# Patient Record
Sex: FEMALE | Race: WHITE | NOT HISPANIC OR LATINO | Employment: FULL TIME | URBAN - METROPOLITAN AREA
[De-identification: names, ages, dates, MRNs, and addresses within clinical notes are randomized per-mention and may not be internally consistent; named-entity substitution may affect disease eponyms.]

---

## 2017-01-05 ENCOUNTER — ALLSCRIPTS OFFICE VISIT (OUTPATIENT)
Dept: OTHER | Facility: OTHER | Age: 52
End: 2017-01-05

## 2017-05-02 DIAGNOSIS — Z12.31 ENCOUNTER FOR SCREENING MAMMOGRAM FOR MALIGNANT NEOPLASM OF BREAST: ICD-10-CM

## 2017-05-15 ENCOUNTER — HOSPITAL ENCOUNTER (OUTPATIENT)
Dept: RADIOLOGY | Facility: CLINIC | Age: 52
Discharge: HOME | End: 2017-05-15
Payer: COMMERCIAL

## 2017-05-15 DIAGNOSIS — Z12.31 ENCOUNTER FOR SCREENING MAMMOGRAM FOR MALIGNANT NEOPLASM OF BREAST: ICD-10-CM

## 2017-05-15 PROCEDURE — G0202 SCR MAMMO BI INCL CAD: HCPCS

## 2017-05-25 ENCOUNTER — GENERIC CONVERSION - ENCOUNTER (OUTPATIENT)
Dept: OTHER | Facility: OTHER | Age: 52
End: 2017-05-25

## 2017-08-02 ENCOUNTER — ALLSCRIPTS OFFICE VISIT (OUTPATIENT)
Dept: OTHER | Facility: OTHER | Age: 52
End: 2017-08-02

## 2017-08-02 DIAGNOSIS — R53.83 OTHER FATIGUE: ICD-10-CM

## 2017-08-02 DIAGNOSIS — M25.50 PAIN IN JOINT: ICD-10-CM

## 2017-08-02 DIAGNOSIS — R53.81 OTHER MALAISE: ICD-10-CM

## 2017-08-02 DIAGNOSIS — Z12.11 ENCOUNTER FOR SCREENING FOR MALIGNANT NEOPLASM OF COLON: ICD-10-CM

## 2017-12-14 ENCOUNTER — ALLSCRIPTS OFFICE VISIT (OUTPATIENT)
Dept: OTHER | Facility: OTHER | Age: 52
End: 2017-12-14

## 2017-12-14 DIAGNOSIS — S46.119A STRAIN OF MUSCLE, FASCIA AND TENDON OF LONG HEAD OF BICEPS, UNSPECIFIED ARM, INITIAL ENCOUNTER: ICD-10-CM

## 2017-12-14 DIAGNOSIS — S46.211A STRAIN OF MUSCLE, FASCIA AND TENDON OF OTHER PARTS OF BICEPS, RIGHT ARM, INITIAL ENCOUNTER: ICD-10-CM

## 2017-12-17 NOTE — PROGRESS NOTES
Assessment  1  Strain of right upper arm (840 9) (S46 911A)   2  Tear of right biceps muscle, initial encounter (840 8) (S46 211A)    Plan  Biceps muscle tear, Tear of right biceps muscle, initial encounter    · * MRI HUMERUS RIGHT WO CONTRAST; Status:Need Information - FinancialAuthorization; Requested for:[id number]; Insomnia, unspecified type    · Zolpidem Tartrate 10 MG Oral Tablet; TAKE 1 TABLET AT  BEDTIME AS NEEDEDFOR INSOMNIA  Rash    · Nystatin 309620 UNIT/GM External Powder; apply to skin once a day prn  Toenail fungus    · Ciclopirox 8 % External Solution ([place]); Apply to affected nail bed(s) andadjacent skin daily  Remove with alcohol every 7 days    Discussion/Summary    Will get MRI of right bicep musclelimit heavy lifting until we get the imaging doneget the fasting labs donemeds refilledPatient given script to have fasting lab work drawn before next visit  10 hour fast, no food, juice, coffee, tea  Water is OK though  Follow up apt in 1 months  We will review the blood work at that office visit  Lifestyle changes and medicine cannot cure high blood pressure  However, the lifestyle advice that we've discussed today and medications can help reduce your BP  With good control, many problems related to hypertension, such as stroke, heart attack, blindness, can be avoided  We will need to see you often until your blood pressure is controlled  If we prescribe medicine, keep taking it even if your blood pressure becomes normal Your blood pressure will normalize because of the medicine  Do not stop taking it without talking with us firstYou will need to monitor your blood pressure by taking it at home once a day  Record these numbers in a log and bring this record with you to your office visits  Continue to eat well, exercise, and do not smoke  To decrease the sodium in your diet: Â· Use fresh vegetables and foods as much as possible  Â· Avoid canned and processed foods   Cured meats such as sousa, ham, and sausages are high in salt  Â· Try using different herbs and spices in your cooking instead of salt  Â· In restaurants, avoid foods with sauces, cheese, and cured meats  Ask for low-sodium choices  To get more potassium in your diet, eat: Â· Bananas, fresh or dried apricots, peaches, citrus fruits, melons Â· Cauliflower, broccoli, tomatoes, carrots, raw spinach, beet greens, potatoes To get more magnesium in your diet, eat: Â· Whole grain foods, leafy green vegetables, dried fruits Â· Fish and seafood, poultry To get more calcium in your diet, eat: Â· Nonfat milk, yogurt, and low-fat cheeses Â· Isom and sardines Â· All these changes help keep your blood pressure down  What to watch for  Call or seek medical care right away if you have any of the following symptoms:  A sudden severe headache  Trouble breathing  Chest pain  Weakness, tingling, or numbness in hands or feet  Vision changes or double vision  The patient was counseled regarding diagnostic results,-- instructions for management,-- risk factor reductions,-- prognosis,-- patient and family education,-- impressions  Possible side effects of new medications were reviewed with the patient/guardian today  The treatment plan was reviewed with the patient/guardian  The patient/guardian understands and agrees with the treatment plan      Chief Complaint  1  Shoulder Pain   2  Shoulder Problem  Patient is in the office today complaining of injuring her right shoulder back in October and it still is not better  Patient states that she hurt it while pumpkin picking  History of Present Illness  Shoulder Problem:   Donnie Chavez presents with complaints of sudden onset of severe right lateral and right anterior shoulder problem starting October 1, 2017  The symptoms resulted from a throwing motion  Her symptoms are reportedly caused by overhead reaching  Symptoms are made worse by shoulder motion, lifting and throwing  Symptoms are unchanged    Associated symptoms include decreased range of motion-- and-- pain in the arm  Review of Systems   Constitutional: feeling poorly  ENT: no ear ache, no loss of hearing, no nosebleeds or nasal discharge, no sore throat or hoarseness  Cardiovascular: no complaints of slow or fast heart rate, no chest pain, no palpitations, no leg claudication or lower extremity edema  Respiratory: no complaints of shortness of breath, no wheezing, no dyspnea on exertion, no orthopnea or PND  Gastrointestinal: no complaints of abdominal pain, no constipation, no nausea or diarrhea, no vomiting, no bloody stools  Genitourinary: no complaints of dysuria, no incontinence, no pelvic pain, no dysmenorrhea, no vaginal discharge or abnormal vaginal bleeding  Musculoskeletal: limb pain  Integumentary: rash-- and-- dry skin  ROS reviewed  Active Problems  1  Acute frontal sinusitis, recurrence not specified (461 1) (J01 10)   2  Arthralgia (719 40) (M25 50)   3  Back muscle spasm (724 8) (M62 830)   4  Borderline hyperlipidemia (272 4) (E78 5)   5  Depression (311) (F32 9)   6  Dysuria (788 1) (R30 0)   7  Encounter for screening mammogram for breast cancer (V76 12) (Z12 31)   8  Essential hypertension (401 9) (I10)   9  IFG (impaired fasting glucose) (790 21) (R73 01)   10  Insomnia, unspecified type (780 52) (G47 00)   11  Malaise and fatigue (780 79) (R53 81,R53 83)   12  Screening for malignant neoplasm of colon (V76 51) (Z12 11)   13  Weight gain (783 1) (R63 5)    Past Medical History  Active Problems And Past Medical History Reviewed: The active problems and past medical history were reviewed and updated today  Family History  Mother    1  Family history of mental disorder (V17 0) (Z81 8)   2  Family history of skin cancer (V16 8) (Z80 8)  Father    3  Family history of essential hypertension (V17 49) (Z82 49)  Family History Reviewed: The family history was reviewed and updated today         Social History   · Always uses seat belt   · Daily caffeine consumption   · Never smoker  The social history was reviewed and updated today  The social history was reviewed and is unchanged  Surgical History  1  History of  Section   2  History of Knee Replacement  Surgical History Reviewed: The surgical history was reviewed and updated today  Current Meds   1  BuPROPion HCl ER (XL) 300 MG Oral Tablet Extended Release 24 Hour; TAKE 1 TABLET DAILY AS DIRECTED; Therapy: 24WZQ2022 to (Evaluate:66Iuz1818)  Requested for: 40QWK4840; Last Rx:2017 Ordered   2  HydroCHLOROthiazide 12 5 MG Oral Capsule; TAKE 1 CAPSULE DAILY; Therapy: 03ZJW0089 to (Evaluate:33Bgp4402)  Requested for: 64Xcz8917; Last Rx:06Gvf4028 Ordered   3  Multi For Her TABS; TAKE 1 TABLET DAILY; Therapy: (Recorded:2016) to Recorded   4  TraMADol HCl - 50 MG Oral Tablet; TAKE 1 TABLET 3 TIMES DAILY AS NEEDED; Therapy: 51Lsk8429 to (Evaluate:62Bau7812); Last Rx:21Moc3691 Ordered   5  Valsartan 320 MG Oral Tablet; TAKE 1 TABLET DAILY; Therapy: 62IQC3687 to (Evaluate:2018)  Requested for: 44OMY3493; Last Rx:49Hmg0222 Ordered   6  Zolpidem Tartrate 10 MG Oral Tablet; TAKE 1 TABLET AT  BEDTIME AS NEEDED FOR INSOMNIA; Therapy: 39IEB8648 to (Evaluate:62Atw7892); Last Rx:2017 Ordered    The medication list was reviewed and updated today  Allergies  1  Zithromax Z-Edwardo CAPS   2  Erythromycin TABS    Vitals   Recorded: 73VVY5214 04:54PM   Temperature 98 F   Heart Rate 101   Respiration 18   Systolic 203   Diastolic 72   Height 5 ft 4 in   Weight 284 lb    BMI Calculated 48 75   BSA Calculated 2 27   O2 Saturation 98     Physical Exam   Eyes  Pupils and irises: Equal, round and reactive to light  Ears, Nose, Mouth, and Throat  Otoscopic examination: Tympanic membranes translucent with normal light reflex  Canals patent without erythema  Oropharynx: Normal with no erythema, edema, exudate or lesions     Pulmonary  Respiratory effort: No increased work of breathing or signs of respiratory distress  Auscultation of lungs: Clear to auscultation  Cardiovascular  Auscultation of heart: Normal rate and rhythm, normal S1 and S2, without murmurs  Examination of extremities for edema and/or varicosities: Normal    Lymphatic  Palpation of lymph nodes in neck: No lymphadenopathy  Musculoskeletal  Gait and station: Normal    Inspection/palpation of joints, bones, and muscles: Abnormal  -- right upper extrem  prox bicep muscle with very focal area of pain  localized, area palpated tender and somewhat boggy, no inflammation , no streaking,  Skin  Skin and subcutaneous tissue: Normal without rashes or lesions  Psychiatric  Orientation to person, place, and time: Normal        Results/Data  PHQ-9 Adult Depression Screening 69HUL8191 04:59PM User, [facility]     Test Name Result Flag Reference   PHQ-9 Adult Depression Score 2     Over the last two weeks, how often have you been bothered by any of the following problems? Little interest or pleasure in doing things: Not at all - 0 Feeling down, depressed, or hopeless: Several days - 1 Trouble falling or staying asleep, or sleeping too much: Several days - 1 Feeling tired or having little energy: Not at all - 0 Poor appetite or over eating: Not at all - 0 Feeling bad about yourself - or that you are a failure or have let yourself or your family down: Not at all - 0 Trouble concentrating on things, such as reading the newspaper or watching television: Not at all - 0 Moving or speaking so slowly that other people could have noticed   Or the opposite -  being so fidgety or restless that you have been moving around a lot more than usual: Not at all - 0 Thoughts that you would be better off dead, or of hurting yourself in some way: Not at all - 0   PHQ-9 Adult Depression Screening Negative     PHQ-9 Difficulty Level Not difficult at all     PHQ-9 Severity Minimal Depression         Attending Note  Collaborating Physician Note: Collaborating Note: I supervised the Advanced Practitioner-- and-- I agree with the Advanced Practitioner note        Signatures   Electronically signed by : Estephania Rosas [facility] [id number] 12:44PM EST                       (Author)    Electronically signed by : Go Burroughs DO; Dec 16 2017  7:55AM EST                       (Co-author)

## 2018-01-12 VITALS
HEIGHT: 64 IN | TEMPERATURE: 97.2 F | DIASTOLIC BLOOD PRESSURE: 82 MMHG | WEIGHT: 290.38 LBS | RESPIRATION RATE: 16 BRPM | BODY MASS INDEX: 49.57 KG/M2 | HEART RATE: 79 BPM | OXYGEN SATURATION: 97 % | SYSTOLIC BLOOD PRESSURE: 140 MMHG

## 2018-01-14 VITALS
TEMPERATURE: 98.2 F | RESPIRATION RATE: 17 BRPM | OXYGEN SATURATION: 99 % | DIASTOLIC BLOOD PRESSURE: 80 MMHG | BODY MASS INDEX: 49.17 KG/M2 | WEIGHT: 288 LBS | HEIGHT: 64 IN | SYSTOLIC BLOOD PRESSURE: 130 MMHG | HEART RATE: 65 BPM

## 2018-01-16 NOTE — PROGRESS NOTES
Assessment    1  Encounter for preventive health examination (V70 0) (Z00 00)   2  Depression (311) (F32 9)   3  IFG (impaired fasting glucose) (790 21) (R73 01)   4  Insomnia, unspecified type (780 52) (G47 00)   5  Weight gain (783 1) (R63 5)    Plan  Borderline hyperlipidemia    · (1) T4, FREE; Status:Active; Requested for:17Aug2016;    · (1) TSH; Status:Active; Requested for:17Aug2016;    · (1) URINALYSIS w URINE C/S REFLEX (will reflex a microscopy if leukocytes, occult  blood, or nitrites are not within normal limits); Status:Active; Requested for:17Aug2016;    · *VB - Eye Exam; Status:Active; Requested for:17Aug2016;   Borderline hyperlipidemia, Weight gain    · (1) LIPID PANEL, FASTING; Status:Active; Requested for:17Aug2016;   Depression    · BuPROPion HCl ER (XL) 300 MG Oral Tablet Extended Release 24 Hour  (Wellbutrin XL); TAKE 1 TABLET DAILY AS DIRECTED  Essential hypertension    · Hydrochlorothiazide 12 5 MG Oral Capsule; TAKE 1 CAPSULE DAILY   · Valsartan 320 MG Oral Tablet; TAKE 1 TABLET DAILY  IFG (impaired fasting glucose)    · Invokana 300 MG Oral Tablet; One pill daily  Insomnia, unspecified type    · Zolpidem Tartrate 10 MG Oral Tablet; TAKE 1 TABLET AT  BEDTIME AS NEEDED  FOR INSOMNIA  Weight gain    · (1) BASIC METABOLIC PROFILE; Status:Active; Requested for:17Aug2016;    · (1) CBC/PLT/DIFF; Status:Active; Requested for:17Aug2016;    · (1) HEPATIC FUNCTION PANEL; Status:Active; Requested for:17Aug2016;    · Follow-up visit in 1 month Evaluation and Treatment  Follow-up  Status: Complete  Done:  07ASN0384    Discussion/Summary  health maintenance visit healthy adult female Currently, she eats a healthy diet and eats an adequate diet  the risks and benefits of cervical cancer screening were discussed cervical cancer screening is current      Chief Complaint  Patient is here to establish one self              History of Present Illness  HM, Adult Female:  The patient is being seen for a health maintenance and Initial visit Health maintenance needs physical for job evaluation  Social History: Household members include spouse, 1 daughter(s) and 1 son(s)  She is   Work status: working full time and occupation: nurse  The patient has never smoked cigarettes  She reports rare alcohol use  General Health: She complains of vision problems  Lifestyle:  She consumes a diverse and healthy diet  She has weight concerns  She exercises regularly  She denies drug use  Reproductive health: the patient is perimenopausal   she is sexually active  Screening:   HPI: This patient is new to the [facility]  Transferring from another medical practice  Old records have been requested and will be reviewed upon receipt     Past Medical, Surgical, Social, history reviewed and updated  Medications reviewed and prescriptions refilled as noted below  Off the invokanna for the pst 3 months  overdue for her dm labs  wt is up 12 lbs    mood is down a bit  taking the wellbutrin xl   working night shift as a nurse  Depression (Initial): The patient is being seen for an initial evaluation of an existing diagnosis of depression  The patient is currently experiencing symptoms  no depressed mood fatigue poor concentration weight gain insomnia no irritability no anxiety Symptoms:  no suicidal ideation and no suicide attempt  Associated symptoms:  no racing thoughts  She describes this as mild  Current treatment includes wellbutrin xl  Review of Systems    Constitutional: feeling poorly and feeling tired  Eyes: No complaints of eye pain, no red eyes, no eyesight problems, no discharge, no dry eyes, no itching of eyes  ENT: no complaints of earache, no loss of hearing, no nose bleeds, no nasal discharge, no sore throat, no hoarseness  Cardiovascular: No complaints of slow heart rate, no fast heart rate, no chest pain, no palpitations, no leg claudication, no lower extremity edema  Respiratory: No complaints of shortness of breath, no wheezing, no cough, no SOB on exertion, no orthopnea, no PND  Gastrointestinal: No complaints of abdominal pain, no constipation, no nausea or vomiting, no diarrhea, no bloody stools  Genitourinary: No complaints of dysuria, no incontinence, no pelvic pain, no dysmenorrhea, no vaginal discharge or bleeding  Musculoskeletal: No complaints of arthralgias, no myalgias, no joint swelling or stiffness, no limb pain or swelling  Integumentary: No complaints of skin rash or lesions, no itching, no skin wounds, no breast pain or lump  Neurological: No complaints of headache, no confusion, no convulsions, no numbness, no dizziness or fainting, no tingling, no limb weakness, no difficulty walking  Psychiatric: Not suicidal, no sleep disturbance, no anxiety or depression, no change in personality, no emotional problems  Endocrine: No complaints of proptosis, no hot flashes, no muscle weakness, no deepening of the voice, no feelings of weakness  Hematologic/Lymphatic: No complaints of swollen glands, no swollen glands in the neck, does not bleed easily, does not bruise easily  ROS reviewed  Active Problems    1  Borderline hyperlipidemia (272 4) (E78 5)   2  Depression (311) (F32 9)   3  Essential hypertension (401 9) (I10)   4  IFG (impaired fasting glucose) (790 21) (R73 01)   5  Insomnia, unspecified type (780 52) (G47 00)   6  Weight gain (783 1) (R63 5)    Surgical History    · History of  Section   · History of Knee Replacement    Family History  Mother    · Family history of mental disorder (V17 0) (Z81 8)   · Family history of skin cancer (V16 8) (Z80 8)  Father    · Family history of essential hypertension (V17 49) (Z82 49)    Social History    · Always uses seat belt   · Daily caffeine consumption   · Never smoker    Current Meds   1  Multi For Her TABS; TAKE 1 TABLET DAILY; Therapy: (Recorded:2016) to Recorded   2  Valsartan 320 MG Oral Tablet; TAKE 1 TABLET DAILY; Therapy: (Recorded:39Yiy2450) to Recorded   3  Wellbutrin  MG Oral Tablet Extended Release 24 Hour; TAKE 1 TABLET DAILY; Therapy: (Recorded:66Sle8807) to Recorded    Allergies    1  Zithromax Z-Edwardo CAPS   2  Erythromycin TABS    Vitals   Recorded: 36ZAE9129 49:64TD   Systolic 099   Diastolic 78   Heart Rate 76   O2 Saturation 99   Height 5 ft 4 in   Weight 290 lb 6 08 oz   BMI Calculated 49 84   BSA Calculated 2 3     Physical Exam    Constitutional   General appearance: No acute distress, well appearing and well nourished  Eyes   Pupils and irises: Equal, round and reactive to light  Ears, Nose, Mouth, and Throat   External inspection of ears and nose: Normal     Otoscopic examination: Tympanic membranes translucent with normal light reflex  Canals patent without erythema  Oropharynx: Normal with no erythema, edema, exudate or lesions  Pulmonary   Respiratory effort: No increased work of breathing or signs of respiratory distress  Auscultation of lungs: Clear to auscultation  Cardiovascular   Auscultation of heart: Normal rate and rhythm, normal S1 and S2, without murmurs  Examination of extremities for edema and/or varicosities: Normal     Abdomen   Abdomen: Non-tender, no masses  Lymphatic   Palpation of lymph nodes in neck: No lymphadenopathy  Musculoskeletal   Gait and station: Normal     Inspection/palpation of joints, bones, and muscles: Normal     Skin   Skin and subcutaneous tissue: Normal without rashes or lesions  Neurologic   Reflexes: 2+ and symmetric  Sensation: No sensory loss      Psychiatric   Orientation to person, place, and time: Normal     Mood and affect: Normal        Results/Data  PHQ-9 Adult Depression Screening 93Kul7028 09:09AM User, [facility]     Test Name Result Flag Reference   PHQ-9 Adult Depression Score 7     Over the last two weeks, how often have you been bothered by any of the following problems? Little interest or pleasure in doing things: Several days - 1  Feeling down, depressed, or hopeless: Several days - 1  Trouble falling or staying asleep, or sleeping too much: Several days - 1  Feeling tired or having little energy: Several days - 1  Poor appetite or over eating: Several days - 1  Feeling bad about yourself - or that you are a failure or have let yourself or your family down: Several days - 1  Trouble concentrating on things, such as reading the newspaper or watching television: Several days - 1  Moving or speaking so slowly that other people could have noticed  Or the opposite -  being so fidgety or restless that you have been moving around a lot more than usual: Not at all - 0  Thoughts that you would be better off dead, or of hurting yourself in some way: Not at all - 0   PHQ-9 Adult Depression Screening Negative     PHQ-9 Difficulty Level Somewhat difficult     PHQ-9 Severity Mild Depression         Attending Note  Collaborating Physician Note: Collaborating Physician: I interviewed and examined the patient and I supervised the Advanced Practitioner  Future Appointments    Date/Time Provider Specialty Site   09/23/2016 08:15 AM RICHARD Hoffmann, [facility]     Signatures   Electronically signed by : Tram Deluca, [address] [place] [address] Drive;  Aug 17 2016 11:30AM EST                       (Author)    Electronically signed by : Shoaib Lanier DO; Aug 17 2016 12:16PM EST                       (Co-author)

## 2018-01-17 NOTE — RESULT NOTES
Verified Results  * MAMMO SCREENING BILATERAL W CAD [id number] 10:09AM Macario Aldana Order Number: [id number]    - Patient Instructions: To schedule this appointment, please contact Central Scheduling at [phone]  Do not wear any perfume, powder, lotion or deodorant on breast or underarm area  Please bring your doctors order, referral (if needed) and insurance information with you on the day of the test  Failure to bring this information may result in this test being rescheduled  Arrive 15 minutes prior to your appointment time to register  On the day of your test, please bring any prior mammogram or breast studies with you that were not performed at a [facility]  Failure to bring prior exams may result in your test needing to be rescheduled  Test Name Result Flag Reference   MAMMO SCREENING BILATERAL W CAD (Report)     Patient History:   Family history of unknown cancer at age 48 or over in mother  Took hormonal contraceptives for 15 years beginning at age 26  Patient has never smoked  Patient's BMI is 47 6  Reason for exam: screening, asymptomatic  Mammo Screening Bilateral W CAD: May 15, 2017 - Check In #:    [de-identified]   Bilateral CC and MLO view(s) were taken  Technologist: VIKAS Storey   Prior study comparison: October 23, 2014, bilateral screening    mammogram, performed at [facility]  The breast tissue is almost entirely fat  No dominant soft tissue mass or suspicious calcifications are    noted  The skin and nipple contours are within normal limits  No mammographic evidence of malignancy  No significant changes when compared with prior studies  ACR BI-RADSï¾® Assessments: BiRad:1 - Negative     Recommendation:   Routine screening mammogram of both breasts in 1 year  A    reminder letter will be scheduled  Analyzed by CAD     8-10% of cancers will be missed on mammography   Management of a    palpable abnormality must be based on clinical grounds  Patients   will be notified of their results via letter from our facility  Accredited by [employer] of Radiology and FDA       Transcription Location: EDUARD Hahn 98: [id number]     Risk Value(s):   Tyrer-Cuzick 10 Year: 2 100%, Tyrer-Cuzick Lifetime: 8 400%,    Myriad Table: 1 5%, ARUNA 5 Year: 0 7%, NCI Lifetime: 5 9%   Signed by:   Anastasiya Stephens MD   5/25/17

## 2018-01-22 VITALS
HEART RATE: 101 BPM | WEIGHT: 284 LBS | TEMPERATURE: 98 F | DIASTOLIC BLOOD PRESSURE: 72 MMHG | OXYGEN SATURATION: 98 % | BODY MASS INDEX: 48.49 KG/M2 | HEIGHT: 64 IN | RESPIRATION RATE: 18 BRPM | SYSTOLIC BLOOD PRESSURE: 120 MMHG

## 2018-01-26 ENCOUNTER — TELEPHONE (OUTPATIENT)
Dept: FAMILY MEDICINE CLINIC | Facility: CLINIC | Age: 53
End: 2018-01-26

## 2018-01-26 DIAGNOSIS — J06.9 ACUTE URI: Primary | ICD-10-CM

## 2018-01-26 RX ORDER — AZITHROMYCIN 250 MG/1
TABLET, FILM COATED ORAL
Qty: 6 TABLET | Refills: 0 | Status: SHIPPED | OUTPATIENT
Start: 2018-01-26 | End: 2018-01-31

## 2018-02-09 LAB — HBA1C MFR BLD HPLC: 4.7 %

## 2018-02-22 ENCOUNTER — TELEPHONE (OUTPATIENT)
Dept: FAMILY MEDICINE CLINIC | Facility: CLINIC | Age: 53
End: 2018-02-22

## 2018-02-22 NOTE — TELEPHONE ENCOUNTER
Spoke with her insurance company since she cancelled her first MRI last month after doing a peer to peer her exp date has passed so now we need to do another prior auth for her and of course it needs another peer to peer for her MRI of the R humerous  Patient cancelled her apt and never told us   She would like to go to [facility] for an open MRI    [phone] case # [id number]

## 2018-02-23 ENCOUNTER — DOCUMENTATION (OUTPATIENT)
Dept: FAMILY MEDICINE CLINIC | Facility: CLINIC | Age: 53
End: 2018-02-23

## 2018-02-23 NOTE — TELEPHONE ENCOUNTER
let know that the peer to peer  was done and they are not going to approve it again at this point unless  she goes to physical therapy or goes to see an orthopedic   so we will make the orthopedic consult for her

## 2018-02-23 NOTE — TELEPHONE ENCOUNTER
Please try to find that referral in our system   and the physical therapy would be for right arm pain and limited range of motion

## 2018-03-01 DIAGNOSIS — S46.211A STRAIN OF MUSCLE, FASCIA AND TENDON OF OTHER PARTS OF BICEPS, RIGHT ARM, INITIAL ENCOUNTER: Primary | ICD-10-CM

## 2018-03-22 ENCOUNTER — TELEPHONE (OUTPATIENT)
Dept: FAMILY MEDICINE CLINIC | Facility: CLINIC | Age: 53
End: 2018-03-22

## 2018-03-30 NOTE — TELEPHONE ENCOUNTER
Call placed to [facility] left on cell phone  My cell number provided to her  Her labs were done in a hospital based lab in [place],   and were difficult for us to retreive in a timely manner  Also, Her shoulder MRI which I ordered and required a prior auth  as well as a peer to peer, and was finally approved, was then cancelled by Chantel Pascual  I will speak with her about this  I know her  I've worked with her in the past     Also, Her shoulder MRI which I ordered, which required a prior auth  as well as a peer to peer, and was finally approved, was then cancelled by Chantel Pascual  So we set up an ortho consult in [place] for her to address the shoulder

## 2018-04-06 ENCOUNTER — EVALUATION (OUTPATIENT)
Dept: PHYSICAL THERAPY | Facility: CLINIC | Age: 53
End: 2018-04-06
Payer: COMMERCIAL

## 2018-04-06 DIAGNOSIS — S46.211A STRAIN OF MUSCLE, FASCIA AND TENDON OF OTHER PARTS OF BICEPS, RIGHT ARM, INITIAL ENCOUNTER: Primary | ICD-10-CM

## 2018-04-06 PROCEDURE — 97162 PT EVAL MOD COMPLEX 30 MIN: CPT | Performed by: PHYSICAL THERAPIST

## 2018-04-06 PROCEDURE — G8985 CARRY GOAL STATUS: HCPCS | Performed by: PHYSICAL THERAPIST

## 2018-04-06 PROCEDURE — G8984 CARRY CURRENT STATUS: HCPCS | Performed by: PHYSICAL THERAPIST

## 2018-04-06 NOTE — PROGRESS NOTES
PT Evaluation     Today's date: 2018  Patient name: Randy Arzate  : 1965  MRN: [id number]  Referring provider: LETICIA Steve  Dx:   Encounter Diagnosis     ICD-10-CM    1  Strain of muscle, fascia and tendon of other parts of biceps, right arm, initial encounter [id number] Ambulatory referral to Physical Therapy       Start Time: 9513  Stop Time: 09  Total time in clinic (min): 40 minutes    Assessment  Impairments: abnormal muscle tone, abnormal or restricted ROM, activity intolerance, impaired physical strength, lacks appropriate home exercise program, pain with function and scapular dyskinesis    Assessment details: Randy Arzate is a 47 y o  female who presents to physical therapy with pain, decreased UE range of motion, decreased UE strength, impaired function, decreased activity tolerance and fair posture  Patient's clinical presentation is consistent with their referring diagnosis of Strain of muscle, fascia and tendon of other parts of biceps, right arm, initial encounter  (primary encounter diagnosis)  The pt presents with functional limitations of ADLs, performing household chores and reaching  Pt would benefit from physical therapy services to address these limitations and maximize function  Pt was instructed and educated on good sitting posture today and demonstrates understanding  Understanding of Dx/Px/POC: good   Prognosis: good    Goals  Short term goals:  (3 weeks)  1  Patient will have pain level 2/10 right shoulder and elbow at rest  2  Patient will report a 50% improvement in symptoms with ADL's  3  Patient will have improved right shoulder and elbow ROM by 10 to 20 degrees    Long term goals: (6 weeks)  1  Patient will report 85 % improvement improvement in symptoms with ADL's   2  Patient will demonstrate appropriate scapulohumeral rhythm with reaching overhead  3   Patient will be independent in a comprehensive home exercise program      Plan  Patient would benefit from: skilled PT and PT eval  Planned modality interventions: cryotherapy and thermotherapy: hydrocollator packs  Planned therapy interventions: home exercise program, therapeutic exercise, stretching, strengthening, patient education, neuromuscular re-education, massage and functional ROM exercises  Frequency: 2x week  Duration in visits: 12  Duration in weeks: 6  Treatment plan discussed with: patient        Subjective Evaluation    History of Present Illness  Date of onset: 10/25/2017  Mechanism of injury: She reports she was lifting a pumpkin in Right arm and then next day she felt pain  She followed up with her PCP  She was then referred to PT      Pain  Current pain ratin  At best pain ratin  At worst pain ratin  Quality: sharp and dull ache  Relieving factors: rest  Aggravating factors: lifting and overhead activity    Social Support    Employment status: working (Nurse - geriatric , some lifting)  Hand dominance: right    Patient Goals  Patient goals for therapy: decreased pain          Objective     Postural Observations  Seated posture: poor        Passive Range of Motion   Left Shoulder   Flexion: 180 degrees   Abduction: 180 degrees   External rotation 0°: 90 degrees   Internal rotation 0°: 90 degrees     Right Shoulder   Flexion: 103 degrees   Abduction: 91 degrees   External rotation 0°: 69 degrees   Internal rotation 0°: 75 degrees     Left Elbow   Flexion: 148 degrees   Extension: 0 degrees     Right Elbow   Flexion: 139 degrees   Extension: -5 degrees     Scapular Mobility     Right Shoulder   Scapular Dyskinesis: grade I  Scapular mobility: fair    Strength/Myotome Testing     Left Shoulder     Planes of Motion   Flexion: 5   Abduction: 5   External rotation at 0°: 5   Internal rotation at 0°: 5     Right Shoulder     Planes of Motion   Flexion: 4   Abduction: 4-   External rotation at 0°: 4-   Internal rotation at 0°: 4     Left Elbow   Flexion: 5  Extension: 5  Forearm supination: 5  Forearm pronation: 5    Right Elbow   Flexion: 4-  Extension: 4-  Forearm supination: 4  Forearm pronation: 4      Flowsheet Rows    Flowsheet Row Most Recent Value   PT/OT G-Codes   Current Score  49   Projected Score  68   FOTO information reviewed  Yes   Assessment Type  Evaluation   G code set  Carrying, Moving & Handling Objects   Carrying, Moving and Handling Objects Current Status ()  CK   Carrying, Moving and Handling Objects Goal Status ()  CJ          Precautions - none    Specialty Daily Treatment Diary     Manual  4/6/18       STM to Right biceps, UT        MFR to pec minor        PROM to shld and elbow                            Exercise Diary         NuStep        Pulleys        Tband rows        Bicep curls        Tricep press        Wrist sup/pro                                                                                                                            Modalities        CP                Visit # 1

## 2018-04-12 ENCOUNTER — OFFICE VISIT (OUTPATIENT)
Dept: PHYSICAL THERAPY | Facility: CLINIC | Age: 53
End: 2018-04-12
Payer: COMMERCIAL

## 2018-04-12 DIAGNOSIS — S46.211A STRAIN OF MUSCLE, FASCIA AND TENDON OF OTHER PARTS OF BICEPS, RIGHT ARM, INITIAL ENCOUNTER: Primary | ICD-10-CM

## 2018-04-12 PROCEDURE — 97110 THERAPEUTIC EXERCISES: CPT

## 2018-04-12 PROCEDURE — 97140 MANUAL THERAPY 1/> REGIONS: CPT

## 2018-04-12 NOTE — PROGRESS NOTES
Daily Note     Today's date: 2018  Patient name: Kelvin Meigs  : 1965  MRN: [id number]  Referring provider: LETICIA Owens  Dx:   Encounter Diagnosis     ICD-10-CM    1  Strain of muscle, fascia and tendon of other parts of biceps, right arm, initial encounter [id number]                   Subjective: reports 4/10 r bicep pain today    Objective: See treatment diary below      Specialty Daily Treatment Diary     Manual  18      STM to Right biceps, UT  8 min      MFR to pec minor  3 min      PROM to shld and elbow  4 min                          Exercise Diary   18      NuStep  10 min      Pulleys  20      Tband rows  20 X gr      Bicep curls  20      Tricep press  20 x gr      Wrist sup/pro  20                                                                                                                          Modalities  18      CP  10 min              Visit # 1 2              Assessment: Tolerated treatment  TTP along mid R bicep  ROM WNL well  Patient would benefit from continued PT      Plan: Continue per plan of care

## 2018-04-17 ENCOUNTER — APPOINTMENT (OUTPATIENT)
Dept: PHYSICAL THERAPY | Facility: CLINIC | Age: 53
End: 2018-04-17
Payer: COMMERCIAL

## 2018-04-20 ENCOUNTER — APPOINTMENT (OUTPATIENT)
Dept: PHYSICAL THERAPY | Facility: CLINIC | Age: 53
End: 2018-04-20
Payer: COMMERCIAL

## 2018-04-26 ENCOUNTER — APPOINTMENT (OUTPATIENT)
Dept: PHYSICAL THERAPY | Facility: CLINIC | Age: 53
End: 2018-04-26
Payer: COMMERCIAL

## 2018-07-11 NOTE — PROGRESS NOTES
PT Discharge    This patient has been non-compliant with scheduled visits  D/C from PT at this time

## 2018-07-14 DIAGNOSIS — I10 HYPERTENSION, ESSENTIAL: Primary | ICD-10-CM

## 2018-07-15 RX ORDER — VALSARTAN 320 MG/1
TABLET ORAL
Qty: 90 TABLET | Refills: 1 | Status: SHIPPED | OUTPATIENT
Start: 2018-07-15 | End: 2019-01-22

## 2018-10-14 DIAGNOSIS — G47.09 OTHER INSOMNIA: Primary | ICD-10-CM

## 2018-10-16 RX ORDER — ZOLPIDEM TARTRATE 10 MG/1
TABLET ORAL
Qty: 30 TABLET | Refills: 3 | Status: SHIPPED | OUTPATIENT
Start: 2018-10-16 | End: 2019-06-06 | Stop reason: SDUPTHER

## 2018-11-29 DIAGNOSIS — F33.2 SEVERE EPISODE OF RECURRENT MAJOR DEPRESSIVE DISORDER, WITHOUT PSYCHOTIC FEATURES (HCC): Primary | ICD-10-CM

## 2018-12-02 DIAGNOSIS — R73.01 IFG (IMPAIRED FASTING GLUCOSE): ICD-10-CM

## 2018-12-02 DIAGNOSIS — R53.82 CHRONIC FATIGUE: ICD-10-CM

## 2018-12-02 DIAGNOSIS — E78.2 MIXED HYPERLIPIDEMIA: ICD-10-CM

## 2018-12-02 DIAGNOSIS — Z12.11 SCREENING FOR COLON CANCER: ICD-10-CM

## 2018-12-02 DIAGNOSIS — I10 ESSENTIAL HYPERTENSION: Primary | ICD-10-CM

## 2018-12-02 RX ORDER — BUPROPION HYDROCHLORIDE 300 MG/1
TABLET ORAL
Qty: 90 TABLET | Refills: 1 | Status: SHIPPED | OUTPATIENT
Start: 2018-12-02 | End: 2019-06-03 | Stop reason: SDUPTHER

## 2018-12-02 NOTE — TELEPHONE ENCOUNTER
Gil Ray needs an appt with lab work here  And a check up  Let her know I've refilled her wellbutrin  The lab script is in    Other tests like her   mammo  dexa  Colonoscopy  Flu shot  When did she have these done last???

## 2019-01-20 DIAGNOSIS — I10 HYPERTENSION, ESSENTIAL: ICD-10-CM

## 2019-01-21 ENCOUNTER — DOCUMENTATION (OUTPATIENT)
Dept: FAMILY MEDICINE CLINIC | Facility: CLINIC | Age: 54
End: 2019-01-21

## 2019-01-22 ENCOUNTER — TELEPHONE (OUTPATIENT)
Dept: FAMILY MEDICINE CLINIC | Facility: CLINIC | Age: 54
End: 2019-01-22

## 2019-01-22 DIAGNOSIS — I10 ESSENTIAL HYPERTENSION: Primary | ICD-10-CM

## 2019-01-22 RX ORDER — LOSARTAN POTASSIUM 100 MG/1
100 TABLET ORAL DAILY
Qty: 90 TABLET | Refills: 1 | Status: SHIPPED | OUTPATIENT
Start: 2019-01-22 | End: 2019-07-26 | Stop reason: SDUPTHER

## 2019-01-22 RX ORDER — VALSARTAN 320 MG/1
TABLET ORAL
Qty: 90 TABLET | Refills: 1 | OUTPATIENT
Start: 2019-01-22

## 2019-01-22 NOTE — TELEPHONE ENCOUNTER
Left message on cell phone to return call  Valsartan refill request changed to Losartan 100 mg daily  Called to her pharmacy  This due to recall of valsartan

## 2019-01-22 NOTE — TELEPHONE ENCOUNTER
Please call Madhavi Piña and let her know we had to change her bp med Valsartan to Losartan due to the recall  We had received a refill request for valsartan  I did call her cell phone but left a v/m message

## 2019-02-26 ENCOUNTER — OFFICE VISIT (OUTPATIENT)
Dept: FAMILY MEDICINE CLINIC | Facility: CLINIC | Age: 54
End: 2019-02-26
Payer: COMMERCIAL

## 2019-02-26 VITALS
HEART RATE: 94 BPM | BODY MASS INDEX: 48.32 KG/M2 | DIASTOLIC BLOOD PRESSURE: 84 MMHG | TEMPERATURE: 97.5 F | OXYGEN SATURATION: 98 % | HEIGHT: 64 IN | SYSTOLIC BLOOD PRESSURE: 138 MMHG | WEIGHT: 283 LBS

## 2019-02-26 DIAGNOSIS — M43.16 SPONDYLOLISTHESIS AT L4-L5 LEVEL: ICD-10-CM

## 2019-02-26 DIAGNOSIS — Z01.818 PRE-OP EVALUATION: Primary | ICD-10-CM

## 2019-02-26 DIAGNOSIS — I10 ESSENTIAL HYPERTENSION: ICD-10-CM

## 2019-02-26 DIAGNOSIS — F32.A DEPRESSION, UNSPECIFIED DEPRESSION TYPE: ICD-10-CM

## 2019-02-26 PROCEDURE — 93000 ELECTROCARDIOGRAM COMPLETE: CPT | Performed by: FAMILY MEDICINE

## 2019-02-26 PROCEDURE — 3079F DIAST BP 80-89 MM HG: CPT | Performed by: FAMILY MEDICINE

## 2019-02-26 PROCEDURE — 99214 OFFICE O/P EST MOD 30 MIN: CPT | Performed by: FAMILY MEDICINE

## 2019-02-26 PROCEDURE — 1036F TOBACCO NON-USER: CPT | Performed by: FAMILY MEDICINE

## 2019-02-26 PROCEDURE — 3075F SYST BP GE 130 - 139MM HG: CPT | Performed by: FAMILY MEDICINE

## 2019-02-26 PROCEDURE — 3008F BODY MASS INDEX DOCD: CPT | Performed by: FAMILY MEDICINE

## 2019-02-26 NOTE — PROGRESS NOTES
INTERNAL MEDICINE PRE-OPERATIVE EVALUATION  [facility] [place] [address]     NAME: Luis Angel Abarca  AGE: 47 y o  SEX: female  : 1965     DATE: 2019     Internal Medicine Pre-Operative Evaluation:     Chief Complaint: Pre-operative Evaluation     Surgery:  Scheduled for decompressive laminotomy with an instrumented posterior lateral fusion L4-L5    Anticipated Date of Surgery: 2019  Referring Provider: MD Zainab Badillo        History of Present Illness:     Luis Angel Abarca is a 47 y o  female who presents to the office today for a preoperative consultation at the request of surgeon, MD Zainab Badillo , who plans on performing 2019  Planned anesthesia is general  Patient has a bleeding risk of: no recent abnormal bleeding, no remote history of abnormal bleeding and no use of Ca-channel blockers  Patient does not have objections to receiving blood products if needed  Current anti-platelet/anti-coagulation medications that the patient is prescribed includes: n/a  Assessment of Chronic Conditions:   - Hypertension: stable     Assessment of Cardiac Risk:  · Denies unstable or severe angina or MI in the last 6 weeks or history of stent placement in the last year   · Denies decompensated heart failure (e g  New onset heart failure, NYHA functional class IV heart failure, or worsening existing heart failure)  · Denies significant arrhythmias such as high grade AV block, symptomatic ventricular arrhythmia, newly recognized ventricular tachycardia, supraventricular tachycardia with resting heart rate >100, or symptomatic bradycardia  · Denies severe heart valve disease including aortic stenosis or symptomatic mitral stenosis     Exercise Capacity:  · Able to walk 4 blocks without symptoms?: Yes  · Able to walk 2 flights without symptoms?: Yes    Prior Anesthesia Reactions: no     Personal history of venous thromboembolic disease?  No    History of steroid use for >2 weeks within last year? Yes        Review of Systems:     Review of Systems   Constitutional: Negative for appetite change, chills, fever and unexpected weight change  HENT: Negative for congestion, dental problem, ear pain, hearing loss, postnasal drip, rhinorrhea, sinus pressure, sinus pain, sneezing, sore throat, tinnitus and voice change  Eyes: Negative for visual disturbance  Respiratory: Negative for apnea, cough, chest tightness and shortness of breath  Cardiovascular: Negative for chest pain, palpitations and leg swelling  Gastrointestinal: Negative for abdominal pain, blood in stool, constipation, diarrhea, nausea and vomiting  Endocrine: Negative for cold intolerance, heat intolerance, polydipsia, polyphagia and polyuria  Genitourinary: Negative for decreased urine volume, difficulty urinating, dysuria, frequency and hematuria  Musculoskeletal: Positive for back pain  Negative for arthralgias, gait problem, joint swelling and myalgias  Skin: Negative for color change, rash and wound  Allergic/Immunologic: Negative for environmental allergies and food allergies  Neurological: Negative for dizziness, syncope, light-headedness, numbness and headaches  Weakness: right lower ext ,    Hematological: Negative for adenopathy  Does not bruise/bleed easily  Psychiatric/Behavioral: Negative for sleep disturbance and suicidal ideas  The patient is not nervous/anxious  Problem List:     Patient Active Problem List   Diagnosis    Pre-op evaluation    Spondylolisthesis at L4-L5 level    Essential hypertension    Depression        Allergies:      Allergies   Allergen Reactions    Azithromycin Nausea Only     Category: sensitivity;     Erythromycin         Current Medications:       Current Outpatient Medications:     buPROPion (WELLBUTRIN XL) 300 mg 24 hr tablet, TAKE 1 TABLET DAILY AS DIRECTED , Disp: 90 tablet, Rfl: 1    losartan (COZAAR) 100 MG tablet, Take 1 tablet (100 mg total) by mouth daily, Disp: 90 tablet, Rfl: 1    zolpidem (AMBIEN) 10 mg tablet, take 1 tablet at bedtime if needed for insomnia, Disp: 30 tablet, Rfl: 3     Past History:         Active Ambulatory Problems     Diagnosis Date Noted    Pre-op evaluation 02/26/2019    Spondylolisthesis at L4-L5 level 02/26/2019    Essential hypertension 02/26/2019    Depression 02/26/2019     Resolved Ambulatory Problems     Diagnosis Date Noted    No Resolved Ambulatory Problems     No Additional Past Medical History     Social History     Socioeconomic History    Marital status: /Civil Union     Spouse name: Not on file    Number of children: Not on file    Years of education: Not on file    Highest education level: Not on file   Occupational History    Not on file   Social Needs    Financial resource strain: Not on file    Food insecurity:     Worry: Not on file     Inability: Not on file    Transportation needs:     Medical: Not on file     Non-medical: Not on file   Tobacco Use    Smoking status: Never Smoker    Smokeless tobacco: Never Used   Substance and Sexual Activity    Alcohol use: Not on file    Drug use: Not on file    Sexual activity: Not on file   Lifestyle    Physical activity:     Days per week: Not on file     Minutes per session: Not on file    Stress: Not on file   Relationships    Social connections:     Talks on phone: Not on file     Gets together: Not on file     Attends [employer] service: Not on file     Active member of club or organization: Not on file     Attends meetings of clubs or organizations: Not on file     Relationship status: Not on file    Intimate partner violence:     Fear of current or ex partner: Not on file     Emotionally abused: Not on file     Physically abused: Not on file     Forced sexual activity: Not on file   Other Topics Concern    Not on file   Social History Narrative    Not on file        Physical Exam:      /84   Pulse 94   Temp 97 5 °F (36 4 °C)   Ht 5' 4" (1 626 m)   Wt 128 kg (283 lb)   SpO2 98%   BMI 48 58 kg/m²     Physical Exam      Data:     Pre-operative work-up    Laboratory Results: I have personally reviewed the pertinent laboratory results/reports     EKG: I have personally reviewed pertinent reports  Assessment:     1  Pre-op evaluation  POCT ECG   2  Spondylolisthesis at L4-L5 level     3  Essential hypertension     4  Depression, unspecified depression type          Plan:     47 y o  female with planned surgery:  Decompressive laminotomy with instrumented posterior  lateral fusion L4 - L5, by MD Lao    Cardiac Risk Estimation: per the Revised Cardiac Risk Index (Circ  100:1043, 1999), the patient's risk factors for cardiac complications include htn, putting her in: RCI RISK CLASS II (1 risk factor, risk of major cardiac compl  appr  1 3%)  1  Further preoperative workup as follows:   - None; no further preoperative work-up is required    2  Medication Management/Recommendations:   - None, continue medication regimen including morning of surgery, with sip of water    3  Prophylaxis for cardiac events with perioperative beta-blockers: not indicated  4  Patient requires further consultation with: None    Clearance  Patient is CLEARED for surgery without any additional cardiac testing       Manpreet Jean, [address] [place] [address] [facility]  Phone#  [phone]  Fax#  [phone]

## 2019-03-05 DIAGNOSIS — I10 ESSENTIAL HYPERTENSION: Primary | ICD-10-CM

## 2019-03-05 RX ORDER — HYDROCHLOROTHIAZIDE 12.5 MG/1
CAPSULE, GELATIN COATED ORAL
Qty: 90 CAPSULE | Refills: 1 | Status: SHIPPED | OUTPATIENT
Start: 2019-03-05 | End: 2020-01-17

## 2019-03-11 ENCOUNTER — TELEPHONE (OUTPATIENT)
Dept: FAMILY MEDICINE CLINIC | Facility: CLINIC | Age: 54
End: 2019-03-11

## 2019-03-11 NOTE — TELEPHONE ENCOUNTER
----- Message from Solo Almaraz Casia  sent at 3/1/2019  2:33 PM EST -----      ----- Message -----  From: Claudio Mauricio  Sent: 2/27/2019   3:37 PM  To: Lynda Rodriguez        ----- Message -----  From: Andrés Boswell  Sent: 2/27/2019   3:35 PM  To:  Abdullhai N Duc Bustillo

## 2019-03-11 NOTE — TELEPHONE ENCOUNTER
Called pt left voicemail        ----- Message from LETICIA Danielle sent at 3/1/2019  2:33 PM EST -----      ----- Message -----  From: Andrew Holder  Sent: 2/27/2019   3:37 PM  To: Huma Farias        ----- Message -----  From: Angela Rincon  Sent: 2/27/2019   3:35 PM  To:  Abdullahi N Duc Bustillo

## 2019-05-30 DIAGNOSIS — G47.09 OTHER INSOMNIA: ICD-10-CM

## 2019-05-30 RX ORDER — ZOLPIDEM TARTRATE 10 MG/1
10 TABLET ORAL AS NEEDED
Qty: 30 TABLET | Refills: 3 | Status: CANCELLED | OUTPATIENT
Start: 2019-05-30

## 2019-06-03 DIAGNOSIS — F33.2 SEVERE EPISODE OF RECURRENT MAJOR DEPRESSIVE DISORDER, WITHOUT PSYCHOTIC FEATURES (HCC): ICD-10-CM

## 2019-06-05 RX ORDER — BUPROPION HYDROCHLORIDE 300 MG/1
TABLET ORAL
Qty: 90 TABLET | Refills: 1 | Status: SHIPPED | OUTPATIENT
Start: 2019-06-05 | End: 2019-07-26 | Stop reason: SDUPTHER

## 2019-06-06 DIAGNOSIS — G47.09 OTHER INSOMNIA: ICD-10-CM

## 2019-06-06 RX ORDER — ZOLPIDEM TARTRATE 10 MG/1
TABLET ORAL
Qty: 30 TABLET | Refills: 3 | Status: SHIPPED | OUTPATIENT
Start: 2019-06-06 | End: 2020-03-26 | Stop reason: ALTCHOICE

## 2019-07-25 DIAGNOSIS — I10 ESSENTIAL HYPERTENSION: ICD-10-CM

## 2019-07-25 DIAGNOSIS — F33.2 SEVERE EPISODE OF RECURRENT MAJOR DEPRESSIVE DISORDER, WITHOUT PSYCHOTIC FEATURES (HCC): ICD-10-CM

## 2019-07-25 RX ORDER — LOSARTAN POTASSIUM 100 MG/1
100 TABLET ORAL DAILY
Qty: 90 TABLET | Refills: 1 | Status: CANCELLED | OUTPATIENT
Start: 2019-07-25

## 2019-07-25 RX ORDER — BUPROPION HYDROCHLORIDE 300 MG/1
300 TABLET ORAL DAILY
Qty: 90 TABLET | Refills: 1 | Status: CANCELLED | OUTPATIENT
Start: 2019-07-25

## 2019-07-26 DIAGNOSIS — I10 ESSENTIAL HYPERTENSION: ICD-10-CM

## 2019-07-26 DIAGNOSIS — F33.2 SEVERE EPISODE OF RECURRENT MAJOR DEPRESSIVE DISORDER, WITHOUT PSYCHOTIC FEATURES (HCC): ICD-10-CM

## 2019-07-26 RX ORDER — LOSARTAN POTASSIUM 100 MG/1
100 TABLET ORAL DAILY
Qty: 90 TABLET | Refills: 1 | Status: SHIPPED | OUTPATIENT
Start: 2019-07-26 | End: 2020-01-27 | Stop reason: SDUPTHER

## 2019-07-26 RX ORDER — BUPROPION HYDROCHLORIDE 300 MG/1
300 TABLET ORAL DAILY
Qty: 90 TABLET | Refills: 1 | Status: SHIPPED | OUTPATIENT
Start: 2019-07-26 | End: 2020-02-13

## 2019-08-16 ENCOUNTER — TELEPHONE (OUTPATIENT)
Dept: FAMILY MEDICINE CLINIC | Facility: CLINIC | Age: 54
End: 2019-08-16

## 2019-08-27 DIAGNOSIS — Z12.11 SCREENING FOR COLON CANCER: ICD-10-CM

## 2019-08-27 DIAGNOSIS — E55.9 HYPOVITAMINOSIS D: ICD-10-CM

## 2019-08-27 DIAGNOSIS — R53.82 CHRONIC FATIGUE: ICD-10-CM

## 2019-08-27 DIAGNOSIS — E78.2 MIXED HYPERLIPIDEMIA: ICD-10-CM

## 2019-08-27 DIAGNOSIS — R73.01 IFG (IMPAIRED FASTING GLUCOSE): ICD-10-CM

## 2019-08-27 DIAGNOSIS — I10 ESSENTIAL HYPERTENSION: Primary | ICD-10-CM

## 2019-08-27 NOTE — TELEPHONE ENCOUNTER
There is already a script for labs in her chart, for the last set  But I will write a new one  Let her know

## 2019-10-23 ENCOUNTER — TELEPHONE (OUTPATIENT)
Dept: BARIATRICS | Facility: CLINIC | Age: 54
End: 2019-10-23

## 2019-11-26 DIAGNOSIS — Z46.51 FITTING AND ADJUSTMENT OF GASTRIC LAP BAND: Primary | ICD-10-CM

## 2020-01-16 ENCOUNTER — TELEPHONE (OUTPATIENT)
Dept: FAMILY MEDICINE CLINIC | Facility: CLINIC | Age: 55
End: 2020-01-16

## 2020-01-16 ENCOUNTER — HOSPITAL ENCOUNTER (EMERGENCY)
Facility: HOSPITAL | Age: 55
Discharge: HOME | End: 2020-01-16
Attending: EMERGENCY MEDICINE
Payer: COMMERCIAL

## 2020-01-16 VITALS
BODY MASS INDEX: 47.83 KG/M2 | RESPIRATION RATE: 18 BRPM | TEMPERATURE: 98 F | HEART RATE: 87 BPM | WEIGHT: 278.66 LBS | OXYGEN SATURATION: 99 % | SYSTOLIC BLOOD PRESSURE: 166 MMHG | DIASTOLIC BLOOD PRESSURE: 82 MMHG

## 2020-01-16 DIAGNOSIS — I10 HYPERTENSION: Primary | ICD-10-CM

## 2020-01-16 PROCEDURE — 99283 EMERGENCY DEPT VISIT LOW MDM: CPT

## 2020-01-16 PROCEDURE — 99284 EMERGENCY DEPT VISIT MOD MDM: CPT | Performed by: PHYSICIAN ASSISTANT

## 2020-01-16 NOTE — TELEPHONE ENCOUNTER
Patient called she is  Braydon llanes patient she was on valsartan 320 mg ross and then switched when valsartan was on back order to losartan 100 mg and has been on that for a while now  She went for a job physical today and her Bp was 210/120  I asked patient does she have an sx's chest pain,blurred vision and chest discomfort and she said no sx's she has had

## 2020-01-16 NOTE — ED NOTES
Pt educated on effects of htn on vital organs  Pt verbalized understanding and stated she has an appt with PCP 1/17       Aneudy Mcintosh, RN  01/16/20 8229

## 2020-01-16 NOTE — TELEPHONE ENCOUNTER
Placed follow up call and patient was seen in the ER and has an appointment with Dr China Rivera tomorrow

## 2020-01-16 NOTE — ED PROVIDER NOTES
History  Chief Complaint   Patient presents with    High Blood Pressure     Was seen at [facility] was told to follow up with PCP and PCP sent win[place] to the ED     48 y/o female, h/o HTN, presenting today for elevated blood pressure  Patient states that she was at a job interview and was having a pre employ physical where she was very nervous where they then took her blood pressure and saw that it was elevated at 200/120  Patient relays that she believes she made herself nerve medicine as is why her blood pressure increased  Patient did take her blood pressure medications this morning and is currently being seen by her family doctor for this  She is asymptomatic at this time, states that she has hungry otherwise feeling well without any complaints  Blood pressure was initially evaluated upon 1st arrival however quickly came down and is now 166/82 to on both arms  Denies nausea, vomiting, headache, changes in vision, chest pain, abdominal pain, numbness, paresthesias, weakness, confusion, shortness of breath, dizziness, lightheadedness  Prior to Admission Medications   Prescriptions Last Dose Informant Patient Reported? Taking? buPROPion (WELLBUTRIN XL) 300 mg 24 hr tablet 1/15/2020 at 2300  No Yes   Sig: Take 1 tablet (300 mg total) by mouth daily   hydrochlorothiazide (MICROZIDE) 12 5 mg capsule 1/16/2020 at 1100  No Yes   Sig: take 1 capsule by mouth once daily   Patient taking differently: 25 mg    losartan (COZAAR) 100 MG tablet 1/15/2020 at 2300  No Yes   Sig: Take 1 tablet (100 mg total) by mouth daily   zolpidem (AMBIEN) 10 mg tablet Past Month at Unknown time  No Yes   Sig: May use 1 pill at bedtime p r n   Insomnia      Facility-Administered Medications: None       Past Medical History:   Diagnosis Date    Hypertension     Psychiatric disorder     depression       Past Surgical History:   Procedure Laterality Date    BACK SURGERY      REPLACEMENT TOTAL KNEE Bilateral        History reviewed  No pertinent family history  I have reviewed and agree with the history as documented  Social History     Tobacco Use    Smoking status: Never Smoker    Smokeless tobacco: Never Used   Substance Use Topics    Alcohol use: Yes     Alcohol/week: 3 0 standard drinks     Types: 3 Glasses of wine per week    Drug use: Never        Review of Systems   Constitutional: Negative  HENT: Negative  Eyes: Negative  Respiratory: Negative  Cardiovascular: Negative  Gastrointestinal: Negative  Genitourinary: Negative  Musculoskeletal: Negative  Skin: Negative  Neurological: Negative  All other systems reviewed and are negative  Physical Exam  Physical Exam   Constitutional: She is oriented to person, place, and time  She appears well-developed and well-nourished  HENT:   Head: Normocephalic and atraumatic  Right Ear: External ear normal    Left Ear: External ear normal    Nose: Nose normal    Mouth/Throat: Oropharynx is clear and moist    Eyes: Pupils are equal, round, and reactive to light  Conjunctivae and EOM are normal    Neck: Normal range of motion  Neck supple  Cardiovascular: Normal rate, regular rhythm, normal heart sounds and intact distal pulses  Exam reveals no gallop and no friction rub  No murmur heard  Pulmonary/Chest: Effort normal and breath sounds normal  No stridor  No respiratory distress  She has no wheezes  She has no rales  She exhibits no tenderness  spo2 is 99% indicating adequate oxygenation    Abdominal: Soft  Bowel sounds are normal  She exhibits no distension and no mass  There is no tenderness  There is no rebound and no guarding  No hernia  Neurological: She is alert and oriented to person, place, and time  She has normal strength  She displays no atrophy and no tremor  No cranial nerve deficit or sensory deficit  She exhibits normal muscle tone  She displays a negative Romberg sign  She displays no seizure activity   Coordination and gait normal  GCS eye subscore is 4  GCS verbal subscore is 5  GCS motor subscore is 6  Strength and sensation is intact throughout all extremities  Good romberg  Ambulating without difficulty here in the ED and without abnormal gait  Skin: Skin is warm and dry  Capillary refill takes less than 2 seconds  No rash noted  No erythema  No pallor  Nursing note and vitals reviewed  Vital Signs  ED Triage Vitals [01/16/20 1452]   Temperature Pulse Respirations Blood Pressure SpO2   98 °F (36 7 °C) 87 18 (!) 210/128 99 %      Temp Source Heart Rate Source Patient Position - Orthostatic VS BP Location FiO2 (%)   Tympanic Monitor -- Right arm --      Pain Score       2           Vitals:    01/16/20 1452 01/16/20 1530 01/16/20 1544   BP: (!) 210/128 166/75 166/82   Pulse: 87           Visual Acuity      ED Medications  Medications - No data to display    Diagnostic Studies  Results Reviewed     None                 No orders to display              Procedures  Procedures         ED Course                               MDM  Number of Diagnoses or Management Options  Hypertension:   Diagnosis management comments: Patient blood pressure significantly lowered  She is asymptomatic and feeling well without any complaints at this time  I offered lab work however patient S feeling fine  She would like to schedule appointment with her family doctor tomorrow for evaluation  Patient is informed to return to the emergency department for worsening of symptoms and was given proper education regarding their diagnosis and symptoms  Informed to return for any headaches, changes in vision, confusion, chest pain etc   Otherwise the patient is informed to follow up with their primary care doctor for re-evaluation  The patient verbalizes understanding and agrees with above assessment and plan  All questions were answered       Please Note: Fluency Direct voice recognition software may have been used in the creation of this document  Wrong words or sound a like substitutions may have occurred due to the inherent limitations of the voice software  Amount and/or Complexity of Data Reviewed  Review and summarize past medical records: yes  Discuss the patient with other providers: yes  Independent visualization of images, tracings, or specimens: yes          Disposition  Final diagnoses:   Hypertension     Time reflects when diagnosis was documented in both MDM as applicable and the Disposition within this note     Time User Action Codes Description Comment    1/16/2020  4:15 PM Lukas Macario Add [I10] Hypertension       ED Disposition     ED Disposition Condition Date/Time Comment    Discharge Stable Thu Jan 16, 2020  4:15 PM Landry Morley discharge to home/self care  Follow-up Information     Follow up With Specialties Details Why Contact Info Additional [address] Emergency Department Emergency Medicine Go to  If symptoms worsen such as chest pain, difficulty breathing, severe headache, changes in vision, confusion, slurring of speech etc [address] [facility]  [phone] [facility], [place], [place], [address] [place] [address], [address] [place] [address], Nurse Practitioner Schedule an appointment as soon as possible for a visit in 1 day  [address]  [phone]             Discharge Medication List as of 1/16/2020  4:16 PM      CONTINUE these medications which have NOT CHANGED    Details   buPROPion (WELLBUTRIN XL) 300 mg 24 hr tablet Take 1 tablet (300 mg total) by mouth daily, Starting Fri 7/26/2019, Normal      hydrochlorothiazide (MICROZIDE) 12 5 mg capsule take 1 capsule by mouth once daily, Normal      losartan (COZAAR) 100 MG tablet Take 1 tablet (100 mg total) by mouth daily, Starting Fri 7/26/2019, Normal      zolpidem (AMBIEN) 10 mg tablet May use 1 pill at bedtime p r n   Insomnia, Normal No discharge procedures on file      ED Provider  Electronically Signed by           Anthony Huber PA-C  01/16/20 8182

## 2020-01-16 NOTE — TELEPHONE ENCOUNTER
Called patient and n/a I left a message that she is to go to the ED because her Bp is to high for her even if it is related to med change she has to be evaluated to make sure she is ol  Unfortunately I cant add to Nurse schedule because she needs to be seen clinical and have some test done  Straight to ED and f/u here after

## 2020-01-17 ENCOUNTER — OFFICE VISIT (OUTPATIENT)
Dept: FAMILY MEDICINE CLINIC | Facility: CLINIC | Age: 55
End: 2020-01-17
Payer: COMMERCIAL

## 2020-01-17 VITALS
HEART RATE: 92 BPM | WEIGHT: 278 LBS | TEMPERATURE: 97.9 F | DIASTOLIC BLOOD PRESSURE: 92 MMHG | RESPIRATION RATE: 18 BRPM | OXYGEN SATURATION: 100 % | BODY MASS INDEX: 47.46 KG/M2 | SYSTOLIC BLOOD PRESSURE: 162 MMHG | HEIGHT: 64 IN

## 2020-01-17 DIAGNOSIS — I10 ESSENTIAL HYPERTENSION: Primary | ICD-10-CM

## 2020-01-17 PROCEDURE — 99213 OFFICE O/P EST LOW 20 MIN: CPT | Performed by: FAMILY MEDICINE

## 2020-01-17 PROCEDURE — 1036F TOBACCO NON-USER: CPT | Performed by: FAMILY MEDICINE

## 2020-01-17 PROCEDURE — 3008F BODY MASS INDEX DOCD: CPT | Performed by: FAMILY MEDICINE

## 2020-01-17 PROCEDURE — 3080F DIAST BP >= 90 MM HG: CPT | Performed by: FAMILY MEDICINE

## 2020-01-17 PROCEDURE — 3077F SYST BP >= 140 MM HG: CPT | Performed by: FAMILY MEDICINE

## 2020-01-17 RX ORDER — HYDROCHLOROTHIAZIDE 25 MG/1
25 TABLET ORAL DAILY
Qty: 90 TABLET | Refills: 5 | Status: SHIPPED | OUTPATIENT
Start: 2020-01-17 | End: 2020-09-14 | Stop reason: SDUPTHER

## 2020-01-17 NOTE — PROGRESS NOTES
Assessment/Plan:  Return visit in 6 weeks  She is to do fasting blood work previously prescribed  Diagnoses and all orders for this visit:    Essential hypertension  -     hydrochlorothiazide (HYDRODIURIL) 25 mg tablet; Take 1 tablet (25 mg total) by mouth daily        Essential hypertension  Continue losartan 100 mg daily  Take HCTZ daily  BMI Counseling: Body mass index is 47 72 kg/m²  The BMI is above normal  Nutrition recommendations include decreasing portion sizes and moderation in carbohydrate intake  Exercise recommendations include exercising 3-5 times per week  No pharmacotherapy was ordered  Subjective:      Patient ID: Beth Ren is a 49 y o  female  Patient comes in after being found have blood pressure in the 200 range at a pre-employment physical   See said she was very anxious at the time  He then went to the emergency room where she was found have a blood pressure in the 160 range  She is taking losartan 100 mg daily on a regular basis but is no longer taking hydrochlorothiazide which she is using as needed for edema  The following portions of the patient's history were reviewed and updated as appropriate:   She has a past medical history of Hypertension and Psychiatric disorder  ,  does not have any pertinent problems on file  ,   has a past surgical history that includes Back surgery and Replacement total knee (Bilateral)  ,  family history is not on file  ,   reports that she has never smoked  She has never used smokeless tobacco  She reports that she drinks about 3 0 standard drinks of alcohol per week  She reports that she does not use drugs  ,  is allergic to azithromycin and erythromycin     Current Outpatient Medications   Medication Sig Dispense Refill    buPROPion (WELLBUTRIN XL) 300 mg 24 hr tablet Take 1 tablet (300 mg total) by mouth daily 90 tablet 1    losartan (COZAAR) 100 MG tablet Take 1 tablet (100 mg total) by mouth daily 90 tablet 1    zolpidem (AMBIEN) 10 mg tablet May use 1 pill at bedtime p r n  Insomnia 30 tablet 3    hydrochlorothiazide (HYDRODIURIL) 25 mg tablet Take 1 tablet (25 mg total) by mouth daily 90 tablet 5     No current facility-administered medications for this visit  Review of Systems   Constitutional: Negative  Respiratory: Negative  Cardiovascular: Negative  Neurological: Positive for headaches  Objective:  Vitals:    01/17/20 1001   BP: 162/92   Pulse: 92   Resp: 18   Temp: 97 9 °F (36 6 °C)   TempSrc: Tympanic   SpO2: 100%   Weight: 126 kg (278 lb)   Height: 5' 4" (1 626 m)     Body mass index is 47 72 kg/m²  Physical Exam   Constitutional: She is oriented to person, place, and time  She appears well-developed  Obese   HENT:   Head: Normocephalic and atraumatic  Right Ear: Tympanic membrane and external ear normal    Left Ear: Tympanic membrane and external ear normal    Mouth/Throat: Oropharynx is clear and moist    Eyes: Pupils are equal, round, and reactive to light  EOM are normal    Neck: Neck supple  No thyromegaly present  Cardiovascular: Normal rate, regular rhythm and normal heart sounds  Pulmonary/Chest: Effort normal and breath sounds normal    Abdominal: Soft  Musculoskeletal: Normal range of motion  Neurological: She is alert and oriented to person, place, and time  Skin: Skin is warm and dry  Psychiatric: She has a normal mood and affect

## 2020-01-27 DIAGNOSIS — I10 ESSENTIAL HYPERTENSION: ICD-10-CM

## 2020-01-27 RX ORDER — LOSARTAN POTASSIUM 100 MG/1
100 TABLET ORAL DAILY
Qty: 90 TABLET | Refills: 1 | Status: SHIPPED | OUTPATIENT
Start: 2020-01-27 | End: 2020-03-26

## 2020-02-13 DIAGNOSIS — F33.2 SEVERE EPISODE OF RECURRENT MAJOR DEPRESSIVE DISORDER, WITHOUT PSYCHOTIC FEATURES (HCC): ICD-10-CM

## 2020-02-13 RX ORDER — BUPROPION HYDROCHLORIDE 300 MG/1
TABLET ORAL
Qty: 90 TABLET | Refills: 1 | Status: SHIPPED | OUTPATIENT
Start: 2020-02-13 | End: 2020-09-14 | Stop reason: SDUPTHER

## 2020-03-25 ENCOUNTER — TELEPHONE (OUTPATIENT)
Dept: FAMILY MEDICINE CLINIC | Facility: CLINIC | Age: 55
End: 2020-03-25

## 2020-03-25 NOTE — TELEPHONE ENCOUNTER
Patient states she is currently at work and is having a B/P reading of 160/90  Patient wishes to know what she should do, patient is on b/p medications

## 2020-03-26 ENCOUNTER — TELEMEDICINE (OUTPATIENT)
Dept: FAMILY MEDICINE CLINIC | Facility: CLINIC | Age: 55
End: 2020-03-26
Payer: COMMERCIAL

## 2020-03-26 DIAGNOSIS — Z11.4 SCREENING FOR HIV WITHOUT PRESENCE OF RISK FACTORS: ICD-10-CM

## 2020-03-26 DIAGNOSIS — R73.01 IFG (IMPAIRED FASTING GLUCOSE): ICD-10-CM

## 2020-03-26 DIAGNOSIS — E78.2 MIXED HYPERLIPIDEMIA: ICD-10-CM

## 2020-03-26 DIAGNOSIS — Z12.11 SCREENING FOR COLON CANCER: ICD-10-CM

## 2020-03-26 DIAGNOSIS — I10 ESSENTIAL HYPERTENSION: Primary | ICD-10-CM

## 2020-03-26 DIAGNOSIS — Z12.31 SCREENING MAMMOGRAM, ENCOUNTER FOR: ICD-10-CM

## 2020-03-26 DIAGNOSIS — F41.9 ANXIETY: ICD-10-CM

## 2020-03-26 DIAGNOSIS — R53.82 CHRONIC FATIGUE: ICD-10-CM

## 2020-03-26 PROCEDURE — G2012 BRIEF CHECK IN BY MD/QHP: HCPCS | Performed by: FAMILY MEDICINE

## 2020-03-26 RX ORDER — AMLODIPINE BESYLATE AND BENAZEPRIL HYDROCHLORIDE 10; 40 MG/1; MG/1
CAPSULE ORAL
Qty: 90 CAPSULE | Refills: 1 | Status: SHIPPED | OUTPATIENT
Start: 2020-03-26 | End: 2020-08-24 | Stop reason: SDUPTHER

## 2020-03-26 RX ORDER — LORAZEPAM 0.5 MG/1
TABLET ORAL
Qty: 60 TABLET | Refills: 0 | Status: SHIPPED | OUTPATIENT
Start: 2020-03-26 | End: 2020-10-28 | Stop reason: ALTCHOICE

## 2020-03-26 NOTE — TELEPHONE ENCOUNTER
Patient was called today an a tele phone visit was conducted  Medication was reviewed and Lotrel was started

## 2020-03-26 NOTE — PROGRESS NOTES
Virtual Regular Visit    Problem List Items Addressed This Visit        Cardiovascular and Mediastinum    Essential hypertension - Primary    Relevant Orders    Microalbumin / creatinine urine ratio    TSH, 3rd generation with Free T4 reflex (Completed)    Comprehensive metabolic panel (Completed)      Other Visit Diagnoses     Anxiety        Relevant Medications    LORazepam (ATIVAN) 0 5 mg tablet    Mixed hyperlipidemia        Relevant Orders    Lipid panel (Completed)    Chronic fatigue        Relevant Orders    TSH, 3rd generation with Free T4 reflex (Completed)    Comprehensive metabolic panel (Completed)    CBC and differential (Completed)    Screening for colon cancer        Relevant Orders    Occult Blood, Fecal Immunochemical    IFG (impaired fasting glucose)        Relevant Orders    Hemoglobin A1C (Completed)    Screening for HIV without presence of risk factors        Relevant Orders    HIV 1/2 Antigen/Antibody (4th Generation) w Reflex [facility] (Completed)               Reason for visit is   Virtual telephone visit for hypertension  She is currently taking hydrochlorothiazide and losartan 100 mg  Blood pressure is not to goal  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 [id number] [place]  [address] [place] [id number] [phone]      Recent Visits  No visits were found meeting these conditions     Showing recent visits within past 7 days and meeting all other requirements     Future Appointments  No visits were found meeting these conditions  Showing future appointments within next 150 days and meeting all other requirements        After connecting through [employer], the patient was identified by name and date of birth  Gunner King was informed that this is a telemedicine visit and that the visit is being conducted through telephone which may not be secure and therefore, might not be HIPAA-compliant  My office door was closed  No one else was in the room  She acknowledged consent and understanding of privacy and security of the video platform  The patient has agreed to participate and understands they can discontinue the visit at any time  Subjective  Gunner King is a 48 y o  female   Virtual telephone visit for hypertension  She is currently taking hydrochlorothiazide and losartan 100 mg  Blood pressure is not to goal  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[place]  She is under an extreme amount of stress  She occasionally feels like she has panic attacks at work and she feels that this affects her blood pressure as well  She was recently in the emergency department for hypertensive episode  Medication was not changed at that time but we are going to discuss at this time  Does try to watch her salt  Does not exercise    Past Medical History:   Diagnosis Date    Hypertension     Psychiatric disorder     depression       Past Surgical History:   Procedure Laterality Date    BACK SURGERY      REPLACEMENT TOTAL KNEE Bilateral        Current Outpatient Medications   Medication Sig Dispense Refill    amLODIPine-benazepril (LOTREL) 10-40 MG per capsule Take 1 pill daily 90 capsule 1    buPROPion (WELLBUTRIN XL) 300 mg 24 hr tablet Take 1 tablet (300 mg total) by mouth daily 90 tablet 1    hydrochlorothiazide (HYDRODIURIL) 25 mg tablet Take 1 tablet (25 mg total) by mouth daily 90 tablet 1    LORazepam (ATIVAN) 0 5 mg tablet May use 1 pill every 8 hours p r n  anxiety 60 tablet 0     No current facility-administered medications for this visit  Allergies   Allergen Reactions    Azithromycin Nausea Only     Category: sensitivity;     Erythromycin        Review of Systems   Respiratory: Negative for cough and shortness of breath  Cardiovascular: Negative for palpitations and leg swelling  Psychiatric/Behavioral: Positive for sleep disturbance  The patient is nervous/anxious  Social History     Socioeconomic History    Marital status: /Civil Union     Spouse name: Not on file    Number of children: Not on file    Years of education: Not on file    Highest education level: Not on file   Occupational History    Not on file   Social Needs    Financial resource strain: Not on file    Food insecurity     Worry: Not on file     Inability: Not on file   [employer] needs     Medical: Not on file     Non-medical: Not on file   Tobacco Use    Smoking status: Never Smoker    Smokeless tobacco: Never Used   Substance and Sexual Activity    Alcohol use:  Yes     Alcohol/week: 3 0 standard drinks     Types: 3 Glasses of wine per week    Drug use: Never    Sexual activity: Not on file   Lifestyle    Physical activity     Days per week: Not on file     Minutes per session: Not on file    Stress: Not on file   Relationships    Social connections     Talks on phone: Not on file     Gets together: Not on file     Attends Jewish service: Not on file     Active member of club or organization: Not on file     Attends meetings of clubs or organizations: Not on file     Relationship status: Not on file    Intimate partner violence     Fear of current or ex partner: Not on file     Emotionally abused: Not on file     Physically abused: Not on file     Forced sexual activity: Not on file   Other Topics Concern    Not on file   Social History Narrative    Not on file     Past Medical History:   Diagnosis Date    Hypertension     Psychiatric disorder     depression       Current Outpatient Medications:     amLODIPine-benazepril (LOTREL) 10-40 MG per capsule, Take 1 pill daily, Disp: 90 capsule, Rfl: 1    buPROPion (WELLBUTRIN XL) 300 mg 24 hr tablet, Take 1 tablet (300 mg total) by mouth daily, Disp: 90 tablet, Rfl: 1    hydrochlorothiazide (HYDRODIURIL) 25 mg tablet, Take 1 tablet (25 mg total) by mouth daily, Disp: 90 tablet, Rfl: 1    LORazepam (ATIVAN) 0 5 mg tablet, May use 1 pill every 8 hours p r n  anxiety, Disp: 60 tablet, Rfl: 0  No family history on file  I spent 30 minutes with the patient during this visit  BMI Counseling: There is no height or weight on file to calculate BMI  The BMI is above normal  Nutrition recommendations include reducing portion sizes, decreasing overall calorie intake, 3-5 servings of fruits/vegetables daily, reducing fast food intake, consuming healthier snacks, decreasing soda and/or juice intake, moderation in carbohydrate intake, increasing intake of lean protein, reducing intake of saturated fat and trans fat and reducing intake of cholesterol  Exercise recommendations include moderate aerobic physical activity for 150 minutes/week

## 2020-05-14 ENCOUNTER — TELEPHONE (OUTPATIENT)
Dept: FAMILY MEDICINE CLINIC | Facility: CLINIC | Age: 55
End: 2020-05-14

## 2020-05-29 ENCOUNTER — TELEPHONE (OUTPATIENT)
Dept: FAMILY MEDICINE CLINIC | Facility: CLINIC | Age: 55
End: 2020-05-29

## 2020-06-01 ENCOUNTER — TELEPHONE (OUTPATIENT)
Dept: FAMILY MEDICINE CLINIC | Facility: CLINIC | Age: 55
End: 2020-06-01

## 2020-06-11 ENCOUNTER — TELEPHONE (OUTPATIENT)
Dept: FAMILY MEDICINE CLINIC | Facility: CLINIC | Age: 55
End: 2020-06-11

## 2020-06-18 ENCOUNTER — TELEPHONE (OUTPATIENT)
Dept: FAMILY MEDICINE CLINIC | Facility: CLINIC | Age: 55
End: 2020-06-18

## 2020-08-24 DIAGNOSIS — I10 ESSENTIAL HYPERTENSION: ICD-10-CM

## 2020-08-24 RX ORDER — AMLODIPINE BESYLATE AND BENAZEPRIL HYDROCHLORIDE 10; 40 MG/1; MG/1
CAPSULE ORAL
Qty: 90 CAPSULE | Refills: 1 | Status: SHIPPED | OUTPATIENT
Start: 2020-08-24 | End: 2020-09-14 | Stop reason: SDUPTHER

## 2020-09-11 DIAGNOSIS — F33.2 SEVERE EPISODE OF RECURRENT MAJOR DEPRESSIVE DISORDER, WITHOUT PSYCHOTIC FEATURES (HCC): ICD-10-CM

## 2020-09-11 RX ORDER — BUPROPION HYDROCHLORIDE 300 MG/1
300 TABLET ORAL DAILY
Qty: 90 TABLET | Refills: 1 | Status: CANCELLED | OUTPATIENT
Start: 2020-09-11

## 2020-09-11 NOTE — TELEPHONE ENCOUNTER
Patient called saying her pharmacy had contacted us multiple times to fill her medication  I do not see anything in her chart in regard to this  Could you please fill this for the patient? She only has two pills left

## 2020-09-14 DIAGNOSIS — F33.2 SEVERE EPISODE OF RECURRENT MAJOR DEPRESSIVE DISORDER, WITHOUT PSYCHOTIC FEATURES (HCC): ICD-10-CM

## 2020-09-14 DIAGNOSIS — I10 ESSENTIAL HYPERTENSION: ICD-10-CM

## 2020-09-14 RX ORDER — AMLODIPINE BESYLATE AND BENAZEPRIL HYDROCHLORIDE 10; 40 MG/1; MG/1
CAPSULE ORAL
Qty: 90 CAPSULE | Refills: 1 | Status: SHIPPED | OUTPATIENT
Start: 2020-09-14 | End: 2020-10-28

## 2020-09-14 RX ORDER — BUPROPION HYDROCHLORIDE 300 MG/1
300 TABLET ORAL DAILY
Qty: 90 TABLET | Refills: 1 | OUTPATIENT
Start: 2020-09-14

## 2020-09-14 RX ORDER — BUPROPION HYDROCHLORIDE 300 MG/1
300 TABLET ORAL DAILY
Qty: 90 TABLET | Refills: 1 | Status: SHIPPED | OUTPATIENT
Start: 2020-09-14 | End: 2020-11-12 | Stop reason: SDUPTHER

## 2020-09-14 RX ORDER — HYDROCHLOROTHIAZIDE 25 MG/1
25 TABLET ORAL DAILY
Qty: 90 TABLET | Refills: 1 | Status: SHIPPED | OUTPATIENT
Start: 2020-09-14 | End: 2020-10-28

## 2020-09-14 NOTE — TELEPHONE ENCOUNTER
Left voicemail that patients medication was filled  Called mobile, home phone number was out of service

## 2020-10-14 ENCOUNTER — APPOINTMENT (OUTPATIENT)
Dept: LAB | Facility: MEDICAL CENTER | Age: 55
End: 2020-10-14
Payer: COMMERCIAL

## 2020-10-14 DIAGNOSIS — R73.01 IFG (IMPAIRED FASTING GLUCOSE): ICD-10-CM

## 2020-10-14 DIAGNOSIS — E78.2 MIXED HYPERLIPIDEMIA: ICD-10-CM

## 2020-10-14 DIAGNOSIS — Z11.4 SCREENING FOR HIV WITHOUT PRESENCE OF RISK FACTORS: ICD-10-CM

## 2020-10-14 DIAGNOSIS — R53.82 CHRONIC FATIGUE: ICD-10-CM

## 2020-10-14 DIAGNOSIS — I10 ESSENTIAL HYPERTENSION: ICD-10-CM

## 2020-10-14 LAB
ALBUMIN SERPL BCP-MCNC: 4 G/DL (ref 3.5–5)
ALP SERPL-CCNC: 121 U/L (ref 46–116)
ALT SERPL W P-5'-P-CCNC: 23 U/L (ref 12–78)
ANION GAP SERPL CALCULATED.3IONS-SCNC: 6 MMOL/L (ref 4–13)
AST SERPL W P-5'-P-CCNC: 16 U/L (ref 5–45)
BASOPHILS # BLD AUTO: 0.04 THOUSANDS/ΜL (ref 0–0.1)
BASOPHILS NFR BLD AUTO: 1 % (ref 0–1)
BILIRUB SERPL-MCNC: 0.76 MG/DL (ref 0.2–1)
BUN SERPL-MCNC: 9 MG/DL (ref 5–25)
CALCIUM SERPL-MCNC: 10.1 MG/DL (ref 8.3–10.1)
CHLORIDE SERPL-SCNC: 104 MMOL/L (ref 100–108)
CHOLEST SERPL-MCNC: 193 MG/DL (ref 50–200)
CO2 SERPL-SCNC: 30 MMOL/L (ref 21–32)
CREAT SERPL-MCNC: 0.72 MG/DL (ref 0.6–1.3)
EOSINOPHIL # BLD AUTO: 0.24 THOUSAND/ΜL (ref 0–0.61)
EOSINOPHIL NFR BLD AUTO: 6 % (ref 0–6)
ERYTHROCYTE [DISTWIDTH] IN BLOOD BY AUTOMATED COUNT: 12.8 % (ref 11.6–15.1)
EST. AVERAGE GLUCOSE BLD GHB EST-MCNC: 97 MG/DL
GFR SERPL CREATININE-BSD FRML MDRD: 95 ML/MIN/1.73SQ M
GLUCOSE P FAST SERPL-MCNC: 86 MG/DL (ref 65–99)
HBA1C MFR BLD: 5 %
HCT VFR BLD AUTO: 43.9 % (ref 34.8–46.1)
HDLC SERPL-MCNC: 78 MG/DL
HGB BLD-MCNC: 14.3 G/DL (ref 11.5–15.4)
IMM GRANULOCYTES # BLD AUTO: 0.01 THOUSAND/UL (ref 0–0.2)
IMM GRANULOCYTES NFR BLD AUTO: 0 % (ref 0–2)
LDLC SERPL CALC-MCNC: 102 MG/DL (ref 0–100)
LYMPHOCYTES # BLD AUTO: 0.95 THOUSANDS/ΜL (ref 0.6–4.47)
LYMPHOCYTES NFR BLD AUTO: 24 % (ref 14–44)
MCH RBC QN AUTO: 31 PG (ref 26.8–34.3)
MCHC RBC AUTO-ENTMCNC: 32.6 G/DL (ref 31.4–37.4)
MCV RBC AUTO: 95 FL (ref 82–98)
MONOCYTES # BLD AUTO: 0.35 THOUSAND/ΜL (ref 0.17–1.22)
MONOCYTES NFR BLD AUTO: 9 % (ref 4–12)
NEUTROPHILS # BLD AUTO: 2.35 THOUSANDS/ΜL (ref 1.85–7.62)
NEUTS SEG NFR BLD AUTO: 60 % (ref 43–75)
NONHDLC SERPL-MCNC: 115 MG/DL
NRBC BLD AUTO-RTO: 0 /100 WBCS
PLATELET # BLD AUTO: 290 THOUSANDS/UL (ref 149–390)
PMV BLD AUTO: 11.7 FL (ref 8.9–12.7)
POTASSIUM SERPL-SCNC: 4.1 MMOL/L (ref 3.5–5.3)
PROT SERPL-MCNC: 7.7 G/DL (ref 6.4–8.2)
RBC # BLD AUTO: 4.62 MILLION/UL (ref 3.81–5.12)
SODIUM SERPL-SCNC: 140 MMOL/L (ref 136–145)
TRIGL SERPL-MCNC: 63 MG/DL
TSH SERPL DL<=0.05 MIU/L-ACNC: 1.82 UIU/ML (ref 0.36–3.74)
WBC # BLD AUTO: 3.94 THOUSAND/UL (ref 4.31–10.16)

## 2020-10-14 PROCEDURE — 80061 LIPID PANEL: CPT

## 2020-10-14 PROCEDURE — 84443 ASSAY THYROID STIM HORMONE: CPT

## 2020-10-14 PROCEDURE — 85025 COMPLETE CBC W/AUTO DIFF WBC: CPT

## 2020-10-14 PROCEDURE — 87389 HIV-1 AG W/HIV-1&-2 AB AG IA: CPT

## 2020-10-14 PROCEDURE — 83036 HEMOGLOBIN GLYCOSYLATED A1C: CPT

## 2020-10-14 PROCEDURE — 80053 COMPREHEN METABOLIC PANEL: CPT

## 2020-10-14 PROCEDURE — 36415 COLL VENOUS BLD VENIPUNCTURE: CPT

## 2020-10-15 DIAGNOSIS — F41.9 ANXIETY: ICD-10-CM

## 2020-10-15 LAB — HIV 1+2 AB+HIV1 P24 AG SERPL QL IA: NORMAL

## 2020-10-15 RX ORDER — LORAZEPAM 0.5 MG/1
TABLET ORAL
Qty: 60 TABLET | Refills: 0 | OUTPATIENT
Start: 2020-10-15

## 2020-10-28 ENCOUNTER — OFFICE VISIT (OUTPATIENT)
Dept: FAMILY MEDICINE CLINIC | Facility: CLINIC | Age: 55
End: 2020-10-28
Payer: COMMERCIAL

## 2020-10-28 VITALS
SYSTOLIC BLOOD PRESSURE: 132 MMHG | HEIGHT: 64 IN | TEMPERATURE: 97.8 F | BODY MASS INDEX: 45.07 KG/M2 | DIASTOLIC BLOOD PRESSURE: 80 MMHG | HEART RATE: 84 BPM | OXYGEN SATURATION: 99 % | WEIGHT: 264 LBS

## 2020-10-28 DIAGNOSIS — E55.9 HYPOVITAMINOSIS D: Primary | ICD-10-CM

## 2020-10-28 DIAGNOSIS — F51.01 PRIMARY INSOMNIA: ICD-10-CM

## 2020-10-28 DIAGNOSIS — Z23 NEED FOR INFLUENZA VACCINATION: ICD-10-CM

## 2020-10-28 DIAGNOSIS — I10 ESSENTIAL HYPERTENSION: ICD-10-CM

## 2020-10-28 PROCEDURE — 90682 RIV4 VACC RECOMBINANT DNA IM: CPT | Performed by: FAMILY MEDICINE

## 2020-10-28 PROCEDURE — 90471 IMMUNIZATION ADMIN: CPT | Performed by: FAMILY MEDICINE

## 2020-10-28 PROCEDURE — 3725F SCREEN DEPRESSION PERFORMED: CPT | Performed by: FAMILY MEDICINE

## 2020-10-28 PROCEDURE — 3008F BODY MASS INDEX DOCD: CPT | Performed by: FAMILY MEDICINE

## 2020-10-28 PROCEDURE — 3075F SYST BP GE 130 - 139MM HG: CPT | Performed by: FAMILY MEDICINE

## 2020-10-28 PROCEDURE — 99214 OFFICE O/P EST MOD 30 MIN: CPT | Performed by: FAMILY MEDICINE

## 2020-10-28 PROCEDURE — 3079F DIAST BP 80-89 MM HG: CPT | Performed by: FAMILY MEDICINE

## 2020-10-28 RX ORDER — AMLODIPINE BESYLATE AND BENAZEPRIL HYDROCHLORIDE 10; 40 MG/1; MG/1
CAPSULE ORAL
Qty: 90 CAPSULE | Refills: 1 | Status: SHIPPED | OUTPATIENT
Start: 2020-10-28 | End: 2021-03-10 | Stop reason: ALTCHOICE

## 2020-10-28 RX ORDER — MOXIFLOXACIN 5 MG/ML
SOLUTION/ DROPS OPHTHALMIC
COMMUNITY
Start: 2020-10-04 | End: 2021-03-10 | Stop reason: ALTCHOICE

## 2020-10-28 RX ORDER — HYDROCHLOROTHIAZIDE 25 MG/1
25 TABLET ORAL DAILY
Qty: 90 TABLET | Refills: 1 | Status: SHIPPED | OUTPATIENT
Start: 2020-10-28 | End: 2021-03-23 | Stop reason: SDUPTHER

## 2020-10-28 RX ORDER — ERGOCALCIFEROL 1.25 MG/1
50000 CAPSULE ORAL 3 TIMES WEEKLY
Qty: 12 CAPSULE | Refills: 0 | Status: SHIPPED | OUTPATIENT
Start: 2020-10-28 | End: 2021-01-07

## 2020-10-28 RX ORDER — ZOLPIDEM TARTRATE 5 MG/1
5 TABLET ORAL
Qty: 30 TABLET | Refills: 3 | Status: SHIPPED | OUTPATIENT
Start: 2020-10-28 | End: 2021-01-07

## 2020-10-28 RX ORDER — CYCLOPENTOLATE HYDROCHLORIDE 5 MG/ML
SOLUTION/ DROPS OPHTHALMIC
COMMUNITY
Start: 2020-10-07 | End: 2021-07-08 | Stop reason: HOSPADM

## 2020-11-12 DIAGNOSIS — E55.9 HYPOVITAMINOSIS D: ICD-10-CM

## 2020-11-12 DIAGNOSIS — F33.2 SEVERE EPISODE OF RECURRENT MAJOR DEPRESSIVE DISORDER, WITHOUT PSYCHOTIC FEATURES (HCC): ICD-10-CM

## 2020-11-12 RX ORDER — ERGOCALCIFEROL 1.25 MG/1
50000 CAPSULE ORAL 3 TIMES WEEKLY
Qty: 12 CAPSULE | Refills: 0 | OUTPATIENT
Start: 2020-11-13

## 2020-11-13 RX ORDER — BUPROPION HYDROCHLORIDE 300 MG/1
300 TABLET ORAL DAILY
Qty: 90 TABLET | Refills: 1 | Status: SHIPPED | OUTPATIENT
Start: 2020-11-13 | End: 2021-03-23 | Stop reason: SDUPTHER

## 2020-12-17 ENCOUNTER — OFFICE VISIT (OUTPATIENT)
Dept: URGENT CARE | Facility: CLINIC | Age: 55
End: 2020-12-17
Payer: COMMERCIAL

## 2020-12-17 ENCOUNTER — APPOINTMENT (OUTPATIENT)
Dept: RADIOLOGY | Facility: CLINIC | Age: 55
End: 2020-12-17
Payer: COMMERCIAL

## 2020-12-17 VITALS
DIASTOLIC BLOOD PRESSURE: 86 MMHG | OXYGEN SATURATION: 99 % | RESPIRATION RATE: 16 BRPM | WEIGHT: 270 LBS | SYSTOLIC BLOOD PRESSURE: 144 MMHG | HEART RATE: 92 BPM | TEMPERATURE: 98.1 F | BODY MASS INDEX: 46.35 KG/M2

## 2020-12-17 DIAGNOSIS — M79.674 PAIN OF TOE OF RIGHT FOOT: ICD-10-CM

## 2020-12-17 DIAGNOSIS — M79.674 PAIN OF TOE OF RIGHT FOOT: Primary | ICD-10-CM

## 2020-12-17 PROCEDURE — 99213 OFFICE O/P EST LOW 20 MIN: CPT | Performed by: PHYSICIAN ASSISTANT

## 2020-12-17 PROCEDURE — 73630 X-RAY EXAM OF FOOT: CPT

## 2021-01-06 ENCOUNTER — APPOINTMENT (OUTPATIENT)
Dept: RADIOLOGY | Facility: MEDICAL CENTER | Age: 56
End: 2021-01-06
Payer: COMMERCIAL

## 2021-01-06 ENCOUNTER — OFFICE VISIT (OUTPATIENT)
Dept: URGENT CARE | Facility: MEDICAL CENTER | Age: 56
End: 2021-01-06
Payer: COMMERCIAL

## 2021-01-06 VITALS
DIASTOLIC BLOOD PRESSURE: 90 MMHG | SYSTOLIC BLOOD PRESSURE: 154 MMHG | HEART RATE: 86 BPM | TEMPERATURE: 96.7 F | BODY MASS INDEX: 46.1 KG/M2 | HEIGHT: 64 IN | WEIGHT: 270 LBS | OXYGEN SATURATION: 99 % | RESPIRATION RATE: 18 BRPM

## 2021-01-06 DIAGNOSIS — S49.92XA ARM INJURIES, LEFT, INITIAL ENCOUNTER: ICD-10-CM

## 2021-01-06 DIAGNOSIS — S59.912A INJURY OF LEFT FOREARM, INITIAL ENCOUNTER: Primary | ICD-10-CM

## 2021-01-06 PROCEDURE — 73090 X-RAY EXAM OF FOREARM: CPT

## 2021-01-06 PROCEDURE — 99213 OFFICE O/P EST LOW 20 MIN: CPT | Performed by: PHYSICIAN ASSISTANT

## 2021-01-06 NOTE — LETTER
January 6, 2021     Patient: Beth Ren   YOB: 1965   Date of Visit: 1/6/2021       To Whom it May Concern:    Beth Ren was seen in my clinic on 1/6/2021  Please excuse from work 01/07/2021    If you have any questions or concerns, please don't hesitate to call           Sincerely,          Sharon Talley PA-C        CC: Beth Mikal

## 2021-01-07 ENCOUNTER — DOCUMENTATION (OUTPATIENT)
Dept: URGENT CARE | Facility: MEDICAL CENTER | Age: 56
End: 2021-01-07

## 2021-01-07 ENCOUNTER — OFFICE VISIT (OUTPATIENT)
Dept: OBGYN CLINIC | Facility: CLINIC | Age: 56
End: 2021-01-07
Payer: COMMERCIAL

## 2021-01-07 ENCOUNTER — TELEPHONE (OUTPATIENT)
Dept: URGENT CARE | Facility: MEDICAL CENTER | Age: 56
End: 2021-01-07

## 2021-01-07 VITALS
HEIGHT: 64 IN | SYSTOLIC BLOOD PRESSURE: 137 MMHG | HEART RATE: 79 BPM | WEIGHT: 270 LBS | DIASTOLIC BLOOD PRESSURE: 83 MMHG | BODY MASS INDEX: 46.1 KG/M2

## 2021-01-07 DIAGNOSIS — S52.135A CLOSED NONDISPLACED FRACTURE OF NECK OF LEFT RADIUS, INITIAL ENCOUNTER: Primary | ICD-10-CM

## 2021-01-07 PROCEDURE — 99203 OFFICE O/P NEW LOW 30 MIN: CPT | Performed by: ORTHOPAEDIC SURGERY

## 2021-01-07 PROCEDURE — 24650 CLTX RDL HEAD/NCK FX WO MNPJ: CPT | Performed by: ORTHOPAEDIC SURGERY

## 2021-01-07 NOTE — TELEPHONE ENCOUNTER
Patient is requesting to have another review of her Xray  She is in a lot of pain  Dolsie is not here  Kindly Thank you! Please advise

## 2021-01-07 NOTE — PATIENT INSTRUCTIONS
Contusion in Adults   WHAT YOU NEED TO KNOW:   A contusion is a bruise that appears on your skin after an injury  A bruise happens when small blood vessels tear but skin does not  Blood leaks into nearby tissue, such as soft tissue or muscle  DISCHARGE INSTRUCTIONS:   Return to the emergency department if:   · You have new trouble moving the injured area  · You have tingling or numbness in or near the injured area  · Your hand or foot below the bruise gets cold or turns pale  Call your doctor if:   · You find a new lump in the injured area  · Your symptoms do not improve with treatment after 4 to 5 days  · You have questions or concerns about your condition or care  Medicines: You may need any of the following:  · NSAIDs  help decrease swelling and pain or fever  This medicine is available with or without a doctor's order  NSAIDs can cause stomach bleeding or kidney problems in certain people  If you take blood thinner medicine, always ask your healthcare provider if NSAIDs are safe for you  Always read the medicine label and follow directions  · Prescription pain medicine  may be given  Ask your healthcare provider how to take this medicine safely  Some prescription pain medicines contain acetaminophen  Do not take other medicines that contain acetaminophen without talking to your healthcare provider  Too much acetaminophen may cause liver damage  Prescription pain medicine may cause constipation  Ask your healthcare provider how to prevent or treat constipation  · Take your medicine as directed  Contact your healthcare provider if you think your medicine is not helping or if you have side effects  Tell him of her if you are allergic to any medicine  Keep a list of the medicines, vitamins, and herbs you take  Include the amounts, and when and why you take them  Bring the list or the pill bottles to follow-up visits  Carry your medicine list with you in case of an emergency      Help mario alberto contusion heal:   · Rest the injured area  or use it less than usual  If you bruised your leg or foot, you may need crutches or a cane to help you walk  This will help you keep weight off your injured body part  · Apply ice  to decrease swelling and pain  Ice may also help prevent tissue damage  Use an ice pack, or put crushed ice in a plastic bag  Cover it with a towel and place it on your bruise for 15 to 20 minutes every hour or as directed  · Use compression  to support the area and decrease swelling  Wrap an elastic bandage around the area over the bruised muscle  Make sure the bandage is not too tight  You should be able to fit 1 finger between the bandage and your skin  · Elevate (raise) your injured body part  above the level of your heart to help decrease pain and swelling  Use pillows, blankets, or rolled towels to elevate the area as often as you can  · Do not drink alcohol  as directed  Alcohol may slow healing  · Do not stretch injured muscles  right after your injury  Ask your healthcare provider when and how you may safely stretch after your injury  Gentle stretches can help increase your flexibility  · Do not massage the area or put heating pads  on the bruise right after your injury  Heat and massage may slow healing  Your healthcare provider may tell you to apply heat after several days  At that time, heat will start to help the injury heal     Prevent another contusion:   · Stretch and warm up before you play sports or exercise  · Wear protective gear when you play sports  Examples are shin guards and padding  · If you begin a new physical activity, start slowly to give your body a chance to adjust     Follow up with your doctor as directed:  Write down your questions so you remember to ask them during your visits  © Copyright [address] Information is for End User's use only and may not be sold, redistributed or otherwise used for commercial purposes   All illustrations and images included in CareNotes® are the copyrighted property of A D A M , Inc  or Talia Washburn  The above information is an  only  It is not intended as medical advice for individual conditions or treatments  Talk to your doctor, nurse or pharmacist before following any medical regimen to see if it is safe and effective for you

## 2021-01-07 NOTE — TELEPHONE ENCOUNTER
Pura from radiology called stating the x-ray showed nondisplaced radial head/neck fracture with associated joint effusion  Sending [facility]ore e-mail here and also sending her a text    am

## 2021-01-07 NOTE — LETTER
January 7, 2021     Patient: Pearl Ferris   YOB: 1965   Date of Visit: 1/7/2021       To Whom it May Concern:    Pearl Ferris is under my professional care  She was seen in my office on 1/7/2021  She may return to work 1/13/21 with no use of her left arm  Pt must remain in sling at all times  If you have any questions or concerns, please don't hesitate to call           Sincerely,          Bolivar Garcia MD        CC: No Recipients

## 2021-01-07 NOTE — TELEPHONE ENCOUNTER
I really don't know this pt but it looks like she has a radial head fracture and a joint effusion and needs f/u with ortho

## 2021-01-08 NOTE — TELEPHONE ENCOUNTER
Spoke with patient and she is aware  She said she saw ortho yesterday and they have her f/u up in two weeks with them

## 2021-01-12 ENCOUNTER — PATIENT MESSAGE (OUTPATIENT)
Dept: FAMILY MEDICINE CLINIC | Facility: CLINIC | Age: 56
End: 2021-01-12

## 2021-01-12 ENCOUNTER — TELEPHONE (OUTPATIENT)
Dept: OTHER | Facility: OTHER | Age: 56
End: 2021-01-12

## 2021-01-12 ENCOUNTER — NURSE TRIAGE (OUTPATIENT)
Dept: OTHER | Facility: OTHER | Age: 56
End: 2021-01-12

## 2021-01-12 ENCOUNTER — OFFICE VISIT (OUTPATIENT)
Dept: FAMILY MEDICINE CLINIC | Facility: CLINIC | Age: 56
End: 2021-01-12
Payer: COMMERCIAL

## 2021-01-12 ENCOUNTER — HOSPITAL ENCOUNTER (OUTPATIENT)
Dept: RADIOLOGY | Facility: HOSPITAL | Age: 56
Discharge: HOME | End: 2021-01-12
Payer: COMMERCIAL

## 2021-01-12 VITALS
OXYGEN SATURATION: 93 % | RESPIRATION RATE: 22 BRPM | SYSTOLIC BLOOD PRESSURE: 142 MMHG | HEART RATE: 92 BPM | TEMPERATURE: 97.1 F | DIASTOLIC BLOOD PRESSURE: 82 MMHG

## 2021-01-12 DIAGNOSIS — R07.81 RIB PAIN ON RIGHT SIDE: Primary | ICD-10-CM

## 2021-01-12 DIAGNOSIS — S42.402D CLOSED FRACTURE OF LEFT ELBOW WITH ROUTINE HEALING, SUBSEQUENT ENCOUNTER: ICD-10-CM

## 2021-01-12 DIAGNOSIS — R07.81 RIB PAIN ON RIGHT SIDE: ICD-10-CM

## 2021-01-12 PROCEDURE — 71101 X-RAY EXAM UNILAT RIBS/CHEST: CPT

## 2021-01-12 PROCEDURE — 99214 OFFICE O/P EST MOD 30 MIN: CPT | Performed by: FAMILY MEDICINE

## 2021-01-12 PROCEDURE — 1036F TOBACCO NON-USER: CPT | Performed by: FAMILY MEDICINE

## 2021-01-12 PROCEDURE — 3077F SYST BP >= 140 MM HG: CPT | Performed by: FAMILY MEDICINE

## 2021-01-12 PROCEDURE — 3079F DIAST BP 80-89 MM HG: CPT | Performed by: FAMILY MEDICINE

## 2021-01-12 RX ORDER — OXYCODONE HYDROCHLORIDE AND ACETAMINOPHEN 5; 325 MG/1; MG/1
1 TABLET ORAL EVERY 4 HOURS PRN
Qty: 10 TABLET | Refills: 0 | Status: SHIPPED | OUTPATIENT
Start: 2021-01-12 | End: 2021-01-12 | Stop reason: SDUPTHER

## 2021-01-12 RX ORDER — OXYCODONE HYDROCHLORIDE AND ACETAMINOPHEN 5; 325 MG/1; MG/1
1 TABLET ORAL EVERY 4 HOURS PRN
Qty: 10 TABLET | Refills: 0 | Status: SHIPPED | OUTPATIENT
Start: 2021-01-12 | End: 2021-01-13 | Stop reason: SDUPTHER

## 2021-01-12 NOTE — PROGRESS NOTES
BMI Counseling: There is no height or weight on file to calculate BMI  The BMI is above normal  Nutrition recommendations include decreasing portion sizes, decreasing fast food intake, consuming healthier snacks, moderation in carbohydrate intake, increasing intake of lean protein, reducing intake of saturated and trans fat and reducing intake of cholesterol  Exercise recommendations include moderate physical activity 150 minutes/week and exercising 3-5 times per week  No pharmacotherapy was ordered  Assessment/Plan:     Chronic Problems:  No problem-specific Assessment & Plan notes found for this encounter  Visit Diagnosis:  Diagnoses and all orders for this visit:    Rib pain on right side  -     XR ribs right w pa chest min 3 views; Future  -     Discontinue: oxyCODONE-acetaminophen (PERCOCET) 5-325 mg per tablet; Take 1 tablet by mouth every 4 (four) hours as needed for moderate painMax Daily Amount: 6 tablets  -     Discontinue: oxyCODONE-acetaminophen (PERCOCET) 5-325 mg per tablet; Take 1 tablet by mouth every 4 (four) hours as needed for moderate painMax Daily Amount: 6 tablets    Closed fracture of left elbow with routine healing, subsequent encounter    Right rib pain/status post fall  Discussed plan care patient, approximately 1 week out from initial fall will obtain x-ray right ribs, discussed care pain control splinting  Medications called in  Call for any significant change in to questions  Left elbow fracture  Maintain scheduled follow-up with orthopedic      Subjective:    Patient ID: Smita Hollis is a 48 y o  female      F/u fall , last weds , fell while walking into work   Not workmans comp   Seen at urgent care   Seen by md - ortho , for fracture left elbow   But I feel also like a broken rib on the right   Feel on concrete , castle not recall hiitting anything specific   Difficulty/painful when taking deep inspiration negative shortness of breath  No visible injury negative bruising  Specific point tenderness right chest wall at the breast        The following portions of the patient's history were reviewed and updated as appropriate: allergies, current medications, past family history, past medical history, past social history, past surgical history and problem list     Review of Systems   Constitutional: Negative for appetite change, chills, fever and unexpected weight change  HENT: Negative for congestion, dental problem, ear pain, hearing loss, postnasal drip, rhinorrhea, sinus pressure, sinus pain, sneezing, sore throat, tinnitus and voice change  Eyes: Negative for visual disturbance  Respiratory: Negative for apnea, cough, chest tightness and shortness of breath  Cardiovascular: Negative for chest pain, palpitations and leg swelling  Gastrointestinal: Negative for abdominal pain, blood in stool, constipation, diarrhea, nausea and vomiting  Endocrine: Negative for cold intolerance, heat intolerance, polydipsia, polyphagia and polyuria  Genitourinary: Negative for decreased urine volume, difficulty urinating, dysuria, frequency and hematuria  Musculoskeletal: Positive for arthralgias (Right rib pain)  Negative for back pain, gait problem, joint swelling and myalgias  Skin: Negative for color change, rash and wound  Allergic/Immunologic: Negative for environmental allergies and food allergies  Neurological: Negative for dizziness, syncope, weakness, light-headedness, numbness and headaches  Hematological: Negative for adenopathy  Does not bruise/bleed easily  Psychiatric/Behavioral: Negative for sleep disturbance and suicidal ideas  The patient is not nervous/anxious            /82 (BP Location: Right arm, Patient Position: Sitting, Cuff Size: Adult)   Pulse 92   Temp (!) 97 1 °F (36 2 °C)   Resp 22   SpO2 93%   Social History     Socioeconomic History    Marital status: /Civil Union     Spouse name: Not on file    Number of children: Not on file    Years of education: Not on file    Highest education level: Not on file   Occupational History    Not on file   Social Needs    Financial resource strain: Not on file    Food insecurity     Worry: Not on file     Inability: Not on file    Transportation needs     Medical: Not on file     Non-medical: Not on file   Tobacco Use    Smoking status: Never Smoker    Smokeless tobacco: Never Used   Substance and Sexual Activity    Alcohol use:  Yes     Alcohol/week: 3 0 standard drinks     Types: 3 Glasses of wine per week    Drug use: Never    Sexual activity: Not on file   Lifestyle    Physical activity     Days per week: Not on file     Minutes per session: Not on file    Stress: Not on file   Relationships    Social connections     Talks on phone: Not on file     Gets together: Not on file     Attends Restorationism service: Not on file     Active member of club or organization: Not on file     Attends meetings of clubs or organizations: Not on file     Relationship status: Not on file    Intimate partner violence     Fear of current or ex partner: Not on file     Emotionally abused: Not on file     Physically abused: Not on file     Forced sexual activity: Not on file   Other Topics Concern    Not on file   Social History Narrative    Not on file     Past Medical History:   Diagnosis Date    Hypertension     Psychiatric disorder     depression     Family History   Problem Relation Age of Onset    No Known Problems Mother     No Known Problems Father      Past Surgical History:   Procedure Laterality Date    BACK SURGERY      REPLACEMENT TOTAL KNEE Bilateral        Current Outpatient Medications:     amLODIPine-benazepril (LOTREL) 10-40 MG per capsule, Take 1 pill daily, Disp: 90 capsule, Rfl: 1    buPROPion (WELLBUTRIN XL) 300 mg 24 hr tablet, Take 1 tablet (300 mg total) by mouth daily, Disp: 90 tablet, Rfl: 1    hydrochlorothiazide (HYDRODIURIL) 25 mg tablet, Take 1 tablet (25 mg total) by mouth daily, Disp: 90 tablet, Rfl: 1    Cyclogyl 0 5 % ophthalmic solution, , Disp: , Rfl:     moxifloxacin (VIGAMOX) 0 5 % ophthalmic solution, PLEASE SEE ATTACHED FOR DETAILED DIRECTIONS, Disp: , Rfl:     oxyCODONE-acetaminophen (PERCOCET) 5-325 mg per tablet, Take 1 tablet by mouth every 4 (four) hours as needed for moderate painMax Daily Amount: 6 tablets, Disp: 10 tablet, Rfl: 0    Allergies   Allergen Reactions    Azithromycin Nausea Only     Category: sensitivity;     Erythromycin           Lab Review   Appointment on 10/14/2020   Component Date Value    Hemoglobin A1C 10/14/2020 5 0     EAG 10/14/2020 97     TSH 3RD GENERATON 10/14/2020 1 820     Cholesterol 10/14/2020 193     Triglycerides 10/14/2020 63     HDL, Direct 10/14/2020 78     LDL Calculated 10/14/2020 102*    Non-HDL-Chol (CHOL-HDL) 10/14/2020 115     Sodium 10/14/2020 140     Potassium 10/14/2020 4 1     Chloride 10/14/2020 104     CO2 10/14/2020 30     ANION GAP 10/14/2020 6     BUN 10/14/2020 9     Creatinine 10/14/2020 0 72     Glucose, Fasting 10/14/2020 86     Calcium 10/14/2020 10 1     AST 10/14/2020 16     ALT 10/14/2020 23     Alkaline Phosphatase 10/14/2020 121*    Total Protein 10/14/2020 7 7     Albumin 10/14/2020 4 0     Total Bilirubin 10/14/2020 0 76     eGFR 10/14/2020 95     WBC 10/14/2020 3 94*    RBC 10/14/2020 4 62     Hemoglobin 10/14/2020 14 3     Hematocrit 10/14/2020 43 9     MCV 10/14/2020 95     MCH 10/14/2020 31 0     MCHC 10/14/2020 32 6     RDW 10/14/2020 12 8     MPV 10/14/2020 11 7     Platelets 93/53/1536 290     nRBC 10/14/2020 0     Neutrophils Relative 10/14/2020 60     Immat GRANS % 10/14/2020 0     Lymphocytes Relative 10/14/2020 24     Monocytes Relative 10/14/2020 9     Eosinophils Relative 10/14/2020 6     Basophils Relative 10/14/2020 1     Neutrophils Absolute 10/14/2020 2 35     Immature Grans Absolute 10/14/2020 0 01     Lymphocytes Absolute 10/14/2020 0 95     Monocytes Absolute 10/14/2020 0 35     Eosinophils Absolute 10/14/2020 0 24     Basophils Absolute 10/14/2020 0 04     HIV-1/HIV-2 Ab 10/14/2020 Non-Reactive    Lab Requisition on 07/15/2020   Component Date Value    SARS-CoV-2 07/15/2020 Negative         Imaging: Xr Forearm 2 Vw Left    Result Date: 1/7/2021  Narrative: LEFT FOREARM INDICATION:   S49  92XA: Unspecified injury of left shoulder and upper arm, initial encounter  COMPARISON:  None VIEWS:  XR FOREARM 2 VW LEFT FINDINGS: Nondisplaced radial head/neck fracture  Elevation of the anterior and posterior fat pads indicative of joint effusion  No significant degenerative changes  No lytic or blastic osseous lesion  Soft tissues are unremarkable  Impression: Nondisplaced radial head/neck fracture with associated joint effusion  Final radiology report is discordant with preliminary interpretation provided by the urgent care provider at the time of official interpretation  The study was marked in [facility] for immediate notification  Workstation performed: [id number]     Xr Foot 3+ Vw Right    Result Date: 12/17/2020  Narrative: RIGHT FOOT INDICATION:   M79 674: Pain in right toe(s)  COMPARISON:  None VIEWS:  XR FOOT 3+ VW RIGHT FINDINGS: There is no acute fracture or dislocation  Calcaneal spur(s) noted  Degenerative changes of the midfoot  No lytic or blastic osseous lesion  Soft tissues are unremarkable  Impression: No acute osseous abnormality  Workstation performed: [id number]       Objective:     Physical Exam  Constitutional:       General: She is not in acute distress  Appearance: She is well-developed  She is not ill-appearing or toxic-appearing  HENT:      Head: Normocephalic and atraumatic  Neck:      Musculoskeletal: Normal range of motion and neck supple  Cardiovascular:      Rate and Rhythm: Normal rate and regular rhythm  Heart sounds: Normal heart sounds     Pulmonary:      [place]: Pulmonary effort is normal       Breath sounds: Normal breath sounds  Musculoskeletal: Normal range of motion  General: Tenderness present  Arms:    Skin:     General: Skin is warm and dry  Neurological:      Mental Status: She is alert and oriented to person, place, and time  Deep Tendon Reflexes: Reflexes are normal and symmetric  Psychiatric:         Behavior: Behavior normal          Thought Content: Thought content normal          Judgment: Judgment normal            There are no Patient Instructions on file for this visit  LETICIA Foley    Portions of the record may have been created with voice recognition software  Occasional wrong word or "sound a like" substitutions may have occurred due to the inherent limitations of voice recognition software  Read the chart carefully and recognize, using context, where substitutions have occurred

## 2021-01-13 DIAGNOSIS — R07.81 RIB PAIN ON RIGHT SIDE: ICD-10-CM

## 2021-01-13 RX ORDER — OXYCODONE HYDROCHLORIDE AND ACETAMINOPHEN 5; 325 MG/1; MG/1
1 TABLET ORAL EVERY 4 HOURS PRN
Qty: 10 TABLET | Refills: 0 | Status: SHIPPED | OUTPATIENT
Start: 2021-01-13 | End: 2021-03-10 | Stop reason: ALTCHOICE

## 2021-01-13 RX ORDER — OXYCODONE HYDROCHLORIDE AND ACETAMINOPHEN 5; 325 MG/1; MG/1
1 TABLET ORAL EVERY 4 HOURS PRN
Qty: 10 TABLET | Refills: 0 | Status: CANCELLED | OUTPATIENT
Start: 2021-01-13

## 2021-01-13 NOTE — TELEPHONE ENCOUNTER
----- Message from [place], [address] sent at 1/13/2021 11:14 AM EST -----  Inform healing right rib  fracture

## 2021-01-13 NOTE — TELEPHONE ENCOUNTER
Patient is calling in regarding oxycodone-acetaminophen 5-325 mg (1 tablet every 4 hours PRN, 10 tablets) that she was prescribed today by Dr Polina Edmond for rib pain related to a recent fall  She is stating she has been on the phone back and forth with the pharmacy and they kept telling her they did not receive the medication but on our end it looks like it was sent through  The pharmacy is now closed  The patient is in severe pain and is requesting the prescription to be sent to [facility] in [facility] as that is open until 1:30am so she is able to get the medication  Spoke to on call provider, Juan Jose Brown, and she stated she is unable to prescribe controlled substances at this time since there is not a way to verify this  Suggested for patient to take tylenol and use heat and call first thing in the morning  Spoke to patient with the recommendations  Patient verbalized understanding

## 2021-01-13 NOTE — TELEPHONE ENCOUNTER
Regarding: Medication   ----- Message from Sebastien Samaniego sent at 1/12/2021  9:31 PM EST -----  Pain medication was sent to the pharmacy this afternoon  [facility] is telling her they don't have a script

## 2021-01-13 NOTE — TELEPHONE ENCOUNTER
Reason for Disposition   [1] Prescription not at pharmacy AND [2] was prescribed by PCP recently    Answer Assessment - Initial Assessment Questions  1  NAME of MEDICATION: "What medicine are you calling about?"      Percocet      2  QUESTION: "What is your question?"      Medication was sent to the pharmacy today but the pharmacy says they do not have it     3  PRESCRIBING HCP: "Who prescribed it?" Reason: if prescribed by specialist, call should be referred to that group  Dr Jaci Boxer    4  SYMPTOMS: "Do you have any symptoms?"     Rib pain     5  SEVERITY: If symptoms are present, ask "Are they mild, moderate or severe?"      8/10 pain in ribs     6    PREGNANCY:  "Is there any chance that you are pregnant?" "When was your last menstrual period?"     Denies    Protocols used: MEDICATION QUESTION CALL-ADULT-

## 2021-01-13 NOTE — TELEPHONE ENCOUNTER
[facility] stated [facility] never received [facility]  They told her it was never sent  As per script, it says "receipt confirmed by pharmacy at 5:54 PM) They would not give it to her  She called the health line and they asked [place]ola if she could fill it and she could not  Could you please resend the medication in to a different pharmacy today please

## 2021-01-21 ENCOUNTER — APPOINTMENT (OUTPATIENT)
Dept: RADIOLOGY | Facility: CLINIC | Age: 56
End: 2021-01-21
Payer: COMMERCIAL

## 2021-01-21 ENCOUNTER — OFFICE VISIT (OUTPATIENT)
Dept: OBGYN CLINIC | Facility: CLINIC | Age: 56
End: 2021-01-21

## 2021-01-21 VITALS
BODY MASS INDEX: 46.1 KG/M2 | SYSTOLIC BLOOD PRESSURE: 137 MMHG | HEIGHT: 64 IN | WEIGHT: 270 LBS | DIASTOLIC BLOOD PRESSURE: 85 MMHG | HEART RATE: 83 BPM

## 2021-01-21 DIAGNOSIS — S52.135D CLOSED NONDISPLACED FRACTURE OF NECK OF LEFT RADIUS WITH ROUTINE HEALING, SUBSEQUENT ENCOUNTER: Primary | ICD-10-CM

## 2021-01-21 DIAGNOSIS — S52.135D CLOSED NONDISPLACED FRACTURE OF NECK OF LEFT RADIUS WITH ROUTINE HEALING, SUBSEQUENT ENCOUNTER: ICD-10-CM

## 2021-01-21 PROCEDURE — 73080 X-RAY EXAM OF ELBOW: CPT

## 2021-01-21 PROCEDURE — 99024 POSTOP FOLLOW-UP VISIT: CPT | Performed by: ORTHOPAEDIC SURGERY

## 2021-01-21 PROCEDURE — 3008F BODY MASS INDEX DOCD: CPT | Performed by: FAMILY MEDICINE

## 2021-01-21 NOTE — LETTER
January 21, 2021     Patient: Lux Cortes   YOB: 1965   Date of Visit: 1/21/2021       To Whom it May Concern:    Lux Cortes is under my professional care  She was seen in my office on 1/21/2021  She may return to work on light duty with no use of the left upper extremity  She will follow up in 6 weeks for re-evaluation  If you have any questions or concerns, please don't hesitate to call           Sincerely,          Demetrius Sims MD        CC: No Recipients

## 2021-01-21 NOTE — PROGRESS NOTES
CHIEF COMPLAINT:  Chief Complaint   Patient presents with    Left Elbow - Follow-up       SUBJECTIVE:  Pearl Ferris is a 48y o  year old female who presents for follow-up regarding left elbow radial head fracture  Patient's initial injury occurred on 01/06/2020  Patient has been treated conservatively with the use of a sling  She states that she still has some tenderness noted over the radial head and neck  She has been working on her range of motion  She uses a sling for comfort  She denies any numbness or tingling  PAST MEDICAL HISTORY:  Past Medical History:   Diagnosis Date    Hypertension     Psychiatric disorder     depression       PAST SURGICAL HISTORY:  Past Surgical History:   Procedure Laterality Date    BACK SURGERY      REPLACEMENT TOTAL KNEE Bilateral        FAMILY HISTORY:  Family History   Problem Relation Age of Onset    No Known Problems Mother     No Known Problems Father        SOCIAL HISTORY:  Social History     Tobacco Use    Smoking status: Never Smoker    Smokeless tobacco: Never Used   Substance Use Topics    Alcohol use:  Yes     Alcohol/week: 3 0 standard drinks     Types: 3 Glasses of wine per week    Drug use: Never       MEDICATIONS:    Current Outpatient Medications:     amLODIPine-benazepril (LOTREL) 10-40 MG per capsule, Take 1 pill daily, Disp: 90 capsule, Rfl: 1    buPROPion (WELLBUTRIN XL) 300 mg 24 hr tablet, Take 1 tablet (300 mg total) by mouth daily, Disp: 90 tablet, Rfl: 1    Cyclogyl 0 5 % ophthalmic solution, , Disp: , Rfl:     hydrochlorothiazide (HYDRODIURIL) 25 mg tablet, Take 1 tablet (25 mg total) by mouth daily, Disp: 90 tablet, Rfl: 1    moxifloxacin (VIGAMOX) 0 5 % ophthalmic solution, PLEASE SEE ATTACHED FOR DETAILED DIRECTIONS, Disp: , Rfl:     oxyCODONE-acetaminophen (PERCOCET) 5-325 mg per tablet, Take 1 tablet by mouth every 4 (four) hours as needed for moderate painMax Daily Amount: 6 tablets, Disp: 10 tablet, Rfl: 0    ALLERGIES:  Allergies   Allergen Reactions    Azithromycin Nausea Only     Category: sensitivity;     Erythromycin        REVIEW OF SYSTEMS:  Review of Systems  ROS:   General: no fever, no chills  HEENT:  No loss of hearing or eyesight problems  Eyes:  No red eyes  Respiratory:  No coughing, shortness of breath or wheezing  Cardiovascular:  No chest pain, no palpitations  GI:  Abdomen soft nontender, denies nausea  Endocrine:  No muscle weakness, no frequent urination, no excessive thirst  Urinary:  No dysuria, no incontinence  Musculoskeletal: see HPI and PE  SKIN:  No skin rash, no dry skin  Neurological:  No headaches, no confusion  Psychiatric:  No suicide thoughts, no anxiety, no depression  Review of all other systems is negative    VITALS:  Vitals:    01/21/21 1409   BP: 137/85   Pulse: 83       LABS:  HgA1c:   Lab Results   Component Value Date    HGBA1C 5 0 10/14/2020     BMP:   Lab Results   Component Value Date    CALCIUM 10 1 10/14/2020    K 4 1 10/14/2020    CO2 30 10/14/2020     10/14/2020    BUN 9 10/14/2020    CREATININE 0 72 10/14/2020       _____________________________________________________  PHYSICAL EXAMINATION:  General: well developed and well nourished, alert, oriented times 3 and appears comfortable  Psychiatric: Normal  HEENT: Trachea Midline, No torticollis  Pulmonary: No audible wheezing or respiratory distress   Skin: No masses, erythema, lacerations, fluctation, ulcerations  Neurovascular: Sensation Intact to the Median, Ulnar, Radial Nerve, Motor Intact to the Median, Ulnar, Radial Nerve and Pulses Intact    MUSCULOSKELETAL EXAMINATION:  Left elbow  No erythema edema or ecchymosis noted, skin is warm to touch  Tenderness to palpation over the radial head  Slight decrease in full extension on the left side compared to the contralateral side  Elbow stable to valgus and varus test  Patient is neurovascular intact    ___________________________________________________  STUDIES REVIEWED:  Images obtained today of the Left elbow 3 views demonstrate Fracture in stable alignment      PROCEDURES PERFORMED:  Procedures  No Procedures performed today    _____________________________________________________  ASSESSMENT/PLAN:    Left radial neck fracture - nondisplaced impacted  - patient was advised to start to work with therapy on her range of motion in 2 weeks  - she was advised that she can come out of the sling as tolerated  - she was given a work note for no use of the left upper extremity  - she will follow-up in 6 weeks for re-evaluation      Follow Up:  Return in about 6 weeks (around 3/4/2021)  Work/school status:  No use of left upper extremity    To Do Next Visit:  Re-evaluation of current issue and X-rays of the  left  elbow      Scribe Attestation    I,:  Freddy Huynh PA-C am acting as a scribe while in the presence of the attending physician :       I,:  Ada Alvarez MD personally performed the services described in this documentation    as scribed in my presence :           Portions of the record may have been created with voice recognition software  Occasional wrong word or "sound a like" substitutions may have occurred due to the inherent limitations of voice recognition software  Read the chart carefully and recognize, using context, where substitutions have occurred

## 2021-02-04 ENCOUNTER — TELEPHONE (OUTPATIENT)
Dept: FAMILY MEDICINE CLINIC | Facility: CLINIC | Age: 56
End: 2021-02-04

## 2021-02-04 DIAGNOSIS — F51.01 PRIMARY INSOMNIA: Primary | ICD-10-CM

## 2021-02-04 RX ORDER — ZOLPIDEM TARTRATE 5 MG/1
5 TABLET ORAL
Qty: 30 TABLET | Refills: 1 | Status: SHIPPED | OUTPATIENT
Start: 2021-02-04 | End: 2021-02-05 | Stop reason: SDUPTHER

## 2021-02-04 NOTE — TELEPHONE ENCOUNTER
Medline refill request for ambien  Order was discontinued on 1/7/2021 patient has asked to be put back on medication  PDMP reviewed last refill 10/28/2020 for Ambien    Other controlled medication included Percocet last date refilled 1/13/2021

## 2021-02-05 DIAGNOSIS — F51.01 PRIMARY INSOMNIA: ICD-10-CM

## 2021-02-05 RX ORDER — ZOLPIDEM TARTRATE 5 MG/1
5 TABLET ORAL
Qty: 30 TABLET | Refills: 1 | Status: SHIPPED | OUTPATIENT
Start: 2021-02-05 | End: 2021-03-23 | Stop reason: SDUPTHER

## 2021-02-05 NOTE — TELEPHONE ENCOUNTER
Patients Ambien was sent to the wrong pharmacy, I already called and canceled it, could you please resend this to the correct pharmacy? [facility] [place]?

## 2021-03-05 ENCOUNTER — TELEPHONE (OUTPATIENT)
Dept: ADMINISTRATIVE | Facility: OTHER | Age: 56
End: 2021-03-05

## 2021-03-05 NOTE — TELEPHONE ENCOUNTER
Upon review of the In Basket request we were able to locate, review, and update the patient chart as requested for Pap Smear (HPV) aka Cervical Cancer Screening  Any additional questions or concerns should be emailed to the Practice Liaisons via [email or web address] email, please do not reply via In Basket      Thank you  Yo Marie

## 2021-03-05 NOTE — TELEPHONE ENCOUNTER
----- Message from Shawn Adames, [address] sent at 3/5/2021  8:34 AM EST -----  Regarding: PAP  03/05/21 8:34 AM    Hello, our patient Juan C Foote has had Pap Smear (HPV) aka Cervical Cancer Screening completed/performed  Please assist in updating the patient chart by pulling a previous Electronic Medical Record (EMR) document  The previous EMR is in care everywhere, [facility]  The date of service is 2016  Thank you,  SIXTO Lind PG

## 2021-03-09 NOTE — PROGRESS NOTES
[facility]        NAME: Violeta Oconnor is a 48 y o  female  : 1965    MRN: [id number]  DATE: 2021  TIME: 10:01 PM    Assessment and Plan   Injury of left forearm, initial encounter [S59 912A]  1  Injury of left forearm, initial encounter  XR forearm 2 vw left     No fracture dislocation noted on x-ray  Likely contusion from falling  Recommended Tylenol or Motrin for pain and icing  Patient Instructions   Tylenol or Motrin for pain  Ice the forearm  Elevate  Follow up with PCP in 3-5 days  Proceed to  ER if symptoms worsen  Chief Complaint     Chief Complaint   Patient presents with    Arm Injury     Pt fell and injuried her left forearm tonight  History of Present Illness       Patient is a 60-year-old female who presents today with complaints of left forearm pain  She states she fell forward onto the concrete tonight  Reports pain to outer forearm and not wrist   No swelling or bruising  No numbness or tingling  Does have some pain in her right rib area as well but does not think she struck this area  Review of Systems   Review of Systems   Constitutional: Negative for fever  Respiratory: Negative for shortness of breath  Cardiovascular: Negative for chest pain  Musculoskeletal: Positive for myalgias  Negative for joint swelling  Skin: Negative for color change and wound           Current Medications       Current Outpatient Medications:     amLODIPine-benazepril (LOTREL) 10-40 MG per capsule, Take 1 pill daily, Disp: 90 capsule, Rfl: 1    buPROPion (WELLBUTRIN XL) 300 mg 24 hr tablet, Take 1 tablet (300 mg total) by mouth daily, Disp: 90 tablet, Rfl: 1    hydrochlorothiazide (HYDRODIURIL) 25 mg tablet, Take 1 tablet (25 mg total) by mouth daily, Disp: 90 tablet, Rfl: 1    Cyclogyl 0 5 % ophthalmic solution, , Disp: , Rfl:     ergocalciferol (VITAMIN D2) 50,000 units, Take 1 capsule (50,000 Units total) by mouth 3 (three) times a week (Patient not taking: Reported on 12/17/2020), Disp: 12 capsule, Rfl: 0    moxifloxacin (VIGAMOX) 0 5 % ophthalmic solution, PLEASE SEE ATTACHED FOR DETAILED DIRECTIONS, Disp: , Rfl:     zolpidem (AMBIEN) 5 mg tablet, Take 1 tablet (5 mg total) by mouth daily at bedtime as needed for sleep (Patient not taking: Reported on 1/6/2021), Disp: 30 tablet, Rfl: 3    Current Allergies     Allergies as of 01/06/2021 - Reviewed 01/06/2021   Allergen Reaction Noted    Azithromycin Nausea Only 08/17/2016    Erythromycin  08/17/2016            The following portions of the patient's history were reviewed and updated as appropriate: allergies, current medications, past family history, past medical history, past social history, past surgical history and problem list      Past Medical History:   Diagnosis Date    Hypertension     Psychiatric disorder     depression       Past Surgical History:   Procedure Laterality Date    BACK SURGERY      REPLACEMENT TOTAL KNEE Bilateral        Family History   Problem Relation Age of Onset    No Known Problems Mother     No Known Problems Father          Medications have been verified  Objective   /90   Pulse 86   Temp (!) 96 7 °F (35 9 °C) (Temporal)   Resp 18   Ht 5' 4" (1 626 m)   Wt 122 kg (270 lb)   SpO2 99%   BMI 46 35 kg/m²        Physical Exam     Physical Exam  Constitutional:       General: She is not in acute distress  Appearance: Normal appearance  She is obese  Cardiovascular:      Rate and Rhythm: Normal rate and regular rhythm  Pulmonary:      Effort: Pulmonary effort is normal       Breath sounds: Normal breath sounds  Chest:      Chest wall: Tenderness present  No deformity or crepitus  Musculoskeletal: Normal range of motion  General: Tenderness present  No swelling or deformity  Left forearm: She exhibits tenderness and bony tenderness  She exhibits no swelling, no edema, no deformity and no laceration          Arms:    Skin: General: Skin is warm and dry  Neurological:      Mental Status: She is alert  T&S  CT/n/a

## 2021-03-10 ENCOUNTER — OFFICE VISIT (OUTPATIENT)
Dept: FAMILY MEDICINE CLINIC | Facility: CLINIC | Age: 56
End: 2021-03-10
Payer: COMMERCIAL

## 2021-03-10 VITALS
SYSTOLIC BLOOD PRESSURE: 144 MMHG | OXYGEN SATURATION: 99 % | DIASTOLIC BLOOD PRESSURE: 84 MMHG | BODY MASS INDEX: 47.29 KG/M2 | WEIGHT: 277 LBS | HEIGHT: 64 IN | TEMPERATURE: 100.1 F | HEART RATE: 97 BPM

## 2021-03-10 DIAGNOSIS — F32.A DEPRESSION, UNSPECIFIED DEPRESSION TYPE: ICD-10-CM

## 2021-03-10 DIAGNOSIS — Z12.31 VISIT FOR SCREENING MAMMOGRAM: ICD-10-CM

## 2021-03-10 DIAGNOSIS — Z12.11 SCREENING FOR COLON CANCER: ICD-10-CM

## 2021-03-10 DIAGNOSIS — F51.01 PRIMARY INSOMNIA: ICD-10-CM

## 2021-03-10 DIAGNOSIS — I10 ESSENTIAL HYPERTENSION: Primary | ICD-10-CM

## 2021-03-10 PROCEDURE — 3077F SYST BP >= 140 MM HG: CPT | Performed by: FAMILY MEDICINE

## 2021-03-10 PROCEDURE — 3008F BODY MASS INDEX DOCD: CPT | Performed by: FAMILY MEDICINE

## 2021-03-10 PROCEDURE — 3079F DIAST BP 80-89 MM HG: CPT | Performed by: FAMILY MEDICINE

## 2021-03-10 PROCEDURE — 1036F TOBACCO NON-USER: CPT | Performed by: FAMILY MEDICINE

## 2021-03-10 PROCEDURE — 99396 PREV VISIT EST AGE 40-64: CPT | Performed by: FAMILY MEDICINE

## 2021-03-10 RX ORDER — DIPHENOXYLATE HYDROCHLORIDE AND ATROPINE SULFATE 2.5; .025 MG/1; MG/1
1 TABLET ORAL DAILY
COMMUNITY

## 2021-03-10 RX ORDER — VALSARTAN 320 MG/1
320 TABLET ORAL DAILY
Qty: 90 TABLET | Refills: 3 | Status: SHIPPED | OUTPATIENT
Start: 2021-03-10 | End: 2022-02-16

## 2021-03-10 RX ORDER — PHENOL 1.4 %
600 AEROSOL, SPRAY (ML) MUCOUS MEMBRANE 2 TIMES DAILY WITH MEALS
COMMUNITY
End: 2022-02-15 | Stop reason: HOSPADM

## 2021-03-10 NOTE — PROGRESS NOTES
[facility]    NAME: Enolia Bloch  AGE: 48 y o  SEX: female  : 1965     DATE: 3/10/2021     Assessment and Plan:     Problem List Items Addressed This Visit        Cardiovascular and Mediastinum    Essential hypertension - Primary      Patient presents today to establish care and change medical providers she lives near this area by my office and is requesting a change in blood pressure medication possibly as well  She did not like amlodipine and would like to try valsartan patient previously took 320 mg along with hydrochlorothiazide and did well on this medicine was happy with the results and does not like the side effects of the amlodipine combination           Relevant Medications    valsartan (DIOVAN) 320 MG tablet    Other Relevant Orders    Mammo screening bilateral w 3d & cad    Cologuard    CBC and differential    Comprehensive metabolic panel    Lipid panel    TSH, 3rd generation with Free T4 reflex    T4, free       Other    Depression      Continue bupropion at current dose and follow-up next office visit         Relevant Medications    valsartan (DIOVAN) 320 MG tablet    Other Relevant Orders    Mammo screening bilateral w 3d & cad    Cologuard    CBC and differential    Comprehensive metabolic panel    Lipid panel    TSH, 3rd generation with Free T4 reflex    T4, free    Primary insomnia      Longstanding insomnia patient has been on Ambien at 5 mg tablets continue medication until further workup and evaluation established         Relevant Medications    valsartan (DIOVAN) 320 MG tablet    Other Relevant Orders    Mammo screening bilateral w 3d & cad    Cologuard    CBC and differential    Comprehensive metabolic panel    Lipid panel    TSH, 3rd generation with Free T4 reflex    T4, free      Other Visit Diagnoses     Screening for colon cancer        Relevant Orders    Cologuard    CBC and differential Comprehensive metabolic panel    Lipid panel    TSH, 3rd generation with Free T4 reflex    T4, free    Visit for screening mammogram        Relevant Orders    Mammo screening bilateral w 3d & cad    CBC and differential    Comprehensive metabolic panel    Lipid panel    TSH, 3rd generation with Free T4 reflex    T4, free          Immunizations and preventive care screenings were discussed with patient today  Appropriate education was printed on patient's after visit summary  Counseling:  Alcohol/drug use: discussed moderation in alcohol intake, the recommendations for healthy alcohol use, and avoidance of illicit drug use  Dental Health: discussed importance of regular tooth brushing, flossing, and dental visits  Injury prevention: discussed safety/seat belts, safety helmets, smoke detectors, carbon dioxide detectors, and smoking near bedding or upholstery  Sexual health: discussed sexually transmitted diseases, partner selection, use of condoms, avoidance of unintended pregnancy, and contraceptive alternatives  · Exercise: the importance of regular exercise/physical activity was discussed  Recommend exercise 3-5 times per week for at least 30 minutes  Return in about 6 months (around 9/10/2021)  Chief Complaint:     Chief Complaint   Patient presents with   [facility] Establish Care     patient would like a new medication pt states she does not like amlodipine , pt requesting valsartan      Blood Pressure Check     patient in office to establish care from [facility] , patient states her BP has not been getting managed the way it should       History of Present Illness:     Adult Annual Physical   Patient here for a comprehensive physical exam  The patient reports no problems  Diet and Physical Activity  · Diet/Nutrition: heart healthy (low sodium) diet  · Exercise: walking        Depression Screening  PHQ-9 Depression Screening    PHQ-9:   Frequency of the following problems over the past two weeks:           General Health  · Sleep: sleeps poorly  · Hearing: normal - bilateral   · Vision: wears glasses  · Dental: regular dental visits  /GYN Health  · Patient is: postmenopausal  · Last menstrual period:   · Contraceptive method:      Review of Systems:     Review of Systems   Constitutional: Negative for chills, fatigue and fever  HENT: Negative for congestion, nosebleeds, rhinorrhea, sinus pressure and sore throat  Eyes: Negative for discharge and redness  Respiratory: Negative for cough and shortness of breath  Cardiovascular: Negative for chest pain, palpitations and leg swelling  Gastrointestinal: Negative for abdominal pain, blood in stool and nausea  Endocrine: Negative for cold intolerance, heat intolerance and polyuria  Genitourinary: Negative for dysuria and frequency  Musculoskeletal: Negative for arthralgias, back pain and myalgias  Skin: Negative for rash  Neurological: Negative for dizziness, weakness and headaches  Hematological: Negative for adenopathy  Psychiatric/Behavioral: Negative for behavioral problems and sleep disturbance  The patient is not nervous/anxious         Past Medical History:     Past Medical History:   Diagnosis Date    Hypertension     Psychiatric disorder     depression      Past Surgical History:     Past Surgical History:   Procedure Laterality Date    BACK SURGERY      REPLACEMENT TOTAL KNEE Bilateral       Social History:     E-Cigarette/Vaping    E-Cigarette Use Never User      E-Cigarette/Vaping Substances    Nicotine No     THC No     CBD No     Flavoring No     Other No     Unknown No      Social History     Socioeconomic History    Marital status: /Civil Union     Spouse name: None    Number of children: None    Years of education: None    Highest education level: None   Occupational History    None   Social Needs    Financial resource strain: None    Food insecurity     Worry: None Inability: None    Transportation needs     Medical: None     Non-medical: None   Tobacco Use    Smoking status: Never Smoker    Smokeless tobacco: Never Used   Substance and Sexual Activity    Alcohol use: Yes     Alcohol/week: 3 0 standard drinks     Types: 3 Glasses of wine per week    Drug use: Never    Sexual activity: None   Lifestyle    Physical activity     Days per week: None     Minutes per session: None    Stress: None   Relationships    Social connections     Talks on phone: None     Gets together: None     Attends Rastafarian service: None     Active member of club or organization: None     Attends meetings of clubs or organizations: None     Relationship status: None    Intimate partner violence     Fear of current or ex partner: None     Emotionally abused: None     Physically abused: None     Forced sexual activity: None   Other Topics Concern    None   Social History Narrative    None      Family History:     Family History   Problem Relation Age of Onset    No Known Problems Mother     No Known Problems Father       Current Medications:     Current Outpatient Medications   Medication Sig Dispense Refill    buPROPion (WELLBUTRIN XL) 300 mg 24 hr tablet Take 1 tablet (300 mg total) by mouth daily 90 tablet 1    calcium carbonate (OS-REI) 600 MG tablet Take 600 mg by mouth 2 (two) times a day with meals      Cyclogyl 0 5 % ophthalmic solution       hydrochlorothiazide (HYDRODIURIL) 25 mg tablet Take 1 tablet (25 mg total) by mouth daily 90 tablet 1    multivitamin (THERAGRAN) TABS Take 1 tablet by mouth daily      zolpidem (AMBIEN) 5 mg tablet Take 1 tablet (5 mg total) by mouth daily at bedtime as needed for sleep 30 tablet 1    valsartan (DIOVAN) 320 MG tablet Take 1 tablet (320 mg total) by mouth daily 90 tablet 3     No current facility-administered medications for this visit  Allergies:      Allergies   Allergen Reactions    Azithromycin Nausea Only     Category: sensitivity;     Erythromycin       Physical Exam:     /84   Pulse 97   Temp 100 1 °F (37 8 °C)   Ht 5' 4" (1 626 m)   Wt 126 kg (277 lb)   SpO2 99%   BMI 47 55 kg/m²     Physical Exam  Vitals signs and nursing note reviewed  Constitutional:       General: She is not in acute distress  Appearance: She is well-developed  HENT:      Head: Normocephalic and atraumatic  Eyes:      Conjunctiva/sclera: Conjunctivae normal    Neck:      Musculoskeletal: Neck supple  Cardiovascular:      Rate and Rhythm: Normal rate and regular rhythm  Heart sounds: No murmur  Pulmonary:      Effort: Pulmonary effort is normal  No respiratory distress  Breath sounds: Normal breath sounds  Abdominal:      Palpations: Abdomen is soft  Tenderness: There is no abdominal tenderness  Skin:     General: Skin is warm and dry  Neurological:      Mental Status: She is alert            Land ERICH'Sravanthi, DO  [facility]

## 2021-03-10 NOTE — ASSESSMENT & PLAN NOTE
Patient presents today to establish care and change medical providers she lives near this area by my office and is requesting a change in blood pressure medication possibly as well  She did not like amlodipine and would like to try valsartan patient previously took 320 mg along with hydrochlorothiazide and did well on this medicine was happy with the results and does not like the side effects of the amlodipine combination

## 2021-03-10 NOTE — ASSESSMENT & PLAN NOTE
Longstanding insomnia patient has been on Ambien at 5 mg tablets continue medication until further workup and evaluation established

## 2021-03-10 NOTE — PATIENT INSTRUCTIONS
Heart Healthy Diet   WHAT YOU NEED TO KNOW:   A heart healthy diet is an eating plan low in unhealthy fats and sodium (salt)  The plan is high in healthy fats and fiber  A heart healthy diet helps improve your cholesterol levels and lowers your risk for heart disease and stroke  A dietitian will teach you how to read and understand food labels  DISCHARGE INSTRUCTIONS:   Heart healthy diet guidelines to follow:   · Choose foods that contain healthy fats  ? Unsaturated fats  include monounsaturated and polyunsaturated fats  Unsaturated fat is found in foods such as soybean, canola, olive, corn, and safflower oils  It is also found in soft tub margarine that is made with liquid vegetable oil  ? Omega-3 fat  is found in certain fish, such as salmon, tuna, and trout, and in walnuts and flaxseed  Eat fish high in omega-3 fats at least 2 times a week  · Get 20 to 30 grams of fiber each day  Fruits, vegetables, whole-grain foods, and legumes (cooked beans) are good sources of fiber  · Limit or do not have unhealthy fats  ? Cholesterol  is found in animal foods, such as eggs and lobster, and in dairy products made from whole milk  Limit cholesterol to less than 200 mg each day  ? Saturated fat  is found in meats, such as sousa and hamburger  It is also found in chicken or turkey skin, whole milk, and butter  Limit saturated fat to less than 7% of your total daily calories  ? Trans fat  is found in packaged foods, such as potato chips and cookies  It is also in hard margarine, some fried foods, and shortening  Do not eat foods that contain trans fats  · Limit sodium as directed  You may be told to limit sodium to 2,000 to 2,300 mg each day  Choose low-sodium or no-salt-added foods  Add little or no salt to food you prepare  Use herbs and spices in place of salt         Include the following in your heart healthy plan:  Ask your dietitian or healthcare provider how many servings to have from each of the following food groups:  · Grains:      ? Whole-wheat breads, cereals, and pastas, and brown rice    ? Low-fat, low-sodium crackers and chips    · Vegetables:      ? Broccoli, green beans, green peas, and spinach    ? Collards, kale, and lima beans    ? Carrots, sweet potatoes, tomatoes, and peppers    ? Canned vegetables with no salt added    · Fruits:      ? Bananas, peaches, pears, and pineapple    ? Grapes, raisins, and dates    ? Oranges, tangerines, grapefruit, orange juice, and grapefruit juice    ? Apricots, mangoes, melons, and papaya    ? Raspberries and strawberries    ? Canned fruit with no added sugar    · Low-fat dairy:      ? Nonfat (skim) milk, 1% milk, and low-fat almond, cashew, or soy milks fortified with calcium    ? Low-fat cheese, regular or frozen yogurt, and cottage cheese    · Meats and proteins:      ? Lean cuts of beef and pork (loin, leg, round), skinless chicken and turkey    ? Legumes, soy products, egg whites, or nuts    Limit or do not include the following in your heart healthy plan:   · Unhealthy fats and oils:      ? Whole or 2% milk, cream cheese, sour cream, or cheese    ? High-fat cuts of beef (T-bone steaks, ribs), chicken or turkey with skin, and organ meats such as liver    ? Butter, stick margarine, shortening, and cooking oils such as coconut or palm oil    · Foods and liquids high in sodium:      ? Packaged foods, such as frozen dinners, cookies, macaroni and cheese, and cereals with more than 300 mg of sodium per serving    ? Vegetables with added sodium, such as instant potatoes, vegetables with added sauces, or regular canned vegetables    ? Cured or smoked meats, such as hot dogs, sousa, and sausage    ? High-sodium ketchup, barbecue sauce, salad dressing, pickles, olives, soy sauce, or miso    · Foods and liquids high in sugar:      ? Candy, cake, cookies, pies, or doughnuts    ? Soft drinks (soda), sports drinks, or sweetened tea    ?  Canned or dry mixes for cakes, soups, sauces, or gravies    Other healthy heart guidelines:   · Do not smoke  Nicotine and other chemicals in cigarettes and cigars can cause lung and heart damage  Ask your healthcare provider for information if you currently smoke and need help to quit  E-cigarettes or smokeless tobacco still contain nicotine  Talk to your healthcare provider before you use these products  · Limit or do not drink alcohol as directed  Alcohol can damage your heart and raise your blood pressure  Your healthcare provider may give you specific daily and weekly limits  The general recommended limit is 1 drink a day for women 21 or older and for men 72 or older  Do not have more than 3 drinks in a day or 7 in a week  The recommended limit is 2 drinks a day for men 24to 59years of age  Do not have more than 4 drinks in a day or 14 in a week  A drink of alcohol is 12 ounces of beer, 5 ounces of wine, or 1½ ounces of liquor  · Exercise regularly  Exercise can help you maintain a healthy weight and improve your blood pressure and cholesterol levels  Regular exercise can also decrease your risk for heart problems  Ask your healthcare provider about the best exercise plan for you  Do not start an exercise program without asking your healthcare provider  Follow up with your doctor or cardiologist as directed:  Write down your questions so you remember to ask them during your visits  © Copyright [address] Information is for End User's use only and may not be sold, redistributed or otherwise used for commercial purposes  All illustrations and images included in CareNotes® are the copyrighted property of A D A M , Inc  or [address] [place] [address]  The above information is an  only  It is not intended as medical advice for individual conditions or treatments  Talk to your doctor, nurse or pharmacist before following any medical regimen to see if it is safe and effective for you

## 2021-03-19 ENCOUNTER — EVALUATION (OUTPATIENT)
Dept: PHYSICAL THERAPY | Age: 56
End: 2021-03-19
Payer: COMMERCIAL

## 2021-03-19 DIAGNOSIS — S52.122A CLOSED DISPLACED FRACTURE OF HEAD OF LEFT RADIUS, INITIAL ENCOUNTER: Primary | ICD-10-CM

## 2021-03-19 PROCEDURE — 97162 PT EVAL MOD COMPLEX 30 MIN: CPT | Performed by: PHYSICAL THERAPIST

## 2021-03-19 PROCEDURE — 97110 THERAPEUTIC EXERCISES: CPT | Performed by: PHYSICAL THERAPIST

## 2021-03-19 PROCEDURE — 97140 MANUAL THERAPY 1/> REGIONS: CPT | Performed by: PHYSICAL THERAPIST

## 2021-03-19 NOTE — PROGRESS NOTES
PT Evaluation     Today's date: 3/19/2021  Patient name: Demetria Underwood  : 1965  MRN: [id number]  Referring provider: Darryl Mclaughlin PA-C  Dx:   Encounter Diagnosis     ICD-10-CM    1  Closed displaced fracture of head of left radius, initial encounter  S52 122A        Start Time: 1600  Stop Time: 1700  Total time in clinic (min): 60 minutes    Assessment  Assessment details: Demetria Underwood is a 48 y o  female who presents with pain, decreased strength, decreased ROM, decreased joint mobility and decreased sensation  Due to these impairments, Patient has difficulty performing a/iadls  Patient's clinical presentation is consistent with their referring diagnosis of left radial head fracture  Patient would benefit from skilled physical therapy to address their aforementioned impairments, improve their level of function and to improve their overall quality of life  Impairments: abnormal muscle tone, abnormal or restricted ROM, abnormal movement, activity intolerance, impaired physical strength, lacks appropriate home exercise program, pain with function, poor posture  and poor body mechanics  Understanding of Dx/Px/POC: good   Prognosis: good    Goals  ST-3 WEEKS  1  Decrease pain by 2 points on VAS at its worst   2   Increase ROM by > 5 deg in all deficients planes  3   Increase UE by 1/2 MMT grade in all deficient planes  LT-6 WEEKS  1  Patient to be independent with a/iadls especially with lifting and turning door knob  2  Increase functional activities for leisure and home activities to previous LOF    3  Independent with HEP and/or fitness program     Plan  Patient would benefit from: skilled physical therapy  Planned modality interventions: cryotherapy, electrical stimulation/Russian stimulation, thermotherapy: hydrocollator packs and unattended electrical stimulation  Planned therapy interventions: activity modification, behavior modification, body mechanics training, aquatic therapy, flexibility, functional ROM exercises, home exercise program, IADL retraining, joint mobilization, manual therapy, neuromuscular re-education, patient education, postural training, strengthening, stretching, therapeutic activities and therapeutic exercise  Frequency: 2x week (2-3x week)  Duration in weeks: 12  Plan of Care beginning date: 3/19/2021  Plan of Care expiration date: 2021  Treatment plan discussed with: patient        Subjective Evaluation    History of Present Illness  Date of onset: 2021  Mechanism of injury: Chris Kid down and fractured left radius, sling x 6 weeks , complains of left elbow stiffness and weakness and difficulty lifting and performing all ADL's          Not a recurrent problem   Quality of life: good    Pain  Current pain ratin  At best pain ratin  At worst pain ratin  Quality: tight and pressure  Relieving factors: rest  Aggravating factors: eating and lifting  Progression: improved    Hand dominance: right      Diagnostic Tests  X-ray: abnormal  Treatments  Previous treatment: immobilization  Patient Goals  Patient goals for therapy: increased motion, increased strength and independence with ADLs/IADLs          Objective     Active Range of Motion     Left Elbow   Flexion: 130 degrees   Extension: -15 degrees   Forearm supination: 75 degrees   Forearm pronation: 75 degrees     Right Elbow   Flexion: 140 degrees   Extension: 0 degrees   Forearm supination: 80 degrees   Forearm pronation: 75 degrees     Strength/Myotome Testing     Left Elbow   Flexion: 4-  Extension: 4-  Forearm supination: 4  Forearm pronation: 4    Right Elbow   Flexion: 4+  Extension: 4+  Forearm supination: 4+  Forearm pronation: 4+    Additional Strength Details   right is 45#  And  31#    Swelling   Left Elbow Girth Measurements   10 cm below joint line: 33 cm    Right Elbow Girth Measurements   10 cm below joint line: 31 cm      Flowsheet Rows      Most Recent Value   PT/OT G-Codes   Current Score  51   Projected Score  66   Assessment Type  Evaluation   G code set  Carrying, Moving & Handling Objects   Carrying, Moving and Handling Objects Current Status ()  CJ   Carrying, Moving and Handling Objects Goal Status ()  CI             Precautions: HTN    Manuals 3/19                         MT left elbow 10                                      Neuro Re-Ed                                                                                                        Ther Ex             UBE 4 min            Wrist exs 2/20            gripper 30x            putty 20x                                                                Ther Activity                                       Gait Training                                       Modalities

## 2021-03-23 ENCOUNTER — PATIENT MESSAGE (OUTPATIENT)
Dept: FAMILY MEDICINE CLINIC | Facility: CLINIC | Age: 56
End: 2021-03-23

## 2021-03-23 ENCOUNTER — APPOINTMENT (OUTPATIENT)
Dept: PHYSICAL THERAPY | Age: 56
End: 2021-03-23
Payer: COMMERCIAL

## 2021-03-23 DIAGNOSIS — I10 ESSENTIAL HYPERTENSION: ICD-10-CM

## 2021-03-23 DIAGNOSIS — F51.01 PRIMARY INSOMNIA: ICD-10-CM

## 2021-03-23 DIAGNOSIS — F33.2 SEVERE EPISODE OF RECURRENT MAJOR DEPRESSIVE DISORDER, WITHOUT PSYCHOTIC FEATURES (HCC): ICD-10-CM

## 2021-03-23 RX ORDER — HYDROCHLOROTHIAZIDE 25 MG/1
25 TABLET ORAL DAILY
Qty: 90 TABLET | Refills: 1 | Status: SHIPPED | OUTPATIENT
Start: 2021-03-23 | End: 2021-07-08 | Stop reason: SDUPTHER

## 2021-03-23 RX ORDER — BUPROPION HYDROCHLORIDE 300 MG/1
300 TABLET ORAL DAILY
Qty: 90 TABLET | Refills: 1 | Status: SHIPPED | OUTPATIENT
Start: 2021-03-23 | End: 2021-11-15

## 2021-03-23 RX ORDER — ZOLPIDEM TARTRATE 5 MG/1
5 TABLET ORAL
Qty: 30 TABLET | Refills: 1 | Status: SHIPPED | OUTPATIENT
Start: 2021-03-23 | End: 2021-08-17 | Stop reason: SDUPTHER

## 2021-03-23 NOTE — PROGRESS NOTES
Daily Note     Today's date: 3/23/2021  Patient name: Tim January  : 1965  MRN: [id number]  Referring provider: Yady Polo PA-C  Dx:   Encounter Diagnosis     ICD-10-CM    1  Closed displaced fracture of head of left radius, initial encounter  S52 122A                   Subjective: ***      Objective: See treatment diary below      Assessment: Tolerated treatment {Tolerated treatment :[id number]}   Patient {assessment:}      Plan: {PLAN:[id number]}     Precautions: HTN    Manuals 3/19                         MT left elbow 10                                      Neuro Re-Ed                                                                                                        Ther Ex             UBE 4 min            Wrist exs             gripper 30x            putty 20x            bicep             tricep                                       Ther Activity                                       Gait Training                                       Modalities

## 2021-03-26 ENCOUNTER — OFFICE VISIT (OUTPATIENT)
Dept: PHYSICAL THERAPY | Age: 56
End: 2021-03-26
Payer: COMMERCIAL

## 2021-03-26 DIAGNOSIS — S52.122A CLOSED DISPLACED FRACTURE OF HEAD OF LEFT RADIUS, INITIAL ENCOUNTER: Primary | ICD-10-CM

## 2021-03-26 PROCEDURE — 97110 THERAPEUTIC EXERCISES: CPT | Performed by: PHYSICAL THERAPIST

## 2021-03-26 PROCEDURE — 97140 MANUAL THERAPY 1/> REGIONS: CPT | Performed by: PHYSICAL THERAPIST

## 2021-03-26 NOTE — PROGRESS NOTES
Daily Note     Today's date: 3/26/2021  Patient name: Enolia Bloch  : 1965  MRN: [id number]  Referring provider: Kirk Arana PA-C  Dx:   Encounter Diagnosis     ICD-10-CM    1  Closed displaced fracture of head of left radius, initial encounter  S52 122A        Start Time: 1600  Stop Time: 1700  Total time in clinic (min): 60 minutes    Subjective: Patient complains of elbow stiffness      Objective: See treatment diary below      Assessment: Tolerated treatment well  Patient demonstrated fatigue post treatment, exhibited good technique with therapeutic exercises and would benefit from continued PT, patient notes increased elbow extension post treatment      Plan: Progress treatment as tolerated         Precautions: HTN    Manuals 3/19 3/26                        MT left elbow 10 10                                     Neuro Re-Ed                                                                                                        Ther Ex             UBE 4 min 4 min           Wrist exs            gripper 30x 30x           putty 20x 30x           bicep  25/30           tricep  35/30                                     Ther Activity                                       Gait Training                                       Modalities

## 2021-03-29 ENCOUNTER — APPOINTMENT (OUTPATIENT)
Dept: PHYSICAL THERAPY | Age: 56
End: 2021-03-29
Payer: COMMERCIAL

## 2021-03-31 ENCOUNTER — APPOINTMENT (OUTPATIENT)
Dept: PHYSICAL THERAPY | Age: 56
End: 2021-03-31
Payer: COMMERCIAL

## 2021-04-25 DIAGNOSIS — I10 ESSENTIAL HYPERTENSION: Primary | ICD-10-CM

## 2021-04-26 RX ORDER — AMLODIPINE BESYLATE AND BENAZEPRIL HYDROCHLORIDE 10; 40 MG/1; MG/1
CAPSULE ORAL
Qty: 90 CAPSULE | Refills: 3 | Status: SHIPPED | OUTPATIENT
Start: 2021-04-26 | End: 2021-07-08 | Stop reason: HOSPADM

## 2021-07-08 ENCOUNTER — OFFICE VISIT (OUTPATIENT)
Dept: FAMILY MEDICINE CLINIC | Facility: CLINIC | Age: 56
End: 2021-07-08
Payer: COMMERCIAL

## 2021-07-08 VITALS
HEIGHT: 64 IN | HEART RATE: 83 BPM | BODY MASS INDEX: 49.85 KG/M2 | OXYGEN SATURATION: 99 % | TEMPERATURE: 97.5 F | WEIGHT: 292 LBS | DIASTOLIC BLOOD PRESSURE: 90 MMHG | SYSTOLIC BLOOD PRESSURE: 150 MMHG

## 2021-07-08 DIAGNOSIS — M43.16 SPONDYLOLISTHESIS AT L4-L5 LEVEL: Primary | ICD-10-CM

## 2021-07-08 DIAGNOSIS — I10 ESSENTIAL HYPERTENSION: ICD-10-CM

## 2021-07-08 DIAGNOSIS — E66.01 MORBID OBESITY (HCC): ICD-10-CM

## 2021-07-08 PROCEDURE — 1036F TOBACCO NON-USER: CPT | Performed by: NURSE PRACTITIONER

## 2021-07-08 PROCEDURE — 3077F SYST BP >= 140 MM HG: CPT | Performed by: NURSE PRACTITIONER

## 2021-07-08 PROCEDURE — 3008F BODY MASS INDEX DOCD: CPT | Performed by: NURSE PRACTITIONER

## 2021-07-08 PROCEDURE — 99214 OFFICE O/P EST MOD 30 MIN: CPT | Performed by: NURSE PRACTITIONER

## 2021-07-08 PROCEDURE — 3080F DIAST BP >= 90 MM HG: CPT | Performed by: NURSE PRACTITIONER

## 2021-07-08 RX ORDER — HYDROCHLOROTHIAZIDE 50 MG/1
50 TABLET ORAL DAILY
Qty: 60 TABLET | Refills: 0 | Status: SHIPPED | OUTPATIENT
Start: 2021-07-08 | End: 2021-07-26

## 2021-07-08 NOTE — PROGRESS NOTES
OFFICE VISIT  Arsh Nova 55 y o  female MRN: [id number]          Assessment / Plan:  Problem List Items Addressed This Visit        Cardiovascular and Mediastinum    Essential hypertension     Increase to 50mg of hctz  Continue monitoring blood pressure at home  Call office with readings if remain elevated  Has follow-up appointment 2 months             Relevant Medications    hydrochlorothiazide (HYDRODIURIL) 50 mg tablet       Musculoskeletal and Integument    Spondylolisthesis at L4-L5 level - Primary       Obtain imaging at this time  May need to reestablish with spine surgeon  May benefit from physical therapy if pain is persistent         Relevant Orders    XR spine lumbar minimum 4 views non injury       Other    Morbid obesity ([facility] [address] )      Obtain hemoglobin A1c  May complete prior already ordered blood work to assess fatigue  Relevant Orders    Hemoglobin A1C            Reason For Visit / Chief Complaint  Chief Complaint   Patient presents with    Back Pain     from fall back 6/16        HPI:  Arsh Nova is a 55 y o  female   Who presents today for with multiple complaints  Reports a fall, twice in June, she reports tripping know hx of L4-L5 fusion, 2019  Back pain since then, right side  Pain comes and goes  She described the pain as achy  She has tried motrin as needed with some relief  Pain is aggrevated with walking, sitting, standing  She has known HTN, was on Lotrel, was not working, now taking valsartan since January  160/92, 150/90  She is also taking hctz 25mg daily  She reports on occasional headaches  She reports weight gain, 20 pounds since march  She reports less activiely, eating more with increase depression       Historical Information   Past Medical History:   Diagnosis Date    Eye disease     EBMD    Hypertension     Psychiatric disorder     depression     Past Surgical History:   Procedure Laterality Date    BACK SURGERY      REPLACEMENT TOTAL KNEE Bilateral Social History   Social History     Substance and Sexual Activity   Alcohol Use Yes    Alcohol/week: 3 0 standard drinks    Types: 3 Glasses of wine per week     Social History     Substance and Sexual Activity   Drug Use Never     Social History     Tobacco Use   Smoking Status Never Smoker   Smokeless Tobacco Never Used     Family History   Problem Relation Age of Onset    No Known Problems Mother     No Known Problems Father        Meds/Allergies   Allergies   Allergen Reactions    Azithromycin Nausea Only     Category: sensitivity;     Erythromycin        Meds:    Current Outpatient Medications:     buPROPion (WELLBUTRIN XL) 300 mg 24 hr tablet, Take 1 tablet (300 mg total) by mouth daily, Disp: 90 tablet, Rfl: 1    calcium carbonate (OS-REI) 600 MG tablet, Take 600 mg by mouth 2 (two) times a day with meals, Disp: , Rfl:     hydrochlorothiazide (HYDRODIURIL) 50 mg tablet, Take 1 tablet (50 mg total) by mouth daily, Disp: 60 tablet, Rfl: 0    multivitamin (THERAGRAN) TABS, Take 1 tablet by mouth daily, Disp: , Rfl:     valsartan (DIOVAN) 320 MG tablet, Take 1 tablet (320 mg total) by mouth daily, Disp: 90 tablet, Rfl: 3    zolpidem (AMBIEN) 5 mg tablet, Take 1 tablet (5 mg total) by mouth daily at bedtime as needed for sleep, Disp: 30 tablet, Rfl: 1      REVIEW OF SYSTEMS  Review of Systems   Constitutional: Positive for unexpected weight change  Negative for activity change, chills, fatigue and fever  HENT: Negative for congestion, ear discharge, ear pain, sinus pressure, sinus pain, sore throat, tinnitus and trouble swallowing  Eyes: Negative for photophobia, pain, discharge, itching and visual disturbance  Respiratory: Negative for cough, chest tightness, shortness of breath and wheezing  Cardiovascular: Negative for chest pain and leg swelling  Gastrointestinal: Negative for abdominal distention, abdominal pain, constipation, diarrhea, nausea and vomiting     Endocrine: Negative for polydipsia, polyphagia and polyuria  Genitourinary: Negative for dysuria and frequency  Musculoskeletal: Positive for back pain  Negative for arthralgias, myalgias, neck pain and neck stiffness  Skin: Negative for color change  Neurological: Negative for dizziness, syncope, weakness, numbness and headaches  Hematological: Does not bruise/bleed easily  Psychiatric/Behavioral: Negative for behavioral problems, confusion, self-injury, sleep disturbance and suicidal ideas  The patient is not nervous/anxious  Current Vitals:   Blood Pressure: 150/90 (07/08/21 1047)  Pulse: 83 (07/08/21 1047)  Temperature: 97 5 °F (36 4 °C) (07/08/21 1047)  Height: 5' 4" (162 6 cm) (07/08/21 1047)  Weight - Scale: 132 kg (292 lb) (07/08/21 1047)  SpO2: 99 % (07/08/21 1047)  [unfilled]    PHYSICAL EXAMS:  Physical Exam  Constitutional:       Appearance: She is well-developed  She is obese  HENT:      Head: Normocephalic  Right Ear: Tympanic membrane, ear canal and external ear normal       Left Ear: Tympanic membrane, ear canal and external ear normal       Nose: Nose normal  No congestion or rhinorrhea  Mouth/Throat:      Mouth: Mucous membranes are moist       Pharynx: No oropharyngeal exudate or posterior oropharyngeal erythema  Eyes:      Extraocular Movements: Extraocular movements intact  Conjunctiva/sclera: Conjunctivae normal       Pupils: Pupils are equal, round, and reactive to light  Cardiovascular:      Rate and Rhythm: Normal rate and regular rhythm  Pulses: Normal pulses  Heart sounds: Normal heart sounds  Pulmonary:      Effort: Pulmonary effort is normal       Breath sounds: Normal breath sounds  No wheezing or rhonchi  Abdominal:      General: Bowel sounds are normal  There is no distension  Palpations: Abdomen is soft  Tenderness: There is no abdominal tenderness     Musculoskeletal:         General: No swelling, tenderness, deformity or signs of injury  Normal range of motion  Cervical back: Normal range of motion and neck supple  Right lower leg: No edema  Left lower leg: No edema  Skin:     General: Skin is warm and dry  Findings: No bruising, erythema, lesion or rash  Neurological:      General: No focal deficit present  Mental Status: She is alert and oriented to person, place, and time  Psychiatric:         Mood and Affect: Mood normal          Behavior: Behavior normal          Thought Content: Thought content normal          Judgment: Judgment normal              Lab, imaging and other studies: I have personally reviewed pertinent reports  Lori العراقي

## 2021-07-08 NOTE — ASSESSMENT & PLAN NOTE
Increase to 50mg of hctz  Continue monitoring blood pressure at home  Call office with readings if remain elevated  Has follow-up appointment 2 months  Dieudonne Cortes

## 2021-07-08 NOTE — ASSESSMENT & PLAN NOTE
Obtain imaging at this time  May need to reestablish with spine surgeon    May benefit from physical therapy if pain is persistent

## 2021-08-17 DIAGNOSIS — F51.01 PRIMARY INSOMNIA: ICD-10-CM

## 2021-08-17 RX ORDER — ZOLPIDEM TARTRATE 5 MG/1
5 TABLET ORAL
Qty: 30 TABLET | Refills: 0 | Status: SHIPPED | OUTPATIENT
Start: 2021-08-17 | End: 2021-09-17 | Stop reason: SDUPTHER

## 2021-08-26 ENCOUNTER — TELEPHONE (OUTPATIENT)
Dept: PSYCHIATRY | Facility: CLINIC | Age: 56
End: 2021-08-26

## 2021-09-10 ENCOUNTER — APPOINTMENT (OUTPATIENT)
Dept: LAB | Facility: CLINIC | Age: 56
End: 2021-09-10
Payer: COMMERCIAL

## 2021-09-10 DIAGNOSIS — Z12.31 VISIT FOR SCREENING MAMMOGRAM: ICD-10-CM

## 2021-09-10 DIAGNOSIS — I10 ESSENTIAL HYPERTENSION: Primary | ICD-10-CM

## 2021-09-10 DIAGNOSIS — Z12.11 SCREENING FOR COLON CANCER: ICD-10-CM

## 2021-09-10 DIAGNOSIS — F51.01 PRIMARY INSOMNIA: ICD-10-CM

## 2021-09-10 DIAGNOSIS — E66.01 MORBID OBESITY (HCC): ICD-10-CM

## 2021-09-10 DIAGNOSIS — I10 ESSENTIAL HYPERTENSION: ICD-10-CM

## 2021-09-10 DIAGNOSIS — F32.A DEPRESSION, UNSPECIFIED DEPRESSION TYPE: ICD-10-CM

## 2021-09-10 LAB
ALBUMIN SERPL BCP-MCNC: 3.6 G/DL (ref 3.5–5)
ALP SERPL-CCNC: 106 U/L (ref 46–116)
ALT SERPL W P-5'-P-CCNC: 28 U/L (ref 12–78)
ANION GAP SERPL CALCULATED.3IONS-SCNC: 7 MMOL/L (ref 4–13)
AST SERPL W P-5'-P-CCNC: 24 U/L (ref 5–45)
BASOPHILS # BLD AUTO: 0.03 THOUSANDS/ΜL (ref 0–0.1)
BASOPHILS NFR BLD AUTO: 1 % (ref 0–1)
BILIRUB SERPL-MCNC: 0.9 MG/DL (ref 0.2–1)
BUN SERPL-MCNC: 6 MG/DL (ref 5–25)
CALCIUM SERPL-MCNC: 9.4 MG/DL (ref 8.3–10.1)
CHLORIDE SERPL-SCNC: 99 MMOL/L (ref 100–108)
CHOLEST SERPL-MCNC: 164 MG/DL (ref 50–200)
CO2 SERPL-SCNC: 26 MMOL/L (ref 21–32)
CREAT SERPL-MCNC: 0.64 MG/DL (ref 0.6–1.3)
EOSINOPHIL # BLD AUTO: 0.1 THOUSAND/ΜL (ref 0–0.61)
EOSINOPHIL NFR BLD AUTO: 3 % (ref 0–6)
ERYTHROCYTE [DISTWIDTH] IN BLOOD BY AUTOMATED COUNT: 12.4 % (ref 11.6–15.1)
EST. AVERAGE GLUCOSE BLD GHB EST-MCNC: 94 MG/DL
GFR SERPL CREATININE-BSD FRML MDRD: 101 ML/MIN/1.73SQ M
GLUCOSE P FAST SERPL-MCNC: 84 MG/DL (ref 65–99)
HBA1C MFR BLD: 4.9 %
HCT VFR BLD AUTO: 40.4 % (ref 34.8–46.1)
HDLC SERPL-MCNC: 80 MG/DL
HGB BLD-MCNC: 13.5 G/DL (ref 11.5–15.4)
IMM GRANULOCYTES # BLD AUTO: 0.01 THOUSAND/UL (ref 0–0.2)
IMM GRANULOCYTES NFR BLD AUTO: 0 % (ref 0–2)
LDLC SERPL CALC-MCNC: 71 MG/DL (ref 0–100)
LYMPHOCYTES # BLD AUTO: 0.91 THOUSANDS/ΜL (ref 0.6–4.47)
LYMPHOCYTES NFR BLD AUTO: 27 % (ref 14–44)
MCH RBC QN AUTO: 30.8 PG (ref 26.8–34.3)
MCHC RBC AUTO-ENTMCNC: 33.4 G/DL (ref 31.4–37.4)
MCV RBC AUTO: 92 FL (ref 82–98)
MONOCYTES # BLD AUTO: 0.49 THOUSAND/ΜL (ref 0.17–1.22)
MONOCYTES NFR BLD AUTO: 15 % (ref 4–12)
NEUTROPHILS # BLD AUTO: 1.84 THOUSANDS/ΜL (ref 1.85–7.62)
NEUTS SEG NFR BLD AUTO: 54 % (ref 43–75)
NONHDLC SERPL-MCNC: 84 MG/DL
NRBC BLD AUTO-RTO: 0 /100 WBCS
PLATELET # BLD AUTO: 264 THOUSANDS/UL (ref 149–390)
PMV BLD AUTO: 11.6 FL (ref 8.9–12.7)
POTASSIUM SERPL-SCNC: 3.8 MMOL/L (ref 3.5–5.3)
PROT SERPL-MCNC: 7 G/DL (ref 6.4–8.2)
RBC # BLD AUTO: 4.38 MILLION/UL (ref 3.81–5.12)
SODIUM SERPL-SCNC: 132 MMOL/L (ref 136–145)
T4 FREE SERPL-MCNC: 1.46 NG/DL (ref 0.76–1.46)
TRIGL SERPL-MCNC: 63 MG/DL
TSH SERPL DL<=0.05 MIU/L-ACNC: 1.81 UIU/ML (ref 0.36–3.74)
WBC # BLD AUTO: 3.38 THOUSAND/UL (ref 4.31–10.16)

## 2021-09-10 PROCEDURE — 83036 HEMOGLOBIN GLYCOSYLATED A1C: CPT

## 2021-09-10 PROCEDURE — 36415 COLL VENOUS BLD VENIPUNCTURE: CPT

## 2021-09-10 PROCEDURE — 85025 COMPLETE CBC W/AUTO DIFF WBC: CPT

## 2021-09-10 PROCEDURE — 84439 ASSAY OF FREE THYROXINE: CPT

## 2021-09-10 PROCEDURE — 80061 LIPID PANEL: CPT

## 2021-09-10 PROCEDURE — 84443 ASSAY THYROID STIM HORMONE: CPT

## 2021-09-10 PROCEDURE — 80053 COMPREHEN METABOLIC PANEL: CPT

## 2021-09-10 RX ORDER — HYDROCHLOROTHIAZIDE 25 MG/1
TABLET ORAL
Qty: 90 TABLET | Refills: 1 | Status: SHIPPED | OUTPATIENT
Start: 2021-09-10 | End: 2022-02-15 | Stop reason: HOSPADM

## 2021-09-13 ENCOUNTER — TELEPHONE (OUTPATIENT)
Dept: FAMILY MEDICINE CLINIC | Facility: CLINIC | Age: 56
End: 2021-09-13

## 2021-09-13 DIAGNOSIS — D72.819 LEUKOPENIA, UNSPECIFIED TYPE: ICD-10-CM

## 2021-09-13 DIAGNOSIS — I10 ESSENTIAL HYPERTENSION: Primary | ICD-10-CM

## 2021-09-13 NOTE — TELEPHONE ENCOUNTER
Patient calling back sent my chart message and wanted to know if you can order her the C-Reactive Protein (CRP) test since her WBC was low      Wanted to have test done before her appointment with you on 10/8/21

## 2021-09-17 DIAGNOSIS — F51.01 PRIMARY INSOMNIA: ICD-10-CM

## 2021-09-17 RX ORDER — ZOLPIDEM TARTRATE 5 MG/1
5 TABLET ORAL
Qty: 30 TABLET | Refills: 0 | Status: SHIPPED | OUTPATIENT
Start: 2021-09-17 | End: 2022-02-10

## 2021-10-22 ENCOUNTER — APPOINTMENT (OUTPATIENT)
Dept: LAB | Facility: CLINIC | Age: 56
End: 2021-10-22
Payer: COMMERCIAL

## 2021-10-22 DIAGNOSIS — D72.819 LEUKOPENIA, UNSPECIFIED TYPE: ICD-10-CM

## 2021-10-22 DIAGNOSIS — I10 ESSENTIAL HYPERTENSION: ICD-10-CM

## 2021-10-22 LAB
BASOPHILS # BLD AUTO: 0.03 THOUSANDS/ΜL (ref 0–0.1)
BASOPHILS NFR BLD AUTO: 1 % (ref 0–1)
CRP SERPL QL: <3 MG/L
EOSINOPHIL # BLD AUTO: 0.2 THOUSAND/ΜL (ref 0–0.61)
EOSINOPHIL NFR BLD AUTO: 5 % (ref 0–6)
ERYTHROCYTE [DISTWIDTH] IN BLOOD BY AUTOMATED COUNT: 12.6 % (ref 11.6–15.1)
HCT VFR BLD AUTO: 43.1 % (ref 34.8–46.1)
HGB BLD-MCNC: 14.3 G/DL (ref 11.5–15.4)
IMM GRANULOCYTES # BLD AUTO: 0.01 THOUSAND/UL (ref 0–0.2)
IMM GRANULOCYTES NFR BLD AUTO: 0 % (ref 0–2)
LYMPHOCYTES # BLD AUTO: 1.01 THOUSANDS/ΜL (ref 0.6–4.47)
LYMPHOCYTES NFR BLD AUTO: 27 % (ref 14–44)
MCH RBC QN AUTO: 31 PG (ref 26.8–34.3)
MCHC RBC AUTO-ENTMCNC: 33.2 G/DL (ref 31.4–37.4)
MCV RBC AUTO: 94 FL (ref 82–98)
MONOCYTES # BLD AUTO: 0.43 THOUSAND/ΜL (ref 0.17–1.22)
MONOCYTES NFR BLD AUTO: 11 % (ref 4–12)
NEUTROPHILS # BLD AUTO: 2.08 THOUSANDS/ΜL (ref 1.85–7.62)
NEUTS SEG NFR BLD AUTO: 56 % (ref 43–75)
NRBC BLD AUTO-RTO: 0 /100 WBCS
PLATELET # BLD AUTO: 305 THOUSANDS/UL (ref 149–390)
PMV BLD AUTO: 11.7 FL (ref 8.9–12.7)
RBC # BLD AUTO: 4.61 MILLION/UL (ref 3.81–5.12)
WBC # BLD AUTO: 3.76 THOUSAND/UL (ref 4.31–10.16)

## 2021-10-22 PROCEDURE — 86140 C-REACTIVE PROTEIN: CPT

## 2021-10-22 PROCEDURE — 85025 COMPLETE CBC W/AUTO DIFF WBC: CPT

## 2021-10-22 PROCEDURE — 36415 COLL VENOUS BLD VENIPUNCTURE: CPT

## 2021-10-27 ENCOUNTER — TELEPHONE (OUTPATIENT)
Dept: FAMILY MEDICINE CLINIC | Facility: CLINIC | Age: 56
End: 2021-10-27

## 2021-11-01 ENCOUNTER — OFFICE VISIT (OUTPATIENT)
Dept: URGENT CARE | Facility: CLINIC | Age: 56
End: 2021-11-01
Payer: COMMERCIAL

## 2021-11-01 VITALS
WEIGHT: 284 LBS | HEART RATE: 92 BPM | BODY MASS INDEX: 48.75 KG/M2 | TEMPERATURE: 98.3 F | RESPIRATION RATE: 18 BRPM | OXYGEN SATURATION: 98 %

## 2021-11-01 DIAGNOSIS — J01.00 ACUTE NON-RECURRENT MAXILLARY SINUSITIS: Primary | ICD-10-CM

## 2021-11-01 DIAGNOSIS — R05.9 COUGH: ICD-10-CM

## 2021-11-01 PROCEDURE — 0241U HB NFCT DS VIR RESP RNA 4 TRGT: CPT | Performed by: PREVENTIVE MEDICINE

## 2021-11-01 PROCEDURE — 99213 OFFICE O/P EST LOW 20 MIN: CPT | Performed by: PREVENTIVE MEDICINE

## 2021-11-01 RX ORDER — AMOXICILLIN 500 MG/1
500 CAPSULE ORAL EVERY 8 HOURS SCHEDULED
Qty: 21 CAPSULE | Refills: 0 | Status: SHIPPED | OUTPATIENT
Start: 2021-11-01 | End: 2021-11-08

## 2021-11-02 LAB
FLUAV RNA RESP QL NAA+PROBE: NEGATIVE
FLUBV RNA RESP QL NAA+PROBE: NEGATIVE
RSV RNA RESP QL NAA+PROBE: NEGATIVE
SARS-COV-2 RNA RESP QL NAA+PROBE: POSITIVE

## 2021-11-03 ENCOUNTER — TELEMEDICINE (OUTPATIENT)
Dept: FAMILY MEDICINE CLINIC | Facility: CLINIC | Age: 56
End: 2021-11-03
Payer: COMMERCIAL

## 2021-11-03 ENCOUNTER — TELEPHONE (OUTPATIENT)
Dept: URGENT CARE | Age: 56
End: 2021-11-03

## 2021-11-03 VITALS — OXYGEN SATURATION: 97 % | TEMPERATURE: 101.9 F

## 2021-11-03 DIAGNOSIS — U07.1 COVID-19 VIRUS INFECTION: Primary | ICD-10-CM

## 2021-11-03 PROCEDURE — 99213 OFFICE O/P EST LOW 20 MIN: CPT | Performed by: NURSE PRACTITIONER

## 2021-11-05 ENCOUNTER — TELEMEDICINE (OUTPATIENT)
Dept: FAMILY MEDICINE CLINIC | Facility: CLINIC | Age: 56
End: 2021-11-05
Payer: COMMERCIAL

## 2021-11-05 VITALS — OXYGEN SATURATION: 97 % | HEART RATE: 90 BPM | TEMPERATURE: 98.9 F

## 2021-11-05 DIAGNOSIS — U07.1 COVID-19 VIRUS INFECTION: Primary | ICD-10-CM

## 2021-11-05 PROCEDURE — 99213 OFFICE O/P EST LOW 20 MIN: CPT | Performed by: FAMILY MEDICINE

## 2021-11-05 RX ORDER — DEXTROMETHORPHAN HYDROBROMIDE AND PROMETHAZINE HYDROCHLORIDE 15; 6.25 MG/5ML; MG/5ML
10 SOLUTION ORAL 3 TIMES DAILY PRN
Qty: 240 ML | Refills: 1 | Status: SHIPPED | OUTPATIENT
Start: 2021-11-05 | End: 2022-02-15 | Stop reason: HOSPADM

## 2021-11-08 ENCOUNTER — TELEMEDICINE (OUTPATIENT)
Dept: FAMILY MEDICINE CLINIC | Facility: CLINIC | Age: 56
End: 2021-11-08
Payer: COMMERCIAL

## 2021-11-08 VITALS — OXYGEN SATURATION: 94 % | HEART RATE: 82 BPM

## 2021-11-08 DIAGNOSIS — U07.1 COVID-19 VIRUS INFECTION: Primary | ICD-10-CM

## 2021-11-08 PROCEDURE — 99213 OFFICE O/P EST LOW 20 MIN: CPT | Performed by: FAMILY MEDICINE

## 2021-11-08 RX ORDER — DEXTROMETHORPHAN HYDROBROMIDE AND PROMETHAZINE HYDROCHLORIDE 15; 6.25 MG/5ML; MG/5ML
10 SOLUTION ORAL 3 TIMES DAILY PRN
Qty: 240 ML | Refills: 1 | Status: SHIPPED | OUTPATIENT
Start: 2021-11-08 | End: 2022-02-15 | Stop reason: HOSPADM

## 2021-11-09 DIAGNOSIS — U07.1 COVID-19 VIRUS INFECTION: Primary | ICD-10-CM

## 2021-11-09 RX ORDER — ONDANSETRON 4 MG/1
4 TABLET, FILM COATED ORAL EVERY 8 HOURS PRN
Qty: 20 TABLET | Refills: 0 | Status: SHIPPED | OUTPATIENT
Start: 2021-11-09 | End: 2022-02-15 | Stop reason: HOSPADM

## 2021-11-10 ENCOUNTER — TELEMEDICINE (OUTPATIENT)
Dept: FAMILY MEDICINE CLINIC | Facility: CLINIC | Age: 56
End: 2021-11-10
Payer: COMMERCIAL

## 2021-11-10 VITALS — HEART RATE: 80 BPM | OXYGEN SATURATION: 98 %

## 2021-11-10 DIAGNOSIS — U07.1 COVID-19 VIRUS INFECTION: Primary | ICD-10-CM

## 2021-11-10 PROCEDURE — 99213 OFFICE O/P EST LOW 20 MIN: CPT | Performed by: FAMILY MEDICINE

## 2021-11-15 ENCOUNTER — TELEMEDICINE (OUTPATIENT)
Dept: FAMILY MEDICINE CLINIC | Facility: CLINIC | Age: 56
End: 2021-11-15
Payer: COMMERCIAL

## 2021-11-15 DIAGNOSIS — F33.2 SEVERE EPISODE OF RECURRENT MAJOR DEPRESSIVE DISORDER, WITHOUT PSYCHOTIC FEATURES (HCC): ICD-10-CM

## 2021-11-15 DIAGNOSIS — U07.1 COVID-19 VIRUS INFECTION: Primary | ICD-10-CM

## 2021-11-15 PROCEDURE — 1036F TOBACCO NON-USER: CPT | Performed by: NURSE PRACTITIONER

## 2021-11-15 PROCEDURE — 99213 OFFICE O/P EST LOW 20 MIN: CPT | Performed by: NURSE PRACTITIONER

## 2021-11-15 RX ORDER — BUPROPION HYDROCHLORIDE 300 MG/1
TABLET ORAL
Qty: 90 TABLET | Refills: 1 | Status: SHIPPED | OUTPATIENT
Start: 2021-11-15 | End: 2022-05-16

## 2022-01-20 DIAGNOSIS — M25.512 BILATERAL SHOULDER PAIN, UNSPECIFIED CHRONICITY: Primary | ICD-10-CM

## 2022-01-20 DIAGNOSIS — M25.511 BILATERAL SHOULDER PAIN, UNSPECIFIED CHRONICITY: Primary | ICD-10-CM

## 2022-01-24 ENCOUNTER — APPOINTMENT (OUTPATIENT)
Dept: RADIOLOGY | Facility: CLINIC | Age: 57
End: 2022-01-24
Payer: COMMERCIAL

## 2022-01-24 DIAGNOSIS — M25.512 BILATERAL SHOULDER PAIN, UNSPECIFIED CHRONICITY: ICD-10-CM

## 2022-01-24 DIAGNOSIS — M43.16 SPONDYLOLISTHESIS AT L4-L5 LEVEL: ICD-10-CM

## 2022-01-24 DIAGNOSIS — M25.511 BILATERAL SHOULDER PAIN, UNSPECIFIED CHRONICITY: ICD-10-CM

## 2022-01-24 PROCEDURE — 72110 X-RAY EXAM L-2 SPINE 4/>VWS: CPT

## 2022-01-24 PROCEDURE — 73030 X-RAY EXAM OF SHOULDER: CPT

## 2022-01-25 ENCOUNTER — OFFICE VISIT (OUTPATIENT)
Dept: OBGYN CLINIC | Facility: CLINIC | Age: 57
End: 2022-01-25
Payer: COMMERCIAL

## 2022-01-25 VITALS
SYSTOLIC BLOOD PRESSURE: 160 MMHG | HEIGHT: 64 IN | HEART RATE: 103 BPM | DIASTOLIC BLOOD PRESSURE: 88 MMHG | BODY MASS INDEX: 48.75 KG/M2

## 2022-01-25 DIAGNOSIS — M25.512 BILATERAL SHOULDER PAIN, UNSPECIFIED CHRONICITY: Primary | ICD-10-CM

## 2022-01-25 DIAGNOSIS — M25.511 BILATERAL SHOULDER PAIN, UNSPECIFIED CHRONICITY: Primary | ICD-10-CM

## 2022-01-25 DIAGNOSIS — M19.012 PRIMARY OSTEOARTHRITIS OF LEFT SHOULDER: ICD-10-CM

## 2022-01-25 DIAGNOSIS — M12.811 RIGHT ROTATOR CUFF TEAR ARTHROPATHY: ICD-10-CM

## 2022-01-25 DIAGNOSIS — M75.82 ROTATOR CUFF TENDONITIS, LEFT: ICD-10-CM

## 2022-01-25 DIAGNOSIS — M19.011 PRIMARY OSTEOARTHRITIS OF RIGHT SHOULDER: ICD-10-CM

## 2022-01-25 DIAGNOSIS — M75.40 SUBACROMIAL IMPINGEMENT, UNSPECIFIED LATERALITY: ICD-10-CM

## 2022-01-25 DIAGNOSIS — M75.101 RIGHT ROTATOR CUFF TEAR ARTHROPATHY: ICD-10-CM

## 2022-01-25 PROCEDURE — 99214 OFFICE O/P EST MOD 30 MIN: CPT | Performed by: PHYSICIAN ASSISTANT

## 2022-01-25 PROCEDURE — 1036F TOBACCO NON-USER: CPT | Performed by: PHYSICIAN ASSISTANT

## 2022-01-25 PROCEDURE — 3079F DIAST BP 80-89 MM HG: CPT | Performed by: PHYSICIAN ASSISTANT

## 2022-01-25 PROCEDURE — 3077F SYST BP >= 140 MM HG: CPT | Performed by: PHYSICIAN ASSISTANT

## 2022-01-25 PROCEDURE — 20610 DRAIN/INJ JOINT/BURSA W/O US: CPT | Performed by: PHYSICIAN ASSISTANT

## 2022-01-25 RX ORDER — BUPIVACAINE HYDROCHLORIDE 2.5 MG/ML
2 INJECTION, SOLUTION INFILTRATION; PERINEURAL
Status: COMPLETED | OUTPATIENT
Start: 2022-01-25 | End: 2022-01-25

## 2022-01-25 RX ORDER — LIDOCAINE HYDROCHLORIDE 10 MG/ML
2 INJECTION, SOLUTION INFILTRATION; PERINEURAL
Status: COMPLETED | OUTPATIENT
Start: 2022-01-25 | End: 2022-01-25

## 2022-01-25 RX ORDER — METHYLPREDNISOLONE ACETATE 40 MG/ML
2 INJECTION, SUSPENSION INTRA-ARTICULAR; INTRALESIONAL; INTRAMUSCULAR; SOFT TISSUE
Status: COMPLETED | OUTPATIENT
Start: 2022-01-25 | End: 2022-01-25

## 2022-01-25 RX ADMIN — LIDOCAINE HYDROCHLORIDE 2 ML: 10 INJECTION, SOLUTION INFILTRATION; PERINEURAL at 09:44

## 2022-01-25 RX ADMIN — METHYLPREDNISOLONE ACETATE 2 ML: 40 INJECTION, SUSPENSION INTRA-ARTICULAR; INTRALESIONAL; INTRAMUSCULAR; SOFT TISSUE at 09:44

## 2022-01-25 RX ADMIN — BUPIVACAINE HYDROCHLORIDE 2 ML: 2.5 INJECTION, SOLUTION INFILTRATION; PERINEURAL at 09:44

## 2022-01-25 NOTE — PROGRESS NOTES
Patient Name:  Jf Cool  MRN:  [id number]    Assessment & Plan     1  Bilateral shoulder pain, unspecified chronicity    2  Primary osteoarthritis of right shoulder  -     Ambulatory Referral to Physical Therapy; Future  -     Large joint arthrocentesis: bilateral subacromial bursa    3  Primary osteoarthritis of left shoulder  -     Ambulatory Referral to Physical Therapy; Future  -     Large joint arthrocentesis: bilateral subacromial bursa    4  Right rotator cuff tear arthropathy  -     Ambulatory Referral to Physical Therapy; Future  -     Large joint arthrocentesis: bilateral subacromial bursa    5  Rotator cuff tendonitis, left  -     Large joint arthrocentesis: bilateral subacromial bursa    6  Subacromial impingement, unspecified laterality        55year old female with bilateral shoulder early osteoarthritis and likely rotator cuff pathology  X-rays reviewed in office today with patient  At this time, provided patient with options for continued conservative treatment of bilateral shoulders including outpatient PT, bilateral shoulder corticosteroid injections, MRI of bilateral shoulders to rule out rotator cuff pathology  At this time, patient would like to move forward with outpatient PT and corticosteroid injections  Risks and benefits of corticosteroid injection were discussed in detail  Risks including:  Post injection pain, elevation in blood sugar, skin discoloration, fatty atrophy, and infection were discussed in detail  The patient understood and elected to proceed forward  After sterile preparation the bilateral subacromial bursa were injected with 2 cc of 1% lidocaine, 2 cc of 0 25% bupivacaine, and 1cc of Depo-Medrol  The patient tolerated the procedure and no immediate complications were noted    The patient was instructed to ice and elevate the injection site, limit strenuous activity for the next 24-48 hours, and contact us if there were any questions or concerns prior to their follow-up appointment  Placed order for outpatient PT to work on proper scapular and shoulder mechanics, strengthening of rotator cuff, periscapular, postural musculature, increasing range of motion  I will see the patient back in 6/8 weeks for reevaluation of bilateral shoulders with possible MRI at that time if indicated  Chief Complaint     Bilateral shoulder pain    History of the Present Illness     Jeana Hayden is a RHD 55 y o  female with bilateral shoulder pain ongoing for many years, yet worsening over the past 1-2 months  Patient admits to history of mechanical falls and Left elbow fracture last year  Patient reports continuous pain of Right > Left shoulder with overhead motion, lifting, and sleeping  She locates Right shoulder pain to clavicle, anterior chest, and lateral aspect of shoulder  She reports she has osteoarthritis everywhere and believes this is also pain from OA  She occasionally administers OTC oral analgesics without pain relief and denies history of corticosteroid injections  Review of Systems     Review of Systems   Constitutional: Negative for chills and fever  HENT: Negative for ear pain and sore throat  Eyes: Negative for pain and visual disturbance  Respiratory: Negative for cough and shortness of breath  Cardiovascular: Negative for chest pain and palpitations  Gastrointestinal: Negative for abdominal pain and vomiting  Genitourinary: Negative for dysuria and hematuria  Musculoskeletal: Negative for arthralgias and back pain  Skin: Negative for color change and rash  Neurological: Negative for seizures and syncope  All other systems reviewed and are negative  Physical Exam     /88   Pulse 103   Ht 5' 4" (1 626 m)   BMI 48 75 kg/m²     Right Shoulder:    Active range of motion   140 degrees forward flexion with pain and crepitus  140 degrees abduction with pain and crepitus  50 degrees external rotation   Low thoracic internal rotation Passive range of motion   140-150 degrees of forward flexion with pain and crepitus   There is tenderness present over the clavicle, pectoralis minor, greater tuberosity of humerus, upper trapezius  There is 4/5 strength with external rotation testing at the side  Empty can testing elicits moderate weakness with mild pain  Belly press test elicits mild discomfort and weakness   Hernández test is negative   Beaver's test is negative    Speed's test is Negative  The patient is neurovascularly intact distally in the extremity  Left Shoulder: Active range of motion   140 degrees forward flexion  140 degrees abduction  60 degrees external rotation   Low thoracic  internal rotation      Passive range of motion   140-150 degrees of forward flexion with firm end point  There is tenderness present over the pectoralis minor at coracoid, greater tuberosity of humerus, upper traezius  There is 4+/5 strength with external rotation testing at the side  Empty can testing elicits weakness and mild pain  Belly press test elicits mild weakness  Hernádnez test is negative   Beaver's test is negative    Speed's test is Negative  The patient is neurovascularly intact distally in the extremity  Eyes:  Anicteric sclerae  Neck:  Supple  Lungs:  Normal respiratory effort  Cardiovascular:  Capillary refill is less than 2 seconds  Skin:  Intact without erythema  Neurologic:  Sensation grossly intact to light touch  Psychiatric:  Mood and affect are appropriate  Data Review     I have personally reviewed pertinent films in PACS, and my interpretation follows:    X-rays taken today 01/25/2021 of Right shoulder demonstrates mild glenohumeral degenerative changes with subacromial spur noted  No acute fracture or dislocation  Left shoulder demonstrates early glenohumeral degenerative changes with subacromial spur         Past Medical History:   Diagnosis Date    Eye disease     EBMD    Hypertension     Psychiatric disorder     depression       Past Surgical History:   Procedure Laterality Date    BACK SURGERY      REPLACEMENT TOTAL KNEE Bilateral        Allergies   Allergen Reactions    Azithromycin Nausea Only     Category: sensitivity;     Erythromycin        Current Outpatient Medications on File Prior to Visit   Medication Sig Dispense Refill    buPROPion (WELLBUTRIN XL) 300 mg 24 hr tablet TAKE 1 TABLET BY MOUTH EVERY DAY 90 tablet 1    calcium carbonate (OS-REI) 600 MG tablet Take 600 mg by mouth 2 (two) times a day with meals      hydrochlorothiazide (HYDRODIURIL) 50 mg tablet TAKE 1 TABLET BY MOUTH EVERY DAY 90 tablet 1    multivitamin (THERAGRAN) TABS Take 1 tablet by mouth daily      ondansetron (ZOFRAN) 4 mg tablet Take 1 tablet (4 mg total) by mouth every 8 (eight) hours as needed for nausea or vomiting 20 tablet 0    valsartan (DIOVAN) 320 MG tablet Take 1 tablet (320 mg total) by mouth daily 90 tablet 3    zolpidem (AMBIEN) 5 mg tablet Take 1 tablet (5 mg total) by mouth daily at bedtime as needed for sleep 30 tablet 0    fluticasone-vilanterol (Breo Ellipta) 200-25 MCG/INH inhaler Inhale 1 puff daily Rinse mouth after use  60 blister 0    hydrochlorothiazide (HYDRODIURIL) 25 mg tablet TAKE 1 TABLET BY MOUTH EVERY DAY (Patient not taking: Reported on 1/25/2022) 90 tablet 1    Promethazine-DM (PHENERGAN-DM) 6 25-15 mg/5 mL oral syrup Take 10 mL by mouth 3 (three) times a day as needed for cough (Patient not taking: Reported on 1/25/2022 ) 240 mL 1    Promethazine-DM (PHENERGAN-DM) 6 25-15 mg/5 mL oral syrup Take 10 mL by mouth 3 (three) times a day as needed for cough (Patient not taking: Reported on 1/25/2022 ) 240 mL 1     No current facility-administered medications on file prior to visit  Social History     Tobacco Use    Smoking status: Never Smoker    Smokeless tobacco: Never Used   Vaping Use    Vaping Use: Never used   Substance Use Topics    Alcohol use:  Yes Alcohol/week: 3 0 standard drinks     Types: 3 Glasses of wine per week    Drug use: Never       Family History   Problem Relation Age of Onset    No Known Problems Mother     No Known Problems Father              Procedures Performed     Large joint arthrocentesis: bilateral subacromial bursa  Universal Protocol:  Consent given by: patient  Patient identity confirmed: verbally with patient    Supporting Documentation  Indications: pain   Procedure Details  Location: shoulder - bilateral subacromial bursa  Needle size: 22 G  Approach: lateral    Medications (Right): 2 mL bupivacaine 0 25 %; 2 mL lidocaine 1 %; 2 mL methylPREDNISolone acetate 40 mg/mLMedications (Left): 2 mL bupivacaine 0 25 %; 2 mL lidocaine 1 %; 2 mL methylPREDNISolone acetate 40 mg/mL   Patient tolerance: patient tolerated the procedure well with no immediate complications  Dressing:  Sterile dressing applied              Iggy Kamara DO

## 2022-01-26 NOTE — RESULT ENCOUNTER NOTE
Lumbar xray reveals degenerative changes, this was ordered 7 2021 from a visit related to a fall    She is currently under the care ortho for shoulder pain, she can also attend PT if needed or contact her spinal surgeon if remains bothersome

## 2022-01-28 DIAGNOSIS — M13.0 POLYARTHROPATHY: ICD-10-CM

## 2022-01-28 DIAGNOSIS — M43.16 SPONDYLOLISTHESIS AT L4-L5 LEVEL: Primary | ICD-10-CM

## 2022-02-07 ENCOUNTER — EVALUATION (OUTPATIENT)
Dept: PHYSICAL THERAPY | Facility: CLINIC | Age: 57
End: 2022-02-07
Payer: COMMERCIAL

## 2022-02-07 DIAGNOSIS — M19.011 PRIMARY OSTEOARTHRITIS OF RIGHT SHOULDER: Primary | ICD-10-CM

## 2022-02-07 DIAGNOSIS — M12.811 RIGHT ROTATOR CUFF TEAR ARTHROPATHY: ICD-10-CM

## 2022-02-07 DIAGNOSIS — M75.101 RIGHT ROTATOR CUFF TEAR ARTHROPATHY: ICD-10-CM

## 2022-02-07 DIAGNOSIS — M19.012 PRIMARY OSTEOARTHRITIS OF LEFT SHOULDER: ICD-10-CM

## 2022-02-07 PROCEDURE — 97162 PT EVAL MOD COMPLEX 30 MIN: CPT | Performed by: PHYSICAL THERAPIST

## 2022-02-07 NOTE — PROGRESS NOTES
PT Evaluation     Today's date: 2022  Patient name: Orville Venegas  : 1965  MRN: [id number]  Referring provider: Lanie Bautista PA-C  Dx:   Encounter Diagnosis     ICD-10-CM    1  Primary osteoarthritis of right shoulder  M19 011 Ambulatory Referral to Physical Therapy   2  Primary osteoarthritis of left shoulder  M19 012 Ambulatory Referral to Physical Therapy   3  Right rotator cuff tear arthropathy  M75 [id number] Ambulatory Referral to Physical Therapy    M12 811        Start Time: 1750  Stop Time:   Total time in clinic (min): 65 minutes    Assessment  Assessment details: Orville Venegas is a 55 y o  female who presents with pain, decreased strength, decreased ROM, decreased joint mobility and postural  dysfunction  Due to these impairments, Patient has difficulty performing a/iadls, recreational activities and engaging in social activities  Patient's clinical presentation is consistent with their referring diagnosis of bilateral impingement syndrome with possible underlying RC pathology  Patient would benefit from skilled physical therapy to address their aforementioned impairments, improve their level of function and to improve their overall quality of life  Impairments: abnormal or restricted ROM, activity intolerance, impaired physical strength, lacks appropriate home exercise program, pain with function and poor posture   Understanding of Dx/Px/POC: excellent   Prognosis: good    Goals  Short Term Goals: to be achieved by 4 weeks  1) Patient to be independent with basic HEP  2) Decrease pain to 5/10 at it's worst   3) Increase UE strength by 1/2 MMT grade in all deficient planes  4) Increase UE ROM by > 5 deg in all deficient planes  5) Patient to report decreased sleep interruption secondary to pain  Long Term Goals: to be achieved by discharge  1) FOTO equal to or greater than TBD  2) Patient to be independent with comprehensive HEP  3) Abolish pain for improved quality of life    4) Increase UE strength to 5/5 MMT grade in all deficient planes to improve a/iadls  5) Increase UE ROM to within 5 deg of contralateral UE to improve a/iadls  6) Patient to report no sleep interruption secondary to pain  Plan  Patient would benefit from: skilled PT  Planned modality interventions: biofeedback, cryotherapy, hydrotherapy, unattended electrical stimulation, thermotherapy: hydrocollator packs and low level laser therapy  Planned therapy interventions: activity modification, ADL retraining, behavior modification, body mechanics training, functional ROM exercises, home exercise program, IADL retraining, joint mobilization, manual therapy, massage, neuromuscular re-education, patient education, postural training, strengthening, stretching, therapeutic activities and therapeutic exercise  Frequency: 1-2x week  Duration in weeks: 12  Plan of Care beginning date: 2/7/2022  Plan of Care expiration date: 5/2/2022  Treatment plan discussed with: patient        Subjective Evaluation    History of Present Illness  Mechanism of injury: Patient presents with h/o chronic b/l shoulder pain (R > L)  Patient states that her pain began 2 years ago after her lumbar spine surgery  Patient has significant weakness reaching/lifting overhead  Patient has generalized deconditioning d/t inactivity following her lumbar surgery  Patient has (+) crepitus in her shoulders bilaterally  Patient has chronic tingling in her hands  Patient had bilateral steroid injections to her shoulders without relief  Patient believes that she had tore her biceps in her left shoulder in her 20's  Per chart review, see below for diagnostic imaging of her shoulders:     XR R shoulder: "Glenohumeral and AC joints osteoarthritis  Inferior acromial prominent spur  Suggestion of decreased humeral acromial distance  "    XR L Shoulder: "AC joint mild osteoarthritis  Inferior acromial hypertrophic change    Suggestion of decreased humeral acromial distance "  Pain  Current pain ratin  At best pain ratin  At worst pain ratin  Location: right shoulder, clavicle ; left shoulder   Quality: ache, sore  Alleviating factors: CBD oil, Ibuprofen, heat > cold  Exacerbated by: reaching overhead, reaching behind back, laying on R / L sides, donning bra, putting on seatbelt, reaching across body  Social Support    Employment status: working (RN [employer] )  Hand dominance: right    Patient Goals  Patient goal: improve mobility, increase strength        Objective     Postural Observations  Seated posture: fair  Standing posture: fair        Tenderness     Left Shoulder   Tenderness in the clavicle  Right Shoulder  Tenderness in the clavicle  Cervical/Thoracic Screen   Cervical range of motion within normal limits    Active Range of Motion   Left Shoulder   Flexion: 141 degrees with pain  Abduction: 133 degrees with pain  External rotation BTH: T3   Internal rotation BTB: T6     Right Shoulder   Flexion: 150 degrees with pain  Abduction: 142 degrees with pain  External rotation BTH: T3 with pain  Internal rotation BTB: T7 with pain    Passive Range of Motion   Left Shoulder   Flexion: 175 degrees   Abduction: 175 degrees   External rotation 90°: 90 degrees   Internal rotation 90°: 65 degrees     Right Shoulder   Flexion: 160 degrees with pain  Abduction: 158 degrees   External rotation 90°: 90 degrees with pain  Internal rotation 90°: 55 degrees with pain    Joint Play   Left Shoulder  Hypomobile in the anterior capsule, posterior capsule and inferior capsule  Right Shoulder  Hypomobile in the anterior capsule, posterior capsule and inferior capsule       Strength/Myotome Testing     Left Shoulder     Planes of Motion   Flexion: 3+ (+) superior migration of GH head   Abduction: 3+ (+) superior migration of GH head   External rotation at 0°: 5   Internal rotation at 0°: 5     Right Shoulder     Planes of Motion   Flexion: 3+ (+) superior migration of [address] head   Abduction: 3+ (+) superior migration of [address] head   External rotation at 0°: 4   Internal rotation at 0°: 5     Left Elbow   Flexion: 5  Extension: 5    Right Elbow   Flexion: 5  Extension: 5    Left Wrist/Hand   Wrist extension: 5  Wrist flexion: 5    Right Wrist/Hand   Wrist extension: 5  Wrist flexion: 5    Tests     Left Shoulder   Negative lift-off  Right Shoulder   Positive lift-off                Precautions: standard      Manuals 2/7                                                                Neuro Re-Ed                                                                                                        Ther Ex             UBE             Pulleys             Giovanylide: M, L rows             TB ER             ER - s/l             Bilateral ER with scapular retraction             Bent over row                          Ther Activity                                       Gait Training                                       Modalities

## 2022-02-09 DIAGNOSIS — I10 ESSENTIAL HYPERTENSION: ICD-10-CM

## 2022-02-09 RX ORDER — HYDROCHLOROTHIAZIDE 50 MG/1
50 TABLET ORAL DAILY
Qty: 90 TABLET | Refills: 1 | Status: SHIPPED | OUTPATIENT
Start: 2022-02-09

## 2022-02-10 ENCOUNTER — APPOINTMENT (OUTPATIENT)
Dept: PHYSICAL THERAPY | Facility: CLINIC | Age: 57
End: 2022-02-10
Payer: COMMERCIAL

## 2022-02-10 DIAGNOSIS — F51.01 PRIMARY INSOMNIA: ICD-10-CM

## 2022-02-10 RX ORDER — ZOLPIDEM TARTRATE 5 MG/1
TABLET ORAL
Qty: 30 TABLET | Refills: 0 | Status: SHIPPED | OUTPATIENT
Start: 2022-02-10 | End: 2022-04-22 | Stop reason: SDUPTHER

## 2022-02-11 ENCOUNTER — APPOINTMENT (OUTPATIENT)
Dept: RADIOLOGY | Facility: CLINIC | Age: 57
End: 2022-02-11
Payer: COMMERCIAL

## 2022-02-11 ENCOUNTER — OFFICE VISIT (OUTPATIENT)
Dept: URGENT CARE | Facility: CLINIC | Age: 57
End: 2022-02-11
Payer: COMMERCIAL

## 2022-02-11 VITALS
HEART RATE: 96 BPM | OXYGEN SATURATION: 99 % | RESPIRATION RATE: 18 BRPM | WEIGHT: 284 LBS | BODY MASS INDEX: 48.49 KG/M2 | HEIGHT: 64 IN | TEMPERATURE: 98.2 F

## 2022-02-11 DIAGNOSIS — S32.030A COMPRESSION FRACTURE OF L3 VERTEBRA, INITIAL ENCOUNTER (HCC): Primary | ICD-10-CM

## 2022-02-11 DIAGNOSIS — M54.50 ACUTE MIDLINE LOW BACK PAIN WITHOUT SCIATICA: ICD-10-CM

## 2022-02-11 PROCEDURE — 99213 OFFICE O/P EST LOW 20 MIN: CPT | Performed by: PHYSICIAN ASSISTANT

## 2022-02-11 PROCEDURE — 72100 X-RAY EXAM L-S SPINE 2/3 VWS: CPT

## 2022-02-11 NOTE — PATIENT INSTRUCTIONS
Refer to Ortho  Rest, ice  Tylenol and motrin OTC  Follow up with Primary Care Physician  Return to clinic or go to the nearest Emergency Department with new or worsening symptoms as discussed

## 2022-02-11 NOTE — PROGRESS NOTES
[facility]        NAME: Tanna Currie is a 55 y o  female  : 1965    MRN: [id number]  DATE: 2022  TIME: 8:59 AM    Assessment and Plan   Compression fracture of L3 vertebra, initial encounter ([facility] ) [S32 030A]  1  Compression fracture of L3 vertebra, initial encounter ([facility] )  XR spine lumbar 2 or 3 views injury    Ambulatory Referral to Orthopedic Surgery    oxyCODONE (ROXICODONE) 10 MG TABS         Patient Instructions     Patient Instructions   Refer to Ortho  Rest, ice  Tylenol and motrin OTC  Follow up with Primary Care Physician  Return to clinic or go to the nearest Emergency Department with new or worsening symptoms as discussed  **Portions of the record may have been created with voice recognition software  Occasional wrong word or "sound a like" substitutions may have occurred due to the inherent limitations of voice recognition software  Read the chart carefully and recognize, using context, where substitutions have occurred  **     Chief Complaint     Chief Complaint   Patient presents with    Back Pain     tripped and fell backwards Wed night  complains of low back pain  hx of fusion and is concerned the injury is so close to the surgical site  History of Present Illness     50yo female presents to clinic with complaints of low back pain x 2 days  She fell backwards after losing balance and landed onto her tailbone  She reports immediate pain  She has hx of neuropathy and states it causes her to ocassionally fall  She had an L4/L5 laminectomy and fusion in 2019  She reports pain in the middle of her low back  Pain is constant and worse with witting and improved with advil and standing  Review of Systems     Review of Systems   Constitutional: Negative for fatigue and fever  Respiratory: Negative for shortness of breath  Cardiovascular: Negative for chest pain  Genitourinary: Negative  Negative for difficulty urinating     Musculoskeletal: Positive for back pain  Negative for arthralgias, gait problem, joint swelling, myalgias and neck pain  Skin: Negative for wound  Neurological: Negative for dizziness, weakness, numbness and headaches           Current Medications     Current Outpatient Medications:     buPROPion (WELLBUTRIN XL) 300 mg 24 hr tablet, TAKE 1 TABLET BY MOUTH EVERY DAY, Disp: 90 tablet, Rfl: 1    hydrochlorothiazide (HYDRODIURIL) 50 mg tablet, Take 1 tablet (50 mg total) by mouth daily, Disp: 90 tablet, Rfl: 1    multivitamin (THERAGRAN) TABS, Take 1 tablet by mouth daily, Disp: , Rfl:     valsartan (DIOVAN) 320 MG tablet, Take 1 tablet (320 mg total) by mouth daily, Disp: 90 tablet, Rfl: 3    zolpidem (AMBIEN) 5 mg tablet, TAKE 1 TABLET BY MOUTH DAILY AT BEDTIME AS NEEDED FOR SLEEP, Disp: 30 tablet, Rfl: 0    calcium carbonate (OS-REI) 600 MG tablet, Take 600 mg by mouth 2 (two) times a day with meals, Disp: , Rfl:     fluticasone-vilanterol (Breo Ellipta) 200-25 MCG/INH inhaler, Inhale 1 puff daily Rinse mouth after use , Disp: 60 blister, Rfl: 0    hydrochlorothiazide (HYDRODIURIL) 25 mg tablet, TAKE 1 TABLET BY MOUTH EVERY DAY (Patient not taking: Reported on 1/25/2022), Disp: 90 tablet, Rfl: 1    ondansetron (ZOFRAN) 4 mg tablet, Take 1 tablet (4 mg total) by mouth every 8 (eight) hours as needed for nausea or vomiting, Disp: 20 tablet, Rfl: 0    oxyCODONE (ROXICODONE) 10 MG TABS, Take 0 5 tablets (5 mg total) by mouth every 4 (four) hours as needed for moderate pain for up to 2 days Max Daily Amount: 30 mg, Disp: 12 tablet, Rfl: 0    Promethazine-DM (PHENERGAN-DM) 6 25-15 mg/5 mL oral syrup, Take 10 mL by mouth 3 (three) times a day as needed for cough (Patient not taking: Reported on 1/25/2022 ), Disp: 240 mL, Rfl: 1    Promethazine-DM (PHENERGAN-DM) 6 25-15 mg/5 mL oral syrup, Take 10 mL by mouth 3 (three) times a day as needed for cough (Patient not taking: Reported on 1/25/2022 ), Disp: 240 mL, Rfl: 1    Current Allergies     Allergies as of 02/11/2022 - Reviewed 02/07/2022   Allergen Reaction Noted    Azithromycin Nausea Only 08/17/2016    Erythromycin  08/17/2016            The following portions of the patient's history were reviewed and updated as appropriate: allergies, current medications, past family history, past medical history, past social history, past surgical history and problem list      Past Medical History:   Diagnosis Date    Eye disease     EBMD    Hypertension     Psychiatric disorder     depression       Past Surgical History:   Procedure Laterality Date    BACK SURGERY      REPLACEMENT TOTAL KNEE Bilateral        Family History   Problem Relation Age of Onset    No Known Problems Mother     No Known Problems Father          Medications have been verified  Objective     Pulse 96   Temp 98 2 °F (36 8 °C) (Temporal)   Resp 18   Ht 5' 4" (1 626 m)   Wt 129 kg (284 lb)   SpO2 99%   BMI 48 75 kg/m²        Physical Exam     Physical Exam  Vitals and nursing note reviewed  Constitutional:       Appearance: Normal appearance  HENT:      Head: Normocephalic and atraumatic  Cardiovascular:      Rate and Rhythm: Normal rate  Pulmonary:      Effort: Pulmonary effort is normal    Musculoskeletal:      Lumbar back: Tenderness present  No spasms  Neurological:      Mental Status: She is alert

## 2022-02-12 RX ORDER — OXYCODONE HYDROCHLORIDE 10 MG/1
5 TABLET ORAL EVERY 4 HOURS PRN
Qty: 12 TABLET | Refills: 0 | Status: SHIPPED | OUTPATIENT
Start: 2022-02-12 | End: 2022-02-15 | Stop reason: SDUPTHER

## 2022-02-14 ENCOUNTER — APPOINTMENT (OUTPATIENT)
Dept: PHYSICAL THERAPY | Facility: CLINIC | Age: 57
End: 2022-02-14
Payer: COMMERCIAL

## 2022-02-15 ENCOUNTER — OFFICE VISIT (OUTPATIENT)
Dept: FAMILY MEDICINE CLINIC | Facility: CLINIC | Age: 57
End: 2022-02-15
Payer: COMMERCIAL

## 2022-02-15 VITALS — OXYGEN SATURATION: 98 % | DIASTOLIC BLOOD PRESSURE: 80 MMHG | HEART RATE: 88 BPM | SYSTOLIC BLOOD PRESSURE: 140 MMHG

## 2022-02-15 DIAGNOSIS — M80.00XD AGE-RELATED OSTEOPOROSIS WITH CURRENT PATHOLOGICAL FRACTURE WITH ROUTINE HEALING: ICD-10-CM

## 2022-02-15 DIAGNOSIS — I10 ESSENTIAL HYPERTENSION: Primary | ICD-10-CM

## 2022-02-15 DIAGNOSIS — S32.030A COMPRESSION FRACTURE OF L3 VERTEBRA, INITIAL ENCOUNTER (HCC): ICD-10-CM

## 2022-02-15 DIAGNOSIS — S32.030D CLOSED COMPRESSION FRACTURE OF L3 LUMBAR VERTEBRA WITH ROUTINE HEALING, SUBSEQUENT ENCOUNTER: ICD-10-CM

## 2022-02-15 DIAGNOSIS — E66.01 MORBID OBESITY (HCC): ICD-10-CM

## 2022-02-15 DIAGNOSIS — M43.16 SPONDYLOLISTHESIS AT L4-L5 LEVEL: ICD-10-CM

## 2022-02-15 PROCEDURE — 3079F DIAST BP 80-89 MM HG: CPT | Performed by: FAMILY MEDICINE

## 2022-02-15 PROCEDURE — 3077F SYST BP >= 140 MM HG: CPT | Performed by: FAMILY MEDICINE

## 2022-02-15 PROCEDURE — 1036F TOBACCO NON-USER: CPT | Performed by: FAMILY MEDICINE

## 2022-02-15 PROCEDURE — 99214 OFFICE O/P EST MOD 30 MIN: CPT | Performed by: FAMILY MEDICINE

## 2022-02-15 RX ORDER — OXYCODONE HYDROCHLORIDE 10 MG/1
5 TABLET ORAL EVERY 4 HOURS PRN
Qty: 30 TABLET | Refills: 0 | Status: SHIPPED | OUTPATIENT
Start: 2022-02-15 | End: 2022-02-17

## 2022-02-15 NOTE — ASSESSMENT & PLAN NOTE
Follow-up evaluation with spine orthopedic surgery scheduled for March  Patient is continuing on pain medication  I renewed her medication for her today as that was the purpose of her visit coming here    She will follow-up with the orthopedic spine surgeon and he should take over her care regarding her back pain and treatment at that point

## 2022-02-15 NOTE — ASSESSMENT & PLAN NOTE
Patient had a recent fall with compression fracture at L3 will obtain bone density test at this time

## 2022-02-15 NOTE — ASSESSMENT & PLAN NOTE
Spondylolisthesis with low back pain this would improve with weight reduction she will need physical therapy again if symptoms do not improve or worsen    She recently had a fall injury with compression fracture at L3 just above the previous spinal fusion at L4-5

## 2022-02-15 NOTE — ASSESSMENT & PLAN NOTE
Hypertension stable control with valsartan and hydrochlorothiazide avoid sodium exercise regularly work on weight reduction

## 2022-02-15 NOTE — PROGRESS NOTES
Assessment/Plan:       Problem List Items Addressed This Visit        Cardiovascular and Mediastinum    Essential hypertension - Primary     Hypertension stable control with valsartan and hydrochlorothiazide avoid sodium exercise regularly work on weight reduction            Musculoskeletal and Integument    Spondylolisthesis at L4-L5 level     Spondylolisthesis with low back pain this would improve with weight reduction she will need physical therapy again if symptoms do not improve or worsen  She recently had a fall injury with compression fracture at L3 just above the previous spinal fusion at L4-5         Closed compression fracture of L3 vertebra [facility])     Follow-up evaluation with spine orthopedic surgery scheduled for March  Patient is continuing on pain medication  I renewed her medication for her today as that was the purpose of her visit coming here  She will follow-up with the orthopedic spine surgeon and he should take over her care regarding her back pain and treatment at that point         Age-related osteoporosis with current pathological fracture with routine healing     Patient had a recent fall with compression fracture at L3 will obtain bone density test at this time         Relevant Orders    DXA bone density spine hip and pelvis       Other    Morbid obesity ([facility] )     Obesity she will need to monitor diet avoiding carbohydrates work on weight reduction           Other Visit Diagnoses     Compression fracture of L3 vertebra, initial encounter ([facility] )        Relevant Medications    oxyCODONE (ROXICODONE) 10 MG TABS            Subjective:      Patient ID: Nigel Castro is a 55 y o  female      Patient is here today for follow-up evaluation recent acute back pain revealing a compression fracture at L3 seen at urgent care and referred now for spine surgery evaluation      The following portions of the patient's history were reviewed and updated as appropriate: allergies, current medications, past family history, past medical history, past social history, past surgical history and problem list     Review of Systems   Constitutional: Negative for chills, fatigue and fever  HENT: Negative for congestion, nosebleeds, rhinorrhea, sinus pressure and sore throat  Eyes: Negative for discharge and redness  Respiratory: Negative for cough and shortness of breath  Cardiovascular: Negative for chest pain, palpitations and leg swelling  Gastrointestinal: Negative for abdominal pain, blood in stool and nausea  Endocrine: Negative for cold intolerance, heat intolerance and polyuria  Genitourinary: Negative for dysuria and frequency  Musculoskeletal: Negative for arthralgias, back pain and myalgias  Skin: Negative for rash  Neurological: Negative for dizziness, weakness and headaches  Hematological: Negative for adenopathy  Psychiatric/Behavioral: Negative for behavioral problems and sleep disturbance  The patient is not nervous/anxious  Objective:      /80 (BP Location: Left arm, Patient Position: Standing)   Pulse 88   SpO2 98%        Physical Exam  Vitals and nursing note reviewed  Constitutional:       General: She is not in acute distress  Appearance: Normal appearance  She is well-developed  She is obese  HENT:      Head: Normocephalic and atraumatic  Right Ear: External ear normal       Left Ear: External ear normal       Nose: Nose normal       Mouth/Throat:      Pharynx: No oropharyngeal exudate  Eyes:      General: No scleral icterus  Right eye: No discharge  Left eye: No discharge  Conjunctiva/sclera: Conjunctivae normal       Pupils: Pupils are equal, round, and reactive to light  Neck:      Thyroid: No thyromegaly  Vascular: No JVD  Cardiovascular:      Rate and Rhythm: Normal rate and regular rhythm  Pulses: Normal pulses  Heart sounds: Normal heart sounds  No murmur heard        Pulmonary:      Effort: Pulmonary effort is normal       Breath sounds: No wheezing or rales  Chest:      Chest wall: No tenderness  Abdominal:      General: Bowel sounds are normal  There is no distension  Palpations: Abdomen is soft  There is no mass  Tenderness: There is no abdominal tenderness  Musculoskeletal:         General: No tenderness or deformity  Normal range of motion  Cervical back: Normal range of motion  Lymphadenopathy:      Cervical: No cervical adenopathy  Skin:     General: Skin is warm and dry  Capillary Refill: Capillary refill takes less than 2 seconds  Findings: No rash  Neurological:      General: No focal deficit present  Mental Status: She is alert and oriented to person, place, and time  Mental status is at baseline  Cranial Nerves: No cranial nerve deficit  Coordination: Coordination normal       Deep Tendon Reflexes: Reflexes are normal and symmetric  Reflexes normal    Psychiatric:         Mood and Affect: Mood normal          Behavior: Behavior normal          Thought Content: Thought content normal          Judgment: Judgment normal           Data:    Laboratory Results: I have personally reviewed the pertinent laboratory results/reports   Radiology/Other Diagnostic Testing Results: I have personally reviewed pertinent reports         Lab Results   Component Value Date    WBC 3 76 (L) 10/22/2021    HGB 14 3 10/22/2021    HCT 43 1 10/22/2021    MCV 94 10/22/2021     10/22/2021     Lab Results   Component Value Date    K 3 8 09/10/2021    CL 99 (L) 09/10/2021    CO2 26 09/10/2021    BUN 6 09/10/2021    CREATININE 0 64 09/10/2021    GLUF 84 09/10/2021    CALCIUM 9 4 09/10/2021    AST 24 09/10/2021    ALT 28 09/10/2021    ALKPHOS 106 09/10/2021    EGFR 101 09/10/2021     Lab Results   Component Value Date    CHOLESTEROL 164 09/10/2021    CHOLESTEROL 193 10/14/2020     Lab Results   Component Value Date    HDL 80 09/10/2021    HDL 78 10/14/2020     Lab Results Component Value Date    LDLCALC 71 09/10/2021    LDLCALC 102 (H) 10/14/2020     Lab Results   Component Value Date    TRIG 63 09/10/2021    TRIG 63 10/14/2020     No results found for: [place], [place]  Lab Results   Component Value Date    OIN3ILZPQTEJ 1 810 09/10/2021     Lab Results   Component Value Date    HGBA1C 4 9 09/10/2021     No results found for: PSA    Dearl Vita DO

## 2022-02-16 DIAGNOSIS — I10 ESSENTIAL HYPERTENSION: ICD-10-CM

## 2022-02-16 DIAGNOSIS — F51.01 PRIMARY INSOMNIA: ICD-10-CM

## 2022-02-16 DIAGNOSIS — F32.A DEPRESSION, UNSPECIFIED DEPRESSION TYPE: ICD-10-CM

## 2022-02-16 RX ORDER — VALSARTAN 320 MG/1
TABLET ORAL
Qty: 90 TABLET | Refills: 3 | Status: SHIPPED | OUTPATIENT
Start: 2022-02-16

## 2022-02-17 ENCOUNTER — APPOINTMENT (OUTPATIENT)
Dept: PHYSICAL THERAPY | Facility: CLINIC | Age: 57
End: 2022-02-17
Payer: COMMERCIAL

## 2022-02-21 ENCOUNTER — APPOINTMENT (OUTPATIENT)
Dept: PHYSICAL THERAPY | Facility: CLINIC | Age: 57
End: 2022-02-21
Payer: COMMERCIAL

## 2022-02-24 ENCOUNTER — APPOINTMENT (OUTPATIENT)
Dept: PHYSICAL THERAPY | Facility: CLINIC | Age: 57
End: 2022-02-24
Payer: COMMERCIAL

## 2022-02-28 ENCOUNTER — APPOINTMENT (OUTPATIENT)
Dept: PHYSICAL THERAPY | Facility: CLINIC | Age: 57
End: 2022-02-28
Payer: COMMERCIAL

## 2022-03-10 DIAGNOSIS — M43.16 SPONDYLOLISTHESIS AT L4-L5 LEVEL: Primary | ICD-10-CM

## 2022-03-10 DIAGNOSIS — S32.030D CLOSED COMPRESSION FRACTURE OF L3 LUMBAR VERTEBRA WITH ROUTINE HEALING, SUBSEQUENT ENCOUNTER: ICD-10-CM

## 2022-03-16 DIAGNOSIS — I10 ESSENTIAL HYPERTENSION: ICD-10-CM

## 2022-03-16 RX ORDER — HYDROCHLOROTHIAZIDE 25 MG/1
TABLET ORAL
Qty: 90 TABLET | Refills: 1 | Status: SHIPPED | OUTPATIENT
Start: 2022-03-16

## 2022-03-16 NOTE — PROGRESS NOTES
Lucia Fam has attended a total of 1 physical therapy appointments to date  Patient was last treated on 2/7/22 and has no remaining appointments scheduled  Goals, objective and subjective information unable to be updated at this time  Patient will be discharged from physical therapy secondary to inactivity

## 2022-04-22 DIAGNOSIS — F51.01 PRIMARY INSOMNIA: ICD-10-CM

## 2022-04-22 RX ORDER — ZOLPIDEM TARTRATE 5 MG/1
5 TABLET ORAL
Qty: 30 TABLET | Refills: 0 | Status: SHIPPED | OUTPATIENT
Start: 2022-04-22 | End: 2022-06-28 | Stop reason: SDUPTHER

## 2022-05-15 DIAGNOSIS — F33.2 SEVERE EPISODE OF RECURRENT MAJOR DEPRESSIVE DISORDER, WITHOUT PSYCHOTIC FEATURES (HCC): ICD-10-CM

## 2022-05-16 RX ORDER — BUPROPION HYDROCHLORIDE 300 MG/1
TABLET ORAL
Qty: 90 TABLET | Refills: 1 | Status: SHIPPED | OUTPATIENT
Start: 2022-05-16

## 2022-06-17 ENCOUNTER — PATIENT MESSAGE (OUTPATIENT)
Dept: FAMILY MEDICINE CLINIC | Facility: CLINIC | Age: 57
End: 2022-06-17

## 2022-06-17 ENCOUNTER — TELEPHONE (OUTPATIENT)
Dept: FAMILY MEDICINE CLINIC | Facility: CLINIC | Age: 57
End: 2022-06-17

## 2022-06-17 NOTE — TELEPHONE ENCOUNTER
If patient an appointment with Amanda for next week to have a temporary disability form completed if needed for short-term

## 2022-06-17 NOTE — TELEPHONE ENCOUNTER
Pt made aware of message   She will call Monday after she talks to her employer to have a day off or leave early to come to the office

## 2022-06-28 DIAGNOSIS — F51.01 PRIMARY INSOMNIA: ICD-10-CM

## 2022-06-28 RX ORDER — ZOLPIDEM TARTRATE 5 MG/1
5 TABLET ORAL
Qty: 30 TABLET | Refills: 0 | Status: SHIPPED | OUTPATIENT
Start: 2022-06-28

## 2022-06-28 NOTE — PATIENT COMMUNICATION
Spoke with patient on phone and discussed we have late nights on select Tuesdays  Patient can only do 5:40pm or later  Wrote patients name down to call if something opens up  First open spot is in October

## 2022-11-17 ENCOUNTER — OFFICE VISIT (OUTPATIENT)
Dept: FAMILY MEDICINE CLINIC | Facility: CLINIC | Age: 57
End: 2022-11-17

## 2022-11-17 VITALS
WEIGHT: 284 LBS | DIASTOLIC BLOOD PRESSURE: 74 MMHG | TEMPERATURE: 97.7 F | OXYGEN SATURATION: 99 % | HEART RATE: 97 BPM | HEIGHT: 64 IN | SYSTOLIC BLOOD PRESSURE: 122 MMHG | BODY MASS INDEX: 48.49 KG/M2

## 2022-11-17 DIAGNOSIS — M43.16 SPONDYLOLISTHESIS AT L4-L5 LEVEL: ICD-10-CM

## 2022-11-17 DIAGNOSIS — M18.12 ARTHRITIS OF CARPOMETACARPAL (CMC) JOINT OF LEFT THUMB: ICD-10-CM

## 2022-11-17 DIAGNOSIS — M80.00XD AGE-RELATED OSTEOPOROSIS WITH CURRENT PATHOLOGICAL FRACTURE WITH ROUTINE HEALING: ICD-10-CM

## 2022-11-17 DIAGNOSIS — M18.11 ARTHRITIS OF CARPOMETACARPAL (CMC) JOINT OF RIGHT THUMB: ICD-10-CM

## 2022-11-17 DIAGNOSIS — Z00.00 ANNUAL PHYSICAL EXAM: ICD-10-CM

## 2022-11-17 DIAGNOSIS — I10 ESSENTIAL HYPERTENSION: Primary | ICD-10-CM

## 2022-11-17 DIAGNOSIS — F32.A DEPRESSION, UNSPECIFIED DEPRESSION TYPE: ICD-10-CM

## 2022-11-17 RX ORDER — DULOXETIN HYDROCHLORIDE 30 MG/1
30 CAPSULE, DELAYED RELEASE ORAL DAILY
Qty: 30 CAPSULE | Refills: 3 | Status: SHIPPED | OUTPATIENT
Start: 2022-11-17

## 2022-11-17 NOTE — PROGRESS NOTES
[facility]    NAME: Luis Angel Abarca  AGE: 65 y o  SEX: female  : 1965     DATE: 2022     Assessment and Plan:     Problem List Items Addressed This Visit        Cardiovascular and Mediastinum    Essential hypertension - Primary     Hypertension stable control with hydrochlorothiazide continue same dosage            Musculoskeletal and Integument    Spondylolisthesis at L4-L5 level     Add Duloxetine now PT if worse  Age-related osteoporosis with current pathological fracture with routine healing     Chronic pain  Radiates to RLE continue Calcium and add vitamin D now         Relevant Medications    DULoxetine (Cymbalta) 30 mg delayed release capsule    Arthritis of carpometacarpal (CMC) joint of left thumb     Refer for orthopedic hand evaluation as well as certified hand therapist evaluation for possible splint fabrication and treatment         Relevant Orders    Ambulatory Referral to Orthopedic Surgery    Ambulatory Referral to PT/OT Hand Therapy    Arthritis of carpometacarpal [place]) joint of right thumb     Refer for orthopedic hand specialist evaluation and certified hand therapist evaluation for possible splint fabrication and evaluation and treatment         Relevant Orders    Ambulatory Referral to Orthopedic Surgery    Ambulatory Referral to PT/OT Hand Therapy       Other    Depression     Depression with anxiety continue with Wellbutrin 300 mg tablets         Relevant Medications    DULoxetine (Cymbalta) 30 mg delayed release capsule   Other Visit Diagnoses     Annual physical exam        Relevant Orders    CBC and differential    Comprehensive metabolic panel    Lipid panel    TSH, 3rd generation with Free T4 reflex          Immunizations and preventive care screenings were discussed with patient today   Appropriate education was printed on patient's after visit summary  Counseling:  Alcohol/drug use: discussed moderation in alcohol intake, the recommendations for healthy alcohol use, and avoidance of illicit drug use  Dental Health: discussed importance of regular tooth brushing, flossing, and dental visits  Injury prevention: discussed safety/seat belts, safety helmets, smoke detectors, carbon dioxide detectors, and smoking near bedding or upholstery  Sexual health: discussed sexually transmitted diseases, partner selection, use of condoms, avoidance of unintended pregnancy, and contraceptive alternatives  · Exercise: the importance of regular exercise/physical activity was discussed  Recommend exercise 3-5 times per week for at least 30 minutes  No follow-ups on file  Chief Complaint:     Chief Complaint   Patient presents with   • Depression   • Pain     Patient complains of pain in hands , back , legs   History of Present Illness:     Adult Annual Physical   Patient here for a comprehensive physical exam  The patient reports no problems  Diet and Physical Activity  · Diet/Nutrition: well balanced diet  · Exercise: no formal exercise  Depression Screening  PHQ-2/9 Depression Screening    Little interest or pleasure in doing things: 3 - nearly every day  Feeling down, depressed, or hopeless: 2 - more than half the days  Trouble falling or staying asleep, or sleeping too much: 3 - nearly every day  Feeling tired or having little energy: 3 - nearly every day  Poor appetite or overeatin - more than half the days  Feeling bad about yourself - or that you are a failure or have let yourself or your family down: 3 - nearly every day  Trouble concentrating on things, such as reading the newspaper or watching television: 3 - nearly every day  Moving or speaking so slowly that other people could have noticed   Or the opposite - being so fidgety or restless that you have been moving around a lot more than usual: 0 - not at all  Thoughts that you would be better off dead, or of hurting yourself in some way: 0 - not at all  PHQ-9 Score: 19   PHQ-9 Interpretation: Moderately severe depression        General Health  · Sleep: sleeps well  · Hearing: normal - bilateral   · Vision: no vision problems  · Dental: regular dental visits  /GYN Health  · Patient is: postmenopausal  · Last menstrual period:   · Contraceptive method:       Review of Systems:     Review of Systems   Constitutional: Negative for chills, fatigue and fever  HENT: Negative for congestion, nosebleeds, rhinorrhea, sinus pressure and sore throat  Eyes: Negative for discharge and redness  Respiratory: Negative for cough and shortness of breath  Cardiovascular: Negative for chest pain, palpitations and leg swelling  Gastrointestinal: Negative for abdominal pain, blood in stool and nausea  Endocrine: Negative for cold intolerance, heat intolerance and polyuria  Genitourinary: Negative for dysuria and frequency  Musculoskeletal: Negative for arthralgias, back pain and myalgias  Skin: Negative for rash  Neurological: Negative for dizziness, weakness and headaches  Hematological: Negative for adenopathy  Psychiatric/Behavioral: Negative for behavioral problems and sleep disturbance  The patient is not nervous/anxious         Past Medical History:     Past Medical History:   Diagnosis Date   • Arthritis     Chronic   • Depression     Chronic   • Eye disease     EBMD   • Heart murmur     Chronic   • Hypertension    • Obesity     Chronic   • Psychiatric disorder     depression   • Visual impairment 2020    Chronic      Past Surgical History:     Past Surgical History:   Procedure Laterality Date   • BACK SURGERY     •  SECTION  1985,1988    2   • JOINT REPLACEMENT      BKR   • REPLACEMENT TOTAL KNEE Bilateral    • SPINE SURGERY  2019    L4-L5 fusion      Social History:     Social History     Socioeconomic History   • Marital status: /Civil [employer]     Spouse name: Not on file   • Number of children: Not on file   • Years of education: Not on file   • Highest education level: Not on file   Occupational History   • Not on file   Tobacco Use   • Smoking status: Never   • Smokeless tobacco: Never   Vaping Use   • Vaping Use: Never used   Substance and Sexual Activity   • Alcohol use: Yes     Alcohol/week: 2 0 standard drinks     Types: 2 Glasses of wine per week   • Drug use: Never   • Sexual activity: Yes     Partners: Male     Birth control/protection: Other   Other Topics Concern   • Not on file   Social History Narrative   • Not on file     Social Determinants of Health     Financial Resource Strain: Not on file   Food Insecurity: Not on file   Transportation Needs: Not on file   Physical Activity: Not on file   Stress: Not on file   Social Connections: Not on file   Intimate Partner Violence: Not on file   Housing Stability: Not on file      Family History:     Family History   Problem Relation Age of Onset   • Depression Mother    • Hyperlipidemia Mother    • Arthritis Mother         Psoriatic   • Autoimmune disease Mother    • Cancer Mother         Skin face   • Anxiety disorder Mother    • OCD Mother    • Alcohol abuse Father            • Hypertension Father    • Hyperlipidemia Father    • Substance Abuse Daughter         In recovery since    • Asthma Daughter         Exercise induced      Current Medications:     Current Outpatient Medications   Medication Sig Dispense Refill   • DULoxetine (Cymbalta) 30 mg delayed release capsule Take 1 capsule (30 mg total) by mouth daily 30 capsule 3   • buPROPion (WELLBUTRIN XL) 300 mg 24 hr tablet TAKE 1 TABLET BY MOUTH EVERY DAY 30 tablet 0   • hydrochlorothiazide (HYDRODIURIL) 50 mg tablet Take 1 tablet (50 mg total) by mouth daily 90 tablet 1   • multivitamin (THERAGRAN) TABS Take 1 tablet by mouth daily       No current facility-administered medications for this visit  Allergies: Allergies   Allergen Reactions   • Azithromycin Nausea Only     Category: sensitivity;    • Erythromycin       Physical Exam:     /74   Pulse 97   Temp 97 7 °F (36 5 °C)   Ht 5' 4" (1 626 m)   Wt 129 kg (284 lb)   SpO2 99%   BMI 48 75 kg/m²     Physical Exam  Vitals and nursing note reviewed  Constitutional:       General: She is not in acute distress  Appearance: Normal appearance  She is well-developed  She is obese  HENT:      Head: Normocephalic and atraumatic  Right Ear: Tympanic membrane, ear canal and external ear normal       Left Ear: Tympanic membrane, ear canal and external ear normal       Nose: Nose normal       Mouth/Throat:      Mouth: Mucous membranes are moist       Pharynx: Oropharynx is clear  Eyes:      Extraocular Movements: Extraocular movements intact  Conjunctiva/sclera: Conjunctivae normal       Pupils: Pupils are equal, round, and reactive to light  Cardiovascular:      Rate and Rhythm: Normal rate and regular rhythm  Pulses: Normal pulses  Heart sounds: Normal heart sounds  No murmur heard  Pulmonary:      Effort: Pulmonary effort is normal  No respiratory distress  Breath sounds: Normal breath sounds  Abdominal:      General: Bowel sounds are normal       Palpations: Abdomen is soft  Tenderness: There is no abdominal tenderness  Musculoskeletal:         General: No swelling  Normal range of motion  Cervical back: Normal range of motion and neck supple  Skin:     General: Skin is warm and dry  Capillary Refill: Capillary refill takes less than 2 seconds  Neurological:      General: No focal deficit present  Mental Status: She is alert and oriented to person, place, and time  Mental status is at baseline  Psychiatric:         Mood and Affect: Mood normal          Behavior: Behavior normal          Thought Content:  Thought content normal          Judgment: Judgment normal           Oneil Snowball Herb, [address]

## 2022-11-17 NOTE — ASSESSMENT & PLAN NOTE
Refer for orthopedic hand evaluation as well as certified hand therapist evaluation for possible splint fabrication and treatment

## 2022-11-17 NOTE — PROGRESS NOTES
BMI Counseling: Body mass index is 48 75 kg/m²  The BMI is above normal  Nutrition recommendations include reducing portion sizes, decreasing overall calorie intake, reducing fast food intake, consuming healthier snacks, decreasing soda and/or juice intake, moderation in carbohydrate intake, increasing intake of lean protein and reducing intake of saturated fat and trans fat  Exercise recommendations include exercising 3-5 times per week

## 2022-11-17 NOTE — ASSESSMENT & PLAN NOTE
Refer for orthopedic hand specialist evaluation and certified hand therapist evaluation for possible splint fabrication and evaluation and treatment

## 2022-11-22 DIAGNOSIS — F33.2 SEVERE EPISODE OF RECURRENT MAJOR DEPRESSIVE DISORDER, WITHOUT PSYCHOTIC FEATURES (HCC): ICD-10-CM

## 2022-11-23 DIAGNOSIS — F33.2 SEVERE EPISODE OF RECURRENT MAJOR DEPRESSIVE DISORDER, WITHOUT PSYCHOTIC FEATURES (HCC): ICD-10-CM

## 2022-11-23 RX ORDER — BUPROPION HYDROCHLORIDE 300 MG/1
300 TABLET ORAL DAILY
Qty: 30 TABLET | Refills: 0 | Status: SHIPPED | OUTPATIENT
Start: 2022-11-23 | End: 2022-11-23

## 2022-11-23 RX ORDER — BUPROPION HYDROCHLORIDE 300 MG/1
300 TABLET ORAL EVERY MORNING
Qty: 90 TABLET | Refills: 0 | Status: SHIPPED | OUTPATIENT
Start: 2022-11-23

## 2022-12-27 DIAGNOSIS — I10 ESSENTIAL HYPERTENSION: ICD-10-CM

## 2022-12-27 RX ORDER — HYDROCHLOROTHIAZIDE 50 MG/1
50 TABLET ORAL DAILY
Qty: 90 TABLET | Refills: 1 | Status: SHIPPED | OUTPATIENT
Start: 2022-12-27

## 2023-01-30 ENCOUNTER — TELEMEDICINE (OUTPATIENT)
Dept: FAMILY MEDICINE CLINIC | Facility: CLINIC | Age: 58
End: 2023-01-30

## 2023-01-30 DIAGNOSIS — Z00.00 ANNUAL PHYSICAL EXAM: ICD-10-CM

## 2023-01-30 DIAGNOSIS — M43.16 SPONDYLOLISTHESIS AT L4-L5 LEVEL: ICD-10-CM

## 2023-01-30 DIAGNOSIS — F33.42 RECURRENT MAJOR DEPRESSIVE DISORDER, IN FULL REMISSION (HCC): Primary | ICD-10-CM

## 2023-01-30 DIAGNOSIS — F51.04 PSYCHOPHYSIOLOGICAL INSOMNIA: ICD-10-CM

## 2023-01-30 DIAGNOSIS — Z12.31 VISIT FOR SCREENING MAMMOGRAM: ICD-10-CM

## 2023-01-30 DIAGNOSIS — F33.2 SEVERE EPISODE OF RECURRENT MAJOR DEPRESSIVE DISORDER, WITHOUT PSYCHOTIC FEATURES (HCC): ICD-10-CM

## 2023-01-30 DIAGNOSIS — I10 ESSENTIAL HYPERTENSION: ICD-10-CM

## 2023-01-30 RX ORDER — BUPROPION HYDROCHLORIDE 300 MG/1
300 TABLET ORAL EVERY MORNING
Qty: 90 TABLET | Refills: 0 | Status: SHIPPED | OUTPATIENT
Start: 2023-01-30

## 2023-01-30 RX ORDER — HYDROCHLOROTHIAZIDE 50 MG/1
50 TABLET ORAL DAILY
Qty: 90 TABLET | Refills: 1 | Status: SHIPPED | OUTPATIENT
Start: 2023-01-30

## 2023-01-30 RX ORDER — VALSARTAN 320 MG/1
320 TABLET ORAL DAILY
Qty: 90 TABLET | Refills: 3 | Status: SHIPPED | OUTPATIENT
Start: 2023-01-30

## 2023-01-30 RX ORDER — ZOLPIDEM TARTRATE 10 MG/1
10 TABLET ORAL
Qty: 30 TABLET | Refills: 0 | Status: SHIPPED | OUTPATIENT
Start: 2023-01-30

## 2023-01-30 NOTE — ASSESSMENT & PLAN NOTE
Refill Ambien at this time patient takes about 10 mg she had a 5 mg tablet and needs to at night on occasion to help with sleep patterns we will refill and follow-up in 4 months

## 2023-01-30 NOTE — ASSESSMENT & PLAN NOTE
Hypertension under stable control continuing with valsartan 320 mg along with hydrochlorothiazide 50 mg tablets patient should monitor blood pressure at home remain off sodium follow-up here at the office for an office visit in 4 months

## 2023-01-30 NOTE — ASSESSMENT & PLAN NOTE
Patient remains stable with Wellbutrin she is tied Cymbalta which did not work well with side effect she continues with her therapist we will renew at this time she occasionally needs Ambien for sleep refill on that at this point

## 2023-01-30 NOTE — PROGRESS NOTES
Virtual Regular Visit    Verification of patient location:    Patient is located in the following state in which I hold an active license [place]      Assessment/Plan:    Problem List Items Addressed This Visit        Cardiovascular and Mediastinum    Essential hypertension     Hypertension under stable control continuing with valsartan 320 mg along with hydrochlorothiazide 50 mg tablets patient should monitor blood pressure at home remain off sodium follow-up here at the office for an office visit in 4 months         Relevant Medications    valsartan (DIOVAN) 320 MG tablet    hydrochlorothiazide (HYDRODIURIL) 50 mg tablet       Musculoskeletal and Integument    Spondylolisthesis at L4-L5 level     Chronic low back pain currently stable follow-up on exam in 4 months in the office            Other    Depression    Relevant Medications    buPROPion (WELLBUTRIN XL) 300 mg 24 hr tablet    zolpidem (AMBIEN) 10 mg tablet    Recurrent major depressive disorder, in full remission ([facility] [address] ) - Primary     Patient remains stable with Wellbutrin she is tied Cymbalta which did not work well with side effect she continues with her therapist we will renew at this time she occasionally needs Ambien for sleep refill on that at this point         Relevant Medications    buPROPion (WELLBUTRIN XL) 300 mg 24 hr tablet    zolpidem (AMBIEN) 10 mg tablet    Psychophysiological insomnia     Refill Ambien at this time patient takes about 10 mg she had a 5 mg tablet and needs to at night on occasion to help with sleep patterns we will refill and follow-up in 4 months         Relevant Medications    buPROPion (WELLBUTRIN XL) 300 mg 24 hr tablet    zolpidem (AMBIEN) 10 mg tablet   Other Visit Diagnoses     Visit for screening mammogram        Relevant Orders    Mammo screening bilateral w 3d & cad    Annual physical exam        Relevant Orders    CBC and differential    Comprehensive metabolic panel    Lipid panel    TSH, 3rd generation with Free T4 reflex               Reason for visit is   Chief Complaint   Patient presents with   • Medication Management     Would like to go over medication to help with sleeping         Encounter provider Hubert Keys DO    Provider located at [place] [id number] [place] [address]  [phone]      Recent Visits  No visits were found meeting these conditions  Showing recent visits within past 7 days and meeting all other requirements  Today's Visits  Date Type Provider Dept   23 Telemedicine Hubert Keys DO [address] today's visits and meeting all other requirements  Future Appointments  No visits were found meeting these conditions  Showing future appointments within next 150 days and meeting all other requirements       The patient was identified by name and date of birth  Marysol Aceves was informed that this is a telemedicine visit and that the visit is being conducted through the [facility]  She agrees to proceed     My office door was closed  No one else was in the room  She acknowledged consent and understanding of privacy and security of the video platform  The patient has agreed to participate and understands they can discontinue the visit at any time  Patient is aware this is a billable service  Subjective  Marysol Aceves is a 65 y o  female          Follow-up evaluation discussion regarding Ambien and need for sleep medication       Past Medical History:   Diagnosis Date   • Arthritis     Chronic   • Depression     Chronic   • Eye disease     EBMD   • Heart murmur     Chronic   • Hypertension    • Obesity     Chronic   • Psychiatric disorder     depression   • Visual impairment 2020    Chronic       Past Surgical History:   Procedure Laterality Date   • BACK SURGERY     •  SECTION  1985,1988    2   • JOINT REPLACEMENT  2012    BKR   • REPLACEMENT TOTAL KNEE Bilateral    • SPINE SURGERY  2019    L4-L5 fusion       Current Outpatient Medications   Medication Sig Dispense Refill   • buPROPion (WELLBUTRIN XL) 300 mg 24 hr tablet Take 1 tablet (300 mg total) by mouth every morning 90 tablet 0   • hydrochlorothiazide (HYDRODIURIL) 50 mg tablet Take 1 tablet (50 mg total) by mouth daily 90 tablet 1   • multivitamin (THERAGRAN) TABS Take 1 tablet by mouth daily     • valsartan (DIOVAN) 320 MG tablet Take 1 tablet (320 mg total) by mouth daily 90 tablet 3   • zolpidem (AMBIEN) 10 mg tablet Take 1 tablet (10 mg total) by mouth daily at bedtime as needed for sleep 30 tablet 0     No current facility-administered medications for this visit  Allergies   Allergen Reactions   • Azithromycin Nausea Only     Category: sensitivity;    • Erythromycin        Review of Systems   Constitutional: Negative for chills, fatigue and fever  HENT: Negative for congestion, nosebleeds, rhinorrhea, sinus pressure and sore throat  Eyes: Negative for discharge and redness  Respiratory: Negative for cough and shortness of breath  Cardiovascular: Negative for chest pain, palpitations and leg swelling  Gastrointestinal: Negative for abdominal pain, blood in stool and nausea  Endocrine: Negative for cold intolerance, heat intolerance and polyuria  Genitourinary: Negative for dysuria and frequency  Musculoskeletal: Negative for arthralgias, back pain and myalgias  Skin: Negative for rash  Neurological: Negative for dizziness, weakness and headaches  Hematological: Negative for adenopathy  Psychiatric/Behavioral: Positive for sleep disturbance  Negative for behavioral problems  The patient is not nervous/anxious  Video Exam    There were no vitals filed for this visit  Physical Exam  Vitals and nursing note reviewed  Constitutional:       General: She is not in acute distress  Appearance: Normal appearance  She is well-developed  HENT:      Head: Normocephalic and atraumatic  Right Ear: Tympanic membrane and external ear normal       Left Ear: Tympanic membrane and external ear normal       Nose: Nose normal       Mouth/Throat:      Mouth: Mucous membranes are moist       Pharynx: Oropharynx is clear  No oropharyngeal exudate  Eyes:      General: No scleral icterus  Right eye: No discharge  Left eye: No discharge  Conjunctiva/sclera: Conjunctivae normal       Pupils: Pupils are equal, round, and reactive to light  Neck:      Thyroid: No thyromegaly  Vascular: No JVD  Cardiovascular:      Rate and Rhythm: Normal rate and regular rhythm  Pulses: Normal pulses  Heart sounds: Normal heart sounds  No murmur heard  Pulmonary:      Effort: Pulmonary effort is normal       Breath sounds: No wheezing or rales  Chest:      Chest wall: No tenderness  Abdominal:      General: Bowel sounds are normal  There is no distension  Palpations: Abdomen is soft  There is no mass  Tenderness: There is no abdominal tenderness  Musculoskeletal:         General: No tenderness or deformity  Normal range of motion  Cervical back: Normal range of motion  Lymphadenopathy:      Cervical: No cervical adenopathy  Skin:     General: Skin is warm and dry  Capillary Refill: Capillary refill takes less than 2 seconds  Findings: No rash  Neurological:      General: No focal deficit present  Mental Status: She is alert and oriented to person, place, and time  Mental status is at baseline  Cranial Nerves: No cranial nerve deficit  Coordination: Coordination normal       Deep Tendon Reflexes: Reflexes are normal and symmetric  Reflexes normal    Psychiatric:         Mood and Affect: Mood normal          Behavior: Behavior normal          Thought Content:  Thought content normal          Judgment: Judgment normal           I spent 30 minutes directly with the patient during this visit

## 2023-03-14 ENCOUNTER — OFFICE VISIT (OUTPATIENT)
Dept: FAMILY MEDICINE CLINIC | Facility: CLINIC | Age: 58
End: 2023-03-14

## 2023-03-14 VITALS
TEMPERATURE: 97.8 F | OXYGEN SATURATION: 99 % | HEIGHT: 64 IN | BODY MASS INDEX: 49.24 KG/M2 | WEIGHT: 288.4 LBS | HEART RATE: 80 BPM

## 2023-03-14 DIAGNOSIS — S32.030D CLOSED COMPRESSION FRACTURE OF L3 LUMBAR VERTEBRA WITH ROUTINE HEALING, SUBSEQUENT ENCOUNTER: ICD-10-CM

## 2023-03-14 DIAGNOSIS — I10 ESSENTIAL HYPERTENSION: ICD-10-CM

## 2023-03-14 DIAGNOSIS — H00.14 CHALAZION LEFT UPPER EYELID: ICD-10-CM

## 2023-03-14 DIAGNOSIS — M43.16 SPONDYLOLISTHESIS AT L4-L5 LEVEL: ICD-10-CM

## 2023-03-14 DIAGNOSIS — R26.89 POOR BALANCE: ICD-10-CM

## 2023-03-14 DIAGNOSIS — F33.42 RECURRENT MAJOR DEPRESSIVE DISORDER, IN FULL REMISSION (HCC): ICD-10-CM

## 2023-03-14 DIAGNOSIS — Z13.820 SCREENING FOR OSTEOPOROSIS: Primary | ICD-10-CM

## 2023-03-14 DIAGNOSIS — M80.00XD AGE-RELATED OSTEOPOROSIS WITH CURRENT PATHOLOGICAL FRACTURE WITH ROUTINE HEALING: ICD-10-CM

## 2023-03-14 DIAGNOSIS — E66.01 MORBID OBESITY (HCC): ICD-10-CM

## 2023-03-14 RX ORDER — HYDROCHLOROTHIAZIDE 50 MG/1
50 TABLET ORAL DAILY
Qty: 90 TABLET | Refills: 1 | Status: SHIPPED | OUTPATIENT
Start: 2023-03-14

## 2023-03-14 NOTE — ASSESSMENT & PLAN NOTE
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

## 2023-03-14 NOTE — PROGRESS NOTES
Assessment/Plan:       Problem List Items Addressed This Visit        Cardiovascular and Mediastinum    Essential hypertension     Hypertension stable control with valsartan 320 mg along with hydrochlorothiazide 50 mg tablets refill hydrochlorothiazide today follow-up at next visit in 6 months         Relevant Medications    hydrochlorothiazide (HYDRODIURIL) 50 mg tablet       Musculoskeletal and Integument    Spondylolisthesis at L4-L5 level     Longstanding low back pain if symptoms worsen refer for pain management evaluation  Patient should undergo physical therapy prior to pain management and work on a home stretching exercise program   Patient has chronic back pain and poor balance also 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 which has been off and on and she exercises this daily she has limited ability to walk distances and has had repeated falls    She will require a disability placard for her vehicle and I recommend a quad cane or walking sticks to help with ambulation balance and assistance         Closed compression fracture of L3 vertebra (HCC)     Continue with calcium at least 1200 mg daily and vitamin D 1000 units daily follow-up DEXA scan         Age-related osteoporosis with current pathological fracture with routine healing     Back pain chronic check DEXA scan now and repeat every 2 years continue calcium and vitamin D         Chalazion left upper eyelid     Present over the last 2 weeks about 1 cm in size patient will be referred to ophthalmology and continue with moist compresses         Relevant Orders    Ambulatory Referral to Ophthalmology       Other    Morbid obesity ([facility] [address] )     Work on diet weight reduction in light of comorbidities patient informed to reduce sodium intake and monitor carbohydrates         Recurrent major depressive disorder, in full remission ([address] )     Major depression overall stable now continuing with Wellbutrin 300 mg no change in dose sleep patterns are good energy levels are fine         Poor balance     Recommend walking sticks or cane and daily exercise  Other Visit Diagnoses     Screening for osteoporosis    -  Primary    Relevant Orders    DXA bone density spine hip and pelvis            Subjective:      Patient ID: Zandra Her is a 65 y o  female  Patient here for follow-up evaluation recheck on blood pressure complains of chronic back pain  Stress  Back Pain  Pertinent negatives include no abdominal pain, chest pain, dysuria, fever, headaches or weakness  Anxiety  Patient reports no chest pain, dizziness, nausea, nervous/anxious behavior, palpitations or shortness of breath  The following portions of the patient's history were reviewed and updated as appropriate: allergies, current medications, past family history, past medical history, past social history, past surgical history and problem list     Review of Systems   Constitutional: Negative for chills, fatigue and fever  HENT: Negative for congestion, nosebleeds, rhinorrhea, sinus pressure and sore throat  Eyes: Negative for discharge and redness  Respiratory: Negative for cough and shortness of breath  Cardiovascular: Negative for chest pain, palpitations and leg swelling  Gastrointestinal: Negative for abdominal pain, blood in stool and nausea  Endocrine: Negative for cold intolerance, heat intolerance and polyuria  Genitourinary: Negative for dysuria and frequency  Musculoskeletal: Positive for back pain and myalgias  Negative for arthralgias  Skin: Negative for rash  Neurological: Negative for dizziness, weakness and headaches  Hematological: Negative for adenopathy  Psychiatric/Behavioral: Negative for behavioral problems and sleep disturbance  The patient is not nervous/anxious            Objective:      Pulse 80   Temp 97 8 °F (36 6 °C)   Ht 5' 4" (1 626 m)   Wt 131 kg (288 lb 6 4 oz)   SpO2 99%   BMI 49 50 kg/m²        Physical Exam  Vitals and nursing note reviewed  Constitutional:       General: She is not in acute distress  Appearance: Normal appearance  She is well-developed  She is obese  HENT:      Head: Normocephalic and atraumatic  Right Ear: Tympanic membrane, ear canal and external ear normal       Left Ear: Tympanic membrane, ear canal and external ear normal       Nose: Nose normal       Mouth/Throat:      Mouth: Mucous membranes are moist       Pharynx: Oropharynx is clear  No oropharyngeal exudate  Eyes:      General: No scleral icterus  Right eye: No discharge  Left eye: No discharge  Extraocular Movements: Extraocular movements intact  Conjunctiva/sclera: Conjunctivae normal       Pupils: Pupils are equal, round, and reactive to light  Neck:      Thyroid: No thyromegaly  Vascular: No JVD  Cardiovascular:      Rate and Rhythm: Normal rate and regular rhythm  Pulses: Normal pulses  Heart sounds: Normal heart sounds  No murmur heard  Pulmonary:      Effort: Pulmonary effort is normal       Breath sounds: No wheezing or rales  Chest:      Chest wall: No tenderness  Abdominal:      General: Bowel sounds are normal  There is no distension  Palpations: Abdomen is soft  There is no mass  Tenderness: There is no abdominal tenderness  Musculoskeletal:         General: Tenderness present  No deformity  Normal range of motion  Cervical back: Normal range of motion  Comments: L345 pain, RLE pain and numbness right leg and foot   Lymphadenopathy:      Cervical: No cervical adenopathy  Skin:     General: Skin is warm and dry  Capillary Refill: Capillary refill takes less than 2 seconds  Findings: No rash  Neurological:      General: No focal deficit present  Mental Status: She is alert and oriented to person, place, and time  Mental status is at baseline  Cranial Nerves: No cranial nerve deficit        Coordination: Coordination normal       Deep Tendon Reflexes: Reflexes are normal and symmetric  Reflexes normal    Psychiatric:         Mood and Affect: Mood normal          Behavior: Behavior normal          Thought Content: Thought content normal          Judgment: Judgment normal           Data:    Laboratory Results: I have personally reviewed the pertinent laboratory results/reports   Radiology/Other Diagnostic Testing Results: I have personally reviewed pertinent reports         Lab Results   Component Value Date    WBC 3 76 (L) 10/22/2021    HGB 14 3 10/22/2021    HCT 43 1 10/22/2021    MCV 94 10/22/2021     10/22/2021     Lab Results   Component Value Date    K 3 8 09/10/2021    CL 99 (L) 09/10/2021    CO2 26 09/10/2021    BUN 6 09/10/2021    CREATININE 0 64 09/10/2021    GLUF 84 09/10/2021    CALCIUM 9 4 09/10/2021    AST 24 09/10/2021    ALT 28 09/10/2021    ALKPHOS 106 09/10/2021    EGFR 101 09/10/2021     Lab Results   Component Value Date    CHOLESTEROL 164 09/10/2021    CHOLESTEROL 193 10/14/2020     Lab Results   Component Value Date    HDL 80 09/10/2021    HDL 78 10/14/2020     Lab Results   Component Value Date    LDLCALC 71 09/10/2021    LDLCALC 102 (H) 10/14/2020     Lab Results   Component Value Date    TRIG 63 09/10/2021    TRIG 63 10/14/2020     No results found for: [place], [place]  Lab Results   Component Value Date    CBV5THMOIRNW 1 810 09/10/2021     Lab Results   Component Value Date    HGBA1C 4 9 09/10/2021     No results found for: EDD Rudolph, DO

## 2023-03-14 NOTE — ASSESSMENT & PLAN NOTE
Present over the last 2 weeks about 1 cm in size patient will be referred to ophthalmology and continue with moist compresses

## 2023-03-14 NOTE — ASSESSMENT & PLAN NOTE
Major depression overall stable now continuing with Wellbutrin 300 mg no change in dose sleep patterns are good energy levels are fine

## 2023-03-14 NOTE — ASSESSMENT & PLAN NOTE
Work on diet weight reduction in light of comorbidities patient informed to reduce sodium intake and monitor carbohydrates

## 2023-03-14 NOTE — ASSESSMENT & PLAN NOTE
Hypertension stable control with valsartan 320 mg along with hydrochlorothiazide 50 mg tablets refill hydrochlorothiazide today follow-up at next visit in 6 months

## 2023-03-29 ENCOUNTER — TELEMEDICINE (OUTPATIENT)
Dept: FAMILY MEDICINE CLINIC | Facility: CLINIC | Age: 58
End: 2023-03-29

## 2023-03-29 DIAGNOSIS — F43.23 SITUATIONAL MIXED ANXIETY AND DEPRESSIVE DISORDER: Primary | ICD-10-CM

## 2023-03-29 DIAGNOSIS — F33.42 RECURRENT MAJOR DEPRESSIVE DISORDER, IN FULL REMISSION (HCC): ICD-10-CM

## 2023-03-29 RX ORDER — CLONAZEPAM 0.5 MG/1
0.5 TABLET ORAL 2 TIMES DAILY
Qty: 60 TABLET | Refills: 0 | Status: SHIPPED | OUTPATIENT
Start: 2023-03-29

## 2023-03-29 NOTE — PROGRESS NOTES
"Virtual Regular Visit    Verification of patient location:    Patient is located in the following state in which I hold an active license [place]      Assessment/Plan:    Problem List Items Addressed This Visit        Other    Recurrent major depressive disorder, in full remission ([address] )     Major depression relapse with going through divorce now she is trying to maintain stable emotional control while functioning at her job continue with Wellbutrin follow-up in 2 weeks continue to follow-up with therapy and counseling appointment will be upcoming         Situational mixed anxiety and depressive disorder - Primary     Patient is going through a divorce and trying to work full-time as well and her concentration and emotions are affected by her overall condition she is dealing with different emotions of anger stress frustration and sadness and having difficulty functioning and concentrating so at this point we discussed addition of low-dose clonazepam as she had taken Valium in the past for other problems but I would like to use a low-dose clonazepam for now and follow-up with her closely she also will be speaking to her therapist and counselor                 Reason for visit is   Chief Complaint   Patient presents with   • Anxiety     Patient c/o new onset anxiety - states shes feeling \"panicked\" wakes up in the middle of the night - patient is going through a divorce         Encounter provider Toña Kemp DO    Provider located at [facility]  [address] [address] [phone]      Recent Visits  No visits were found meeting these conditions    Showing recent visits within past 7 days and meeting all other requirements  Today's Visits  Date Type Provider Dept   03/29/23 Telemedicine [facility] DO [facility] [facility] and meeting all other requirements  Future Appointments  No visits were found meeting these " conditions  Showing future appointments within next 150 days and meeting all other requirements       The patient was identified by name and date of birth  Sangeetha Bolanos was informed that this is a telemedicine visit and that the visit is being conducted through the [employer]  She agrees to proceed     My office door was closed  No one else was in the room  She acknowledged consent and understanding of privacy and security of the video platform  The patient has agreed to participate and understands they can discontinue the visit at any time  Patient is aware this is a billable service  Subjective  Sangeetha Bolanos is a 65 y o  female presents for virtual visit requesting help for stress and anxiety going through her divorce and she is trying to still work and function at her job  Stress and anxiety situational from going through a divorce now    Anxiety  Symptoms include decreased concentration and nervous/anxious behavior  Patient reports no chest pain, dizziness, nausea, palpitations or shortness of breath              Past Medical History:   Diagnosis Date   • Arthritis     Chronic   • Depression     Chronic   • Eye disease     EBMD   • Heart murmur     Chronic   • Hypertension    • Obesity     Chronic   • Psychiatric disorder     depression   • Visual impairment 2020    Chronic       Past Surgical History:   Procedure Laterality Date   • BACK SURGERY     •  SECTION  1985,1988    2   • JOINT REPLACEMENT      BKR   • REPLACEMENT TOTAL KNEE Bilateral    • SPINE SURGERY  2019    L4-L5 fusion       Current Outpatient Medications   Medication Sig Dispense Refill   • buPROPion (WELLBUTRIN XL) 300 mg 24 hr tablet Take 1 tablet (300 mg total) by mouth every morning 90 tablet 0   • hydrochlorothiazide (HYDRODIURIL) 50 mg tablet Take 1 tablet (50 mg total) by mouth daily 90 tablet 1   • multivitamin (THERAGRAN) TABS Take 1 tablet by mouth daily     • valsartan (DIOVAN) 320 MG tablet Take 1 tablet (320 mg total) by mouth daily 90 tablet 3   • zolpidem (AMBIEN) 10 mg tablet Take 1 tablet (10 mg total) by mouth daily at bedtime as needed for sleep 30 tablet 0     No current facility-administered medications for this visit  Allergies   Allergen Reactions   • Azithromycin Nausea Only     Category: sensitivity;    • Erythromycin        Review of Systems   Constitutional: Negative for chills, fatigue and fever  HENT: Negative for congestion, nosebleeds, rhinorrhea, sinus pressure and sore throat  Eyes: Negative for discharge and redness  Respiratory: Negative for cough and shortness of breath  Cardiovascular: Negative for chest pain, palpitations and leg swelling  Gastrointestinal: Negative for abdominal pain, blood in stool and nausea  Endocrine: Negative for cold intolerance, heat intolerance and polyuria  Genitourinary: Negative for dysuria and frequency  Musculoskeletal: Negative for arthralgias, back pain and myalgias  Skin: Negative for rash  Neurological: Negative for dizziness, weakness and headaches  Hematological: Negative for adenopathy  Psychiatric/Behavioral: Positive for decreased concentration, dysphoric mood and sleep disturbance  Negative for behavioral problems  The patient is nervous/anxious  Video Exam    There were no vitals filed for this visit  Physical Exam  Vitals and nursing note reviewed  Constitutional:       General: She is not in acute distress  Appearance: She is well-developed  HENT:      Head: Normocephalic and atraumatic  Right Ear: External ear normal       Left Ear: External ear normal       Nose: Nose normal       Mouth/Throat:      Pharynx: No oropharyngeal exudate  Eyes:      General: No scleral icterus  Right eye: No discharge  Left eye: No discharge  Conjunctiva/sclera: Conjunctivae normal       Pupils: Pupils are equal, round, and reactive to light     Neck:      Thyroid: No thyromegaly  Vascular: No JVD  Cardiovascular:      Rate and Rhythm: Normal rate and regular rhythm  Heart sounds: Normal heart sounds  No murmur heard  Pulmonary:      Effort: Pulmonary effort is normal       Breath sounds: No wheezing or rales  Chest:      Chest wall: No tenderness  Abdominal:      General: Bowel sounds are normal  There is no distension  Palpations: Abdomen is soft  There is no mass  Tenderness: There is no abdominal tenderness  Musculoskeletal:         General: No tenderness or deformity  Normal range of motion  Cervical back: Normal range of motion  Lymphadenopathy:      Cervical: No cervical adenopathy  Skin:     General: Skin is warm and dry  Capillary Refill: Capillary refill takes less than 2 seconds  Findings: No rash  Neurological:      General: No focal deficit present  Mental Status: She is alert and oriented to person, place, and time  Mental status is at baseline  Cranial Nerves: No cranial nerve deficit  Coordination: Coordination normal       Deep Tendon Reflexes: Reflexes are normal and symmetric  Reflexes normal    Psychiatric:         Mood and Affect: Mood normal          Behavior: Behavior normal          Thought Content:  Thought content normal          Judgment: Judgment normal           I spent 20 minutes directly with the patient during this visit

## 2023-03-29 NOTE — ASSESSMENT & PLAN NOTE
Major depression relapse with going through divorce now she is trying to maintain stable emotional control while functioning at her job continue with Wellbutrin follow-up in 2 weeks continue to follow-up with therapy and counseling appointment will be upcoming

## 2023-03-29 NOTE — ASSESSMENT & PLAN NOTE
Patient is going through a divorce and trying to work full-time as well and her concentration and emotions are affected by her overall condition she is dealing with different emotions of anger stress frustration and sadness and having difficulty functioning and concentrating so at this point we discussed addition of low-dose clonazepam as she had taken Valium in the past for other problems but I would like to use a low-dose clonazepam for now and follow-up with her closely she also will be speaking to her therapist and counselor

## 2023-05-03 DIAGNOSIS — F33.2 SEVERE EPISODE OF RECURRENT MAJOR DEPRESSIVE DISORDER, WITHOUT PSYCHOTIC FEATURES (HCC): ICD-10-CM

## 2023-05-03 RX ORDER — BUPROPION HYDROCHLORIDE 300 MG/1
300 TABLET ORAL EVERY MORNING
Qty: 90 TABLET | Refills: 0 | Status: SHIPPED | OUTPATIENT
Start: 2023-05-03

## 2023-05-25 ENCOUNTER — OFFICE VISIT (OUTPATIENT)
Dept: URGENT CARE | Facility: CLINIC | Age: 58
End: 2023-05-25

## 2023-05-25 ENCOUNTER — APPOINTMENT (OUTPATIENT)
Dept: RADIOLOGY | Facility: CLINIC | Age: 58
End: 2023-05-25

## 2023-05-25 VITALS
TEMPERATURE: 98.2 F | RESPIRATION RATE: 16 BRPM | DIASTOLIC BLOOD PRESSURE: 77 MMHG | WEIGHT: 284 LBS | SYSTOLIC BLOOD PRESSURE: 171 MMHG | HEIGHT: 64 IN | HEART RATE: 88 BPM | OXYGEN SATURATION: 96 % | BODY MASS INDEX: 48.49 KG/M2

## 2023-05-25 DIAGNOSIS — M25.561 CHRONIC PAIN OF RIGHT KNEE: Primary | ICD-10-CM

## 2023-05-25 DIAGNOSIS — M25.561 CHRONIC PAIN OF RIGHT KNEE: ICD-10-CM

## 2023-05-25 DIAGNOSIS — G89.29 CHRONIC PAIN OF RIGHT KNEE: Primary | ICD-10-CM

## 2023-05-25 DIAGNOSIS — G89.29 CHRONIC PAIN OF RIGHT KNEE: ICD-10-CM

## 2023-05-25 RX ORDER — METHOCARBAMOL 500 MG/1
500 TABLET, FILM COATED ORAL 4 TIMES DAILY
Qty: 8 TABLET | Refills: 0 | Status: SHIPPED | OUTPATIENT
Start: 2023-05-25

## 2023-05-25 RX ORDER — KETOROLAC TROMETHAMINE 10 MG/1
10 TABLET, FILM COATED ORAL EVERY 8 HOURS
Qty: 15 TABLET | Refills: 0 | Status: SHIPPED | OUTPATIENT
Start: 2023-05-25 | End: 2023-05-30

## 2023-05-25 NOTE — PROGRESS NOTES
[facility]        NAME: Vikash Mendes is a 63 y o  female  : 1965    MRN: [id number]  DATE: May 25, 2023  TIME: 7:15 PM    Assessment and Plan   Chronic pain of right knee [M25 561, G89 29]  1  Chronic pain of right knee  XR knee 3 vw right non injury    ketorolac (TORADOL) 10 mg tablet    methocarbamol (ROBAXIN) 500 mg tablet            Patient Instructions       Follow up with PCP in 3-5 days  Proceed to  ER if symptoms worsen  Chief Complaint     Chief Complaint   Patient presents with   • Knee Pain     Right knee pain it is artificial started yesterday has nerve damage in her right foot  Can't bend it  History of Present Illness       Patient is a 64 y/o/f presenting to [facility] with right [facility] pain  Patient has a hx of bilateral knee replacements from   Pt also w previous spinal surgery and injuries resulting in lower extremity pain and some numbness  Patient reports this morning waking up with excruciating pain to right kinee  No known injury  Pt did take Ibuprofen which took the edge off of the pain  Knee Pain   The incident occurred 6 to 12 hours ago  There was no injury mechanism  The quality of the pain is described as aching  Associated symptoms include a loss of motion (decreased motion)  Pertinent negatives include no inability to bear weight, numbness or tingling  Review of Systems   Review of Systems   Constitutional: Negative for chills and fever  HENT: Negative for ear pain and sore throat  Eyes: Negative for pain and visual disturbance  Respiratory: Negative for cough and shortness of breath  Cardiovascular: Negative for chest pain and palpitations  Gastrointestinal: Negative for abdominal pain and vomiting  Genitourinary: Negative for dysuria and hematuria  Musculoskeletal: Positive for arthralgias (Right knee pain)  Negative for back pain  Skin: Negative for color change and rash     Neurological: Negative for tingling, seizures, syncope and numbness  All other systems reviewed and are negative          Current Medications       Current Outpatient Medications:   •  buPROPion (WELLBUTRIN XL) 300 mg 24 hr tablet, TAKE 1 TABLET (300 MG TOTAL) BY MOUTH EVERY MORNING , Disp: 90 tablet, Rfl: 0  •  hydrochlorothiazide (HYDRODIURIL) 50 mg tablet, Take 1 tablet (50 mg total) by mouth daily, Disp: 90 tablet, Rfl: 1  •  ketorolac (TORADOL) 10 mg tablet, Take 1 tablet (10 mg total) by mouth every 8 (eight) hours for 5 days, Disp: 15 tablet, Rfl: 0  •  methocarbamol (ROBAXIN) 500 mg tablet, Take 1 tablet (500 mg total) by mouth 4 (four) times a day, Disp: 8 tablet, Rfl: 0  •  multivitamin (THERAGRAN) TABS, Take 1 tablet by mouth daily, Disp: , Rfl:   •  valsartan (DIOVAN) 320 MG tablet, Take 1 tablet (320 mg total) by mouth daily, Disp: 90 tablet, Rfl: 3  •  zolpidem (AMBIEN) 10 mg tablet, Take 1 tablet (10 mg total) by mouth daily at bedtime as needed for sleep, Disp: 30 tablet, Rfl: 0    Current Allergies     Allergies as of 2023 - Reviewed 2023   Allergen Reaction Noted   • Azithromycin Nausea Only 2016   • Erythromycin  2016            The following portions of the patient's history were reviewed and updated as appropriate: allergies, current medications, past family history, past medical history, past social history, past surgical history and problem list      Past Medical History:   Diagnosis Date   • Arthritis     Chronic   • Depression     Chronic   • Eye disease     EBMD   • Heart murmur     Chronic   • Hypertension    • Obesity     Chronic   • Psychiatric disorder     depression   • Visual impairment 2020    Chronic       Past Surgical History:   Procedure Laterality Date   • BACK SURGERY     •  SECTION  1985,1988    2   • JOINT REPLACEMENT      BKR   • REPLACEMENT TOTAL KNEE Bilateral    • SPINE SURGERY      L4-L5 fusion       Family History   Problem Relation Age of Onset   • Depression "Mother    • Hyperlipidemia Mother    • Arthritis Mother         Psoriatic   • Autoimmune disease Mother    • Cancer Mother         Skin face   • Anxiety disorder Mother    • OCD Mother    • Alcohol abuse Father            • Hypertension Father    • Hyperlipidemia Father    • Substance Abuse Daughter         In recovery since    • Asthma Daughter         Exercise induced         Medications have been verified  Objective   BP (!) 171/77   Pulse 88   Temp 98 2 °F (36 8 °C)   Resp 16   Ht 5' 4\" (1 626 m)   Wt 129 kg (284 lb)   SpO2 96%   BMI 48 75 kg/m²   No LMP recorded  Patient is postmenopausal        Physical Exam     Physical Exam  Constitutional:       Appearance: Normal appearance  HENT:      Head: Normocephalic and atraumatic  Nose: Nose normal       Mouth/Throat:      Mouth: Mucous membranes are moist    Eyes:      Extraocular Movements: Extraocular movements intact  Conjunctiva/sclera: Conjunctivae normal       Pupils: Pupils are equal, round, and reactive to light  Cardiovascular:      Rate and Rhythm: Normal rate  Pulmonary:      Effort: Pulmonary effort is normal    Musculoskeletal:         General: Normal range of motion  Cervical back: Normal range of motion and neck supple  Legs:    Skin:     General: Skin is warm and dry  Capillary Refill: Capillary refill takes less than 2 seconds  Neurological:      General: No focal deficit present  Mental Status: She is alert and oriented to person, place, and time     Psychiatric:         Mood and Affect: Mood normal          Behavior: Behavior normal                    "

## 2023-07-13 DIAGNOSIS — F51.04 PSYCHOPHYSIOLOGICAL INSOMNIA: ICD-10-CM

## 2023-07-14 RX ORDER — ZOLPIDEM TARTRATE 10 MG/1
10 TABLET ORAL
Qty: 30 TABLET | Refills: 0 | Status: SHIPPED | OUTPATIENT
Start: 2023-07-14

## 2023-07-22 ENCOUNTER — APPOINTMENT (OUTPATIENT)
Dept: LAB | Facility: CLINIC | Age: 58
End: 2023-07-22
Payer: COMMERCIAL

## 2023-07-22 DIAGNOSIS — Z00.00 ANNUAL PHYSICAL EXAM: ICD-10-CM

## 2023-07-22 LAB
ALBUMIN SERPL BCP-MCNC: 3.5 G/DL (ref 3.5–5)
ALP SERPL-CCNC: 121 U/L (ref 46–116)
ALT SERPL W P-5'-P-CCNC: 23 U/L (ref 12–78)
ANION GAP SERPL CALCULATED.3IONS-SCNC: 8 MMOL/L
AST SERPL W P-5'-P-CCNC: 23 U/L (ref 5–45)
BASOPHILS # BLD AUTO: 0.03 THOUSANDS/ÂΜL (ref 0–0.1)
BASOPHILS NFR BLD AUTO: 1 % (ref 0–1)
BILIRUB SERPL-MCNC: 0.64 MG/DL (ref 0.2–1)
BUN SERPL-MCNC: 9 MG/DL (ref 5–25)
CALCIUM SERPL-MCNC: 9.6 MG/DL (ref 8.3–10.1)
CHLORIDE SERPL-SCNC: 95 MMOL/L (ref 96–108)
CHOLEST SERPL-MCNC: 187 MG/DL
CO2 SERPL-SCNC: 27 MMOL/L (ref 21–32)
CREAT SERPL-MCNC: 0.72 MG/DL (ref 0.6–1.3)
EOSINOPHIL # BLD AUTO: 0.09 THOUSAND/ÂΜL (ref 0–0.61)
EOSINOPHIL NFR BLD AUTO: 3 % (ref 0–6)
ERYTHROCYTE [DISTWIDTH] IN BLOOD BY AUTOMATED COUNT: 12.4 % (ref 11.6–15.1)
GFR SERPL CREATININE-BSD FRML MDRD: 93 ML/MIN/1.73SQ M
GLUCOSE P FAST SERPL-MCNC: 92 MG/DL (ref 65–99)
HCT VFR BLD AUTO: 39.9 % (ref 34.8–46.1)
HDLC SERPL-MCNC: 72 MG/DL
HGB BLD-MCNC: 13.8 G/DL (ref 11.5–15.4)
IMM GRANULOCYTES # BLD AUTO: 0.01 THOUSAND/UL (ref 0–0.2)
IMM GRANULOCYTES NFR BLD AUTO: 0 % (ref 0–2)
LDLC SERPL CALC-MCNC: 101 MG/DL (ref 0–100)
LYMPHOCYTES # BLD AUTO: 0.87 THOUSANDS/ÂΜL (ref 0.6–4.47)
LYMPHOCYTES NFR BLD AUTO: 26 % (ref 14–44)
MCH RBC QN AUTO: 30.5 PG (ref 26.8–34.3)
MCHC RBC AUTO-ENTMCNC: 34.6 G/DL (ref 31.4–37.4)
MCV RBC AUTO: 88 FL (ref 82–98)
MONOCYTES # BLD AUTO: 0.46 THOUSAND/ÂΜL (ref 0.17–1.22)
MONOCYTES NFR BLD AUTO: 14 % (ref 4–12)
NEUTROPHILS # BLD AUTO: 1.92 THOUSANDS/ÂΜL (ref 1.85–7.62)
NEUTS SEG NFR BLD AUTO: 56 % (ref 43–75)
NONHDLC SERPL-MCNC: 115 MG/DL
NRBC BLD AUTO-RTO: 0 /100 WBCS
PLATELET # BLD AUTO: 317 THOUSANDS/UL (ref 149–390)
PMV BLD AUTO: 10.7 FL (ref 8.9–12.7)
POTASSIUM SERPL-SCNC: 3.8 MMOL/L (ref 3.5–5.3)
PROT SERPL-MCNC: 7.1 G/DL (ref 6.4–8.4)
RBC # BLD AUTO: 4.53 MILLION/UL (ref 3.81–5.12)
SODIUM SERPL-SCNC: 130 MMOL/L (ref 135–147)
TRIGL SERPL-MCNC: 71 MG/DL
TSH SERPL DL<=0.05 MIU/L-ACNC: 2.56 UIU/ML (ref 0.45–4.5)
WBC # BLD AUTO: 3.38 THOUSAND/UL (ref 4.31–10.16)

## 2023-07-22 PROCEDURE — 36415 COLL VENOUS BLD VENIPUNCTURE: CPT

## 2023-07-22 PROCEDURE — 85025 COMPLETE CBC W/AUTO DIFF WBC: CPT

## 2023-07-22 PROCEDURE — 84443 ASSAY THYROID STIM HORMONE: CPT

## 2023-07-22 PROCEDURE — 80061 LIPID PANEL: CPT

## 2023-07-22 PROCEDURE — 80053 COMPREHEN METABOLIC PANEL: CPT

## 2023-07-24 ENCOUNTER — OFFICE VISIT (OUTPATIENT)
Dept: FAMILY MEDICINE CLINIC | Facility: CLINIC | Age: 58
End: 2023-07-24
Payer: COMMERCIAL

## 2023-07-24 VITALS
OXYGEN SATURATION: 98 % | WEIGHT: 284.4 LBS | SYSTOLIC BLOOD PRESSURE: 120 MMHG | HEART RATE: 88 BPM | DIASTOLIC BLOOD PRESSURE: 70 MMHG | TEMPERATURE: 97.9 F | HEIGHT: 64 IN | RESPIRATION RATE: 18 BRPM | BODY MASS INDEX: 48.55 KG/M2

## 2023-07-24 DIAGNOSIS — I10 ESSENTIAL HYPERTENSION: Primary | ICD-10-CM

## 2023-07-24 DIAGNOSIS — F33.42 RECURRENT MAJOR DEPRESSIVE DISORDER, IN FULL REMISSION (HCC): ICD-10-CM

## 2023-07-24 DIAGNOSIS — M15.9 GENERALIZED OSTEOARTHRITIS: ICD-10-CM

## 2023-07-24 DIAGNOSIS — E66.01 MORBID OBESITY (HCC): ICD-10-CM

## 2023-07-24 DIAGNOSIS — M80.00XD AGE-RELATED OSTEOPOROSIS WITH CURRENT PATHOLOGICAL FRACTURE WITH ROUTINE HEALING: ICD-10-CM

## 2023-07-24 DIAGNOSIS — E78.2 MIXED HYPERLIPIDEMIA: ICD-10-CM

## 2023-07-24 DIAGNOSIS — M43.16 SPONDYLOLISTHESIS AT L4-L5 LEVEL: ICD-10-CM

## 2023-07-24 DIAGNOSIS — H00.15 CHALAZION LEFT LOWER EYELID: ICD-10-CM

## 2023-07-24 PROCEDURE — 99214 OFFICE O/P EST MOD 30 MIN: CPT | Performed by: FAMILY MEDICINE

## 2023-07-24 RX ORDER — MELOXICAM 15 MG/1
15 TABLET ORAL DAILY PRN
Qty: 30 TABLET | Refills: 3 | Status: SHIPPED | OUTPATIENT
Start: 2023-07-24

## 2023-07-24 NOTE — PROGRESS NOTES
Assessment/Plan:       Problem List Items Addressed This Visit        Cardiovascular and Mediastinum    Essential hypertension - Primary     Hypertension stable control continue on same medication regimen of valsartan along with hydrochlorothiazide            Musculoskeletal and Integument    Spondylolisthesis at L4-L5 level     Longstanding low back pain with spondylolisthesis at L4-5 continue with exercise core strengthening and stretching program         Age-related osteoporosis with current pathological fracture with routine healing     Follow-up DEXA scan every 2 years continue calcium vitamin D         Generalized osteoarthritis     Start Meloxicam now         Relevant Medications    meloxicam (MOBIC) 15 mg tablet    Chalazion left lower eyelid     Improving slowly patient will continue with moist compresses if not resolving she will notify me in 3 days            Other    Morbid obesity ([address])     Stress over divorce process now. Joint pains worse. Recurrent major depressive disorder, in full remission ([address])     Continue with Wellbutrin 300 mg daily patient stable sleep patterns are good she does use Ambien at times for bed        Other Visit Diagnoses     Mixed hyperlipidemia        Relevant Orders    CBC and differential    Comprehensive metabolic panel    Lipid panel    TSH, 3rd generation with Free T4 reflex            Subjective:      Patient ID: Vicente Campbell is a 63 y.o. female. Patient presents for follow-up evaluation complains of another stye in her eye      The following portions of the patient's history were reviewed and updated as appropriate: allergies, current medications, past family history, past medical history, past social history, past surgical history and problem list.    Review of Systems   Constitutional: Negative for chills, fatigue and fever. HENT: Negative for congestion, nosebleeds, rhinorrhea, sinus pressure and sore throat. Eyes: Negative for discharge and redness. Respiratory: Negative for cough and shortness of breath. Cardiovascular: Negative for chest pain, palpitations and leg swelling. Gastrointestinal: Negative for abdominal pain, blood in stool and nausea. Endocrine: Negative for cold intolerance, heat intolerance and polyuria. Genitourinary: Negative for dysuria and frequency. Musculoskeletal: Positive for arthralgias. Negative for back pain and myalgias. Skin: Negative for rash. Neurological: Negative for dizziness, weakness and headaches. Hematological: Negative for adenopathy. Psychiatric/Behavioral: Negative for behavioral problems and sleep disturbance. The patient is not nervous/anxious. Stress over divorce proceedings         Objective:      /70 (BP Location: Left arm, Patient Position: Sitting, Cuff Size: Standard)   Pulse 88   Temp 97.9 °F (36.6 °C) (Tympanic)   Resp 18   Ht 5' 4" (1.626 m)   Wt 129 kg (284 lb 6.4 oz)   SpO2 98%   BMI 48.82 kg/m²        Physical Exam  Vitals and nursing note reviewed. Constitutional:       General: She is not in acute distress. Appearance: Normal appearance. She is well-developed. She is obese. HENT:      Head: Normocephalic and atraumatic. Right Ear: Tympanic membrane, ear canal and external ear normal.      Left Ear: Tympanic membrane, ear canal and external ear normal.      Nose: Nose normal.      Mouth/Throat:      Mouth: Mucous membranes are moist.      Pharynx: No oropharyngeal exudate. Eyes:      General: No scleral icterus. Right eye: No discharge. Left eye: No discharge. Conjunctiva/sclera: Conjunctivae normal.      Pupils: Pupils are equal, round, and reactive to light. Neck:      Thyroid: No thyromegaly. Vascular: No JVD. Cardiovascular:      Rate and Rhythm: Normal rate and regular rhythm. Pulses: Normal pulses. Heart sounds: Normal heart sounds. No murmur heard.   Pulmonary:      Effort: Pulmonary effort is normal. Breath sounds: No wheezing or rales. Chest:      Chest wall: No tenderness. Abdominal:      General: Bowel sounds are normal. There is no distension. Palpations: Abdomen is soft. There is no mass. Tenderness: There is no abdominal tenderness. Musculoskeletal:         General: No tenderness or deformity. Normal range of motion. Cervical back: Normal range of motion. Lymphadenopathy:      Cervical: No cervical adenopathy. Skin:     General: Skin is warm and dry. Capillary Refill: Capillary refill takes less than 2 seconds. Findings: No rash. Neurological:      General: No focal deficit present. Mental Status: She is alert and oriented to person, place, and time. Mental status is at baseline. Cranial Nerves: No cranial nerve deficit. Coordination: Coordination normal.      Deep Tendon Reflexes: Reflexes are normal and symmetric. Reflexes normal.   Psychiatric:         Mood and Affect: Mood normal.         Behavior: Behavior normal.         Thought Content: Thought content normal.         Judgment: Judgment normal.          Data:    Laboratory Results: I have personally reviewed the pertinent laboratory results/reports   Radiology/Other Diagnostic Testing Results: I have personally reviewed pertinent reports.        Lab Results   Component Value Date    WBC 3.38 (L) 07/22/2023    HGB 13.8 07/22/2023    HCT 39.9 07/22/2023    MCV 88 07/22/2023     07/22/2023     Lab Results   Component Value Date    K 3.8 07/22/2023    CL 95 (L) 07/22/2023    CO2 27 07/22/2023    BUN 9 07/22/2023    CREATININE 0.72 07/22/2023    GLUF 92 07/22/2023    CALCIUM 9.6 07/22/2023    AST 23 07/22/2023    ALT 23 07/22/2023    ALKPHOS 121 (H) 07/22/2023    EGFR 93 07/22/2023     Lab Results   Component Value Date    CHOLESTEROL 187 07/22/2023    CHOLESTEROL 164 09/10/2021    CHOLESTEROL 193 10/14/2020     Lab Results   Component Value Date    HDL 72 07/22/2023    HDL 80 09/10/2021    HDL 78 10/14/2020     Lab Results   Component Value Date    LDLCALC 101 (H) 07/22/2023    LDLCALC 71 09/10/2021    LDLCALC 102 (H) 10/14/2020     Lab Results   Component Value Date    TRIG 71 07/22/2023    TRIG 63 09/10/2021    TRIG 63 10/14/2020     No results found for: "[place]"  Lab Results   Component Value Date    YEI4RVXDNRIS 2.561 07/22/2023     Lab Results   Component Value Date    HGBA1C 4.9 09/10/2021     No results found for: "PSA"    James Bennett DO

## 2023-07-24 NOTE — ASSESSMENT & PLAN NOTE
Continue with Wellbutrin 300 mg daily patient stable sleep patterns are good she does use Ambien at times for bed

## 2023-07-24 NOTE — PROGRESS NOTES
BMI Counseling: Body mass index is 48.82 kg/m². The BMI is above normal. Nutrition recommendations include reducing portion sizes, decreasing overall calorie intake, 3-5 servings of fruits/vegetables daily, consuming healthier snacks, decreasing soda and/or juice intake, moderation in carbohydrate intake, reducing intake of saturated fat and trans fat and reducing intake of cholesterol. Exercise recommendations include exercising 3-5 times per week.

## 2023-07-24 NOTE — ASSESSMENT & PLAN NOTE
Improving slowly patient will continue with moist compresses if not resolving she will notify me in 3 days

## 2023-07-24 NOTE — ASSESSMENT & PLAN NOTE
Longstanding low back pain with spondylolisthesis at L4-5 continue with exercise core strengthening and stretching program

## 2023-07-24 NOTE — ASSESSMENT & PLAN NOTE
Hypertension stable control continue on same medication regimen of valsartan along with hydrochlorothiazide

## 2023-08-04 DIAGNOSIS — F33.2 SEVERE EPISODE OF RECURRENT MAJOR DEPRESSIVE DISORDER, WITHOUT PSYCHOTIC FEATURES (HCC): ICD-10-CM

## 2023-08-04 RX ORDER — BUPROPION HYDROCHLORIDE 300 MG/1
300 TABLET ORAL EVERY MORNING
Qty: 90 TABLET | Refills: 0 | Status: SHIPPED | OUTPATIENT
Start: 2023-08-04

## 2023-08-16 DIAGNOSIS — R26.89 POOR BALANCE: Primary | ICD-10-CM

## 2023-08-23 DIAGNOSIS — M15.9 GENERALIZED OSTEOARTHRITIS: ICD-10-CM

## 2023-08-23 DIAGNOSIS — F51.04 PSYCHOPHYSIOLOGICAL INSOMNIA: ICD-10-CM

## 2023-08-24 RX ORDER — ZOLPIDEM TARTRATE 10 MG/1
10 TABLET ORAL
Qty: 30 TABLET | Refills: 0 | Status: SHIPPED | OUTPATIENT
Start: 2023-08-24

## 2023-08-24 RX ORDER — MELOXICAM 15 MG/1
15 TABLET ORAL DAILY PRN
Qty: 30 TABLET | Refills: 0 | Status: SHIPPED | OUTPATIENT
Start: 2023-08-24

## 2023-08-31 NOTE — PROGRESS NOTES
PT Evaluation     Today's date: 2023  Patient name: Lukas Chatman  : 1965  MRN: [id number]  Referring provider: Anabel Alvarez DO  Dx:   Encounter Diagnosis     ICD-10-CM    1. Poor balance  R26.89       2.  Generalized weakness  R53.1           Start Time: 1340  Stop Time: 1441  Total time in clinic (min): 61 minutes    Assessment/Plan    Subjective Evaluation    History of Present Illness  Mechanism of injury: Patient           Recurrent probem    Patient Goals  Patient goals for therapy: improved balance, decreased pain, increased strength, independence with ADLs/IADLs and increased motion  Patient goal: improve her balance  Pain  Current pain ratin  At best pain ratin  At worst pain ratin  Location: right knee  Relieving factors: medications, change in position and rest  Aggravating factors: walking and standing  Progression: no change    Social Support  Steps to enter house: yes  3  Stairs in house: yes   Lives in: multiple-level home  Lives with: alone    Employment status: working (sitting at desk)        Objective           Precautions: HTN, B TKR , L4-5 fusion 2019, OA  Daily Treatment Diary:    Manuals                                                                 Neuro Re-Ed             Forw/back tandem 2x HHA                                                                                          Ther Ex             Hip adduction Ball 30x            Hip abduction Blue TB 30x            Nustep for strength 6' L4                                                                             Ther Activity                                       Gait Training                                       Modalities behavior modification, body mechanics training, flexibility, functional ROM exercises, home exercise program, IADL retraining, joint mobilization, manual therapy, neuromuscular re-education, patient education, postural training, strengthening, stretching, therapeutic activities, therapeutic exercise and balance/weight bearing training  Frequency: 2-3x week. Duration in weeks: 12  Plan of Care beginning date: 2023  Plan of Care expiration date: 2023  Treatment plan discussed with: patient        Subjective Evaluation    History of Present Illness  Mechanism of injury: Patient reports she is a fall risk, she has been having balance impairment for some time. She stated after her LB surgery she got drop foot on right. BTK in  with current right knee pains. She had a few falls and her confidence is affecting her balance. Recurrent probem    Patient Goals  Patient goals for therapy: improved balance, decreased pain, increased strength, independence with ADLs/IADLs and increased motion  Patient goal: improve her balance  Pain  Current pain ratin  At best pain ratin  At worst pain ratin  Location: right knee  Relieving factors: medications, change in position and rest  Aggravating factors: walking and standing  Progression: no change    Social Support  Steps to enter house: yes  3  Stairs in house: yes   Lives in: multiple-level home  Lives with: alone    Employment status: working (sitting at desk)    Diagnostic Tests  No diagnostic tests performed        Objective     Static Posture     Thoracic Spine  Hyperkyphosis. Lumbar Spine   Increased lordosis. Knee   Knee (Left): Recurvatum. Knee (Right): Recurvatum.      Comments  Increased BMI    Postural Observations  Seated posture: fair  Standing posture: fair        Neurological Testing     Sensation     Lumbar   Left   Intact: light touch    Right   Intact: light touch    Active Range of Motion     Lumbar   Flexion: Active lumbar flexion: tips to prox shin. Extension: 8 degrees  with pain Restriction level: minimal  Left lateral flexion:  WFL  Right lateral flexion:  [facility]    Strength/Myotome Testing     Lumbar   Left   Heel walk: normal  Toe walk: normal    Left Hip   Planes of Motion   Flexion: 4  Extension: 4  Abduction: 4  Adduction: 4+    Right Hip   Planes of Motion   Flexion: 4  Extension: 4  Abduction: 4  Adduction: 4+    Left Knee   Flexion: 4+  Extension: 5    Right Knee   Flexion: 4+  Extension: 5    Left Ankle/Foot   Dorsiflexion: 5  Plantar flexion: 5    Right Ankle/Foot   Dorsiflexion: 5  Plantar flexion: 5    Ambulation   Weight-Bearing Status   Assistive device used: none    Ambulation: Level Surfaces   Ambulation without assistive device: independent    Ambulation: Stairs   Pattern: non-reciprocal  Railings: two rails  Pattern: non-reciprocal  Railings: two rails    Functional Assessment        Single Leg Stance   Left: 3 seconds  Right: 1 seconds    Comments  TU seconds with no AD  5x STS: 17 seconds w/ UE's    General Comments:      Knee Comments  B knee WFL for ROM. Hx BTK replacements. C/o pain in right knee    Ankle/Foot Comments   No observable "drop foot" noted. Patient states worse when she is tired at end of day.               Precautions: HTN, B TKR 2012, L4-5 fusion 2019, OA  Daily Treatment Diary:    Manuals                                                                 Neuro Re-Ed             Forw/back tandem 2x HHA            Side/side             pods                                                                 Ther Ex             Hip adduction Ball 30x            Hip abduction Blue TB 30x            Nustep for strength 6' L4                                                                             Ther Activity                                       Gait Training                                       Modalities

## 2023-09-01 ENCOUNTER — EVALUATION (OUTPATIENT)
Dept: PHYSICAL THERAPY | Age: 58
End: 2023-09-01
Payer: COMMERCIAL

## 2023-09-01 DIAGNOSIS — R53.1 GENERALIZED WEAKNESS: ICD-10-CM

## 2023-09-01 DIAGNOSIS — R26.89 POOR BALANCE: Primary | ICD-10-CM

## 2023-09-01 PROCEDURE — 97110 THERAPEUTIC EXERCISES: CPT | Performed by: PHYSICAL THERAPIST

## 2023-09-01 PROCEDURE — 97162 PT EVAL MOD COMPLEX 30 MIN: CPT | Performed by: PHYSICAL THERAPIST

## 2023-09-01 PROCEDURE — 97112 NEUROMUSCULAR REEDUCATION: CPT | Performed by: PHYSICAL THERAPIST

## 2023-09-07 ENCOUNTER — APPOINTMENT (OUTPATIENT)
Dept: PHYSICAL THERAPY | Age: 58
End: 2023-09-07
Payer: COMMERCIAL

## 2023-09-09 DIAGNOSIS — I10 ESSENTIAL HYPERTENSION: ICD-10-CM

## 2023-09-11 RX ORDER — HYDROCHLOROTHIAZIDE 50 MG/1
50 TABLET ORAL DAILY
Qty: 90 TABLET | Refills: 1 | Status: SHIPPED | OUTPATIENT
Start: 2023-09-11

## 2023-09-12 ENCOUNTER — OFFICE VISIT (OUTPATIENT)
Dept: PHYSICAL THERAPY | Age: 58
End: 2023-09-12
Payer: COMMERCIAL

## 2023-09-12 DIAGNOSIS — R26.89 POOR BALANCE: Primary | ICD-10-CM

## 2023-09-12 DIAGNOSIS — R53.1 GENERALIZED WEAKNESS: ICD-10-CM

## 2023-09-12 PROCEDURE — 97112 NEUROMUSCULAR REEDUCATION: CPT | Performed by: PHYSICAL THERAPIST

## 2023-09-12 PROCEDURE — 97110 THERAPEUTIC EXERCISES: CPT | Performed by: PHYSICAL THERAPIST

## 2023-09-12 NOTE — PROGRESS NOTES
Daily Note     Today's date: 2023  Patient name: Andressa Santacruz  : 1965  MRN: [id number]  Referring provider: Doe Antoine DO  Dx:   Encounter Diagnosis     ICD-10-CM    1. Poor balance  R26.89       2. Generalized weakness  R53.1           Start Time: 1346  Stop Time: 1435  Total time in clinic (min): 49 minutes    Subjective: Patient reports her right knee hurts. She was not sore after first visit. Objective: See treatment diary below      Assessment: Tolerated treatment well. Patient would benefit from continued PT. Patient did better on stairs, did well with curb, and with balance exercises today. C/o right knee pain however she does not favor the right knee on steps. Plan: Continue per plan of care.       Precautions: HTN, B TKR , L4-5 fusion 2019, OA  Daily Treatment Diary:    Manuals                                                                Neuro Re-Ed             Forw/back tandem 2x HHA 2x           Side/side  2x HHA           pods                                                                 Ther Ex             Hip adduction Ball 30x 30x           Hip abduction Blue TB 30x 30x           Nustep for strength 6' L4 10' L4           Stand heel raises  20x           Hip abduction standing  10x ea           Stand ball press  5" 20x           multidus  5" 10xea red web           TB rows, shld extension  Red 20x                                     Ther Activity             Step ups  4" 10x ea                        Gait Training             Steps, curbs  5'                        Modalities

## 2023-09-15 ENCOUNTER — OFFICE VISIT (OUTPATIENT)
Dept: PHYSICAL THERAPY | Age: 58
End: 2023-09-15
Payer: COMMERCIAL

## 2023-09-15 DIAGNOSIS — R26.89 POOR BALANCE: Primary | ICD-10-CM

## 2023-09-15 DIAGNOSIS — R53.1 GENERALIZED WEAKNESS: ICD-10-CM

## 2023-09-15 PROCEDURE — 97112 NEUROMUSCULAR REEDUCATION: CPT

## 2023-09-15 PROCEDURE — 97110 THERAPEUTIC EXERCISES: CPT

## 2023-09-15 NOTE — PROGRESS NOTES
Daily Note     Today's date: 9/15/2023  Patient name: Leoncio Lugo  : 1965  MRN: [id number]  Referring provider: Tita Phillips DO  Dx:   Encounter Diagnosis     ICD-10-CM    1. Poor balance  R26.89       2. Generalized weakness  R53.1                        Subjective: Notes some muscular soreness following LV. Objective: See treatment diary below      Assessment: Antalgic gait, WBOS, decreased abisai and step length, lateral trunk lean present. Weakness noted in glute and external rotators of hip. Emphasis on heel drive with step ups and lateral step ups. VC for core bracing t/o program. Patient offers no complaints post session, would benefit from continued PT. Plan: Continue per plan of care.       Precautions: HTN, B TKR , L4-5 fusion , OA  Daily Treatment Diary:    Manuals 9/1 9/12 9/15                                                              Neuro Re-Ed             Forw/back tandem 2x HHA 2x 2x          Side/side  2x HHA 2x           pods                                                                 Ther Ex             Hip adduction Ball 30x 30x 30x          Hip abduction Blue TB 30x 30x 30x          Nustep for strength 6' L4 10' L4 8' L4          Stand heel raises  20x 20x          Hip abduction standing  10x ea 20x ea          Stand ball press  5" 20x           multidus  5" 10xea red web 10"x10 red web slide          TB rows, shld extension  Red 20x Red 20x ea          SLS with hip IR/ER   10x ea           Lateral step ups   10x ea          Ther Activity             Step ups  4" 10x ea 4" 15x ea                       Gait Training             Steps, curbs  5' NV                       Modalities

## 2023-09-19 ENCOUNTER — OFFICE VISIT (OUTPATIENT)
Dept: PHYSICAL THERAPY | Age: 58
End: 2023-09-19
Payer: COMMERCIAL

## 2023-09-19 DIAGNOSIS — R26.89 POOR BALANCE: Primary | ICD-10-CM

## 2023-09-19 DIAGNOSIS — R53.1 GENERALIZED WEAKNESS: ICD-10-CM

## 2023-09-19 PROCEDURE — 97110 THERAPEUTIC EXERCISES: CPT | Performed by: PHYSICAL THERAPIST

## 2023-09-19 PROCEDURE — 97112 NEUROMUSCULAR REEDUCATION: CPT | Performed by: PHYSICAL THERAPIST

## 2023-09-19 NOTE — PROGRESS NOTES
Daily Note     Today's date: 2023  Patient name: Renetta Hinojosa  : 1965  MRN: [id number]  Referring provider: Elzbieta Aguirre DO  Dx:   Encounter Diagnosis     ICD-10-CM    1. Poor balance  R26.89       2. Generalized weakness  R53.1           Start Time: 1432  Stop Time: 1526  Total time in clinic (min): 54 minutes    Subjective: Patient states she felt good after last session but stiff the next day. Patient always c/o right knee medial pain. Objective: See treatment diary below      Assessment: Tolerated treatment well. Patient would benefit from continued PT. Cues for lifting feet, reviewed HEP. Plan: Continue per plan of care.       Precautions: HTN, B TKR 2012, L4-5 fusion 2019, OA  Daily Treatment Diary:    Manuals 9/1 9/12 9/15 9/19                                                             Neuro Re-Ed             Forw/back tandem 2x HHA 2x 2x 2x         Side/side  2x HHA 2x  2x         pods                                       Ther Ex             Core ball press supine    20x 5"                      Hip adduction Ball 30x 30x 30x 30x ball         Hip abduction Blue TB 30x 30x 30x 30x blue         Nustep for strength 6' L4 10' L4 8' L4 8' L4         Stand heel raises  20x 20x 20x         Hip abduction standing  10x ea 20x ea 20x ea         Stand ball press  5" 20x  5" 20x         multidus  5" 10xea red web 10"x10 red web slide 5" 10x red         TB rows, shld extension  Red 20x Red 20x ea Red 30x ea         SLS with hip IR/ER   10x ea           Ther Activity             Lateral step ups   10x ea 15x ea 4"                      Step ups  4" 10x ea 4" 15x ea 10x ea                      Gait Training             Steps, curbs  5' NV                       Modalities

## 2023-09-21 NOTE — PROGRESS NOTES
Daily Note     Today's date: 2023  Patient name: Alma Fields  : 1965  MRN: [id number]  Referring provider: Rangel Oviedo DO  Dx:   Encounter Diagnosis     ICD-10-CM    1. Poor balance  R26.89       2. Generalized weakness  R53.1                      Subjective: ***      Objective: See treatment diary below      Assessment: Tolerated treatment {Tolerated treatment :2677299775}.  Patient {assessment:[id number]}      Plan: {PLAN:[id number]}     Precautions: HTN, B TKR , L4-5 fusion , OA  Daily Treatment Diary:    Manuals 9/1 9/12 9/15 9/19                                                             Neuro Re-Ed             Forw/back tandem 2x HHA 2x 2x 2x         Side/side  2x HHA 2x  2x         pods                                       Ther Ex             Core ball press supine    20x 5"                      Hip adduction Ball 30x 30x 30x 30x ball         Hip abduction Blue TB 30x 30x 30x 30x blue         Nustep for strength 6' L4 10' L4 8' L4 8' L4         Stand heel raises  20x 20x 20x         Hip abduction standing  10x ea 20x ea 20x ea         Stand ball press  5" 20x  5" 20x         multidus  5" 10xea red web 10"x10 red web slide 5" 10x red         TB rows, shld extension  Red 20x Red 20x ea Red 30x ea         SLS with hip IR/ER   10x ea           Ther Activity             Lateral step ups   10x ea 15x ea 4"                      Step ups  4" 10x ea 4" 15x ea 10x ea                      Gait Training             Steps, curbs  5' NV                       Modalities

## 2023-09-22 ENCOUNTER — APPOINTMENT (OUTPATIENT)
Dept: PHYSICAL THERAPY | Age: 58
End: 2023-09-22
Payer: COMMERCIAL

## 2023-09-22 DIAGNOSIS — R53.1 GENERALIZED WEAKNESS: ICD-10-CM

## 2023-09-22 DIAGNOSIS — R26.89 POOR BALANCE: Primary | ICD-10-CM

## 2023-09-26 DIAGNOSIS — M15.9 GENERALIZED OSTEOARTHRITIS: ICD-10-CM

## 2023-09-26 RX ORDER — MELOXICAM 15 MG/1
15 TABLET ORAL DAILY PRN
Qty: 30 TABLET | Refills: 0 | Status: SHIPPED | OUTPATIENT
Start: 2023-09-26

## 2023-09-28 ENCOUNTER — APPOINTMENT (OUTPATIENT)
Dept: PHYSICAL THERAPY | Age: 58
End: 2023-09-28
Payer: COMMERCIAL

## 2023-09-29 ENCOUNTER — APPOINTMENT (OUTPATIENT)
Dept: PHYSICAL THERAPY | Age: 58
End: 2023-09-29
Payer: COMMERCIAL

## 2023-10-18 DIAGNOSIS — F51.04 PSYCHOPHYSIOLOGICAL INSOMNIA: ICD-10-CM

## 2023-10-19 RX ORDER — ZOLPIDEM TARTRATE 10 MG/1
10 TABLET ORAL
Qty: 30 TABLET | Refills: 0 | Status: SHIPPED | OUTPATIENT
Start: 2023-10-19

## 2023-11-03 DIAGNOSIS — F33.2 SEVERE EPISODE OF RECURRENT MAJOR DEPRESSIVE DISORDER, WITHOUT PSYCHOTIC FEATURES (HCC): ICD-10-CM

## 2023-11-03 RX ORDER — BUPROPION HYDROCHLORIDE 300 MG/1
300 TABLET ORAL EVERY MORNING
Qty: 90 TABLET | Refills: 0 | Status: SHIPPED | OUTPATIENT
Start: 2023-11-03

## 2023-11-26 DIAGNOSIS — F51.04 PSYCHOPHYSIOLOGICAL INSOMNIA: ICD-10-CM

## 2023-11-27 RX ORDER — ZOLPIDEM TARTRATE 10 MG/1
10 TABLET ORAL
Qty: 30 TABLET | Refills: 0 | Status: SHIPPED | OUTPATIENT
Start: 2023-11-27

## 2023-12-05 ENCOUNTER — OFFICE VISIT (OUTPATIENT)
Dept: DERMATOLOGY | Facility: CLINIC | Age: 58
End: 2023-12-05
Payer: COMMERCIAL

## 2023-12-05 VITALS — TEMPERATURE: 97.8 F | BODY MASS INDEX: 48.15 KG/M2 | HEIGHT: 65 IN | WEIGHT: 289 LBS

## 2023-12-05 DIAGNOSIS — L72.0 EPIDERMAL INCLUSION CYST: Primary | ICD-10-CM

## 2023-12-05 DIAGNOSIS — L81.4 LENTIGINES: ICD-10-CM

## 2023-12-05 PROCEDURE — 99203 OFFICE O/P NEW LOW 30 MIN: CPT | Performed by: DERMATOLOGY

## 2023-12-05 NOTE — PROGRESS NOTES
[facility] Note     Patient Name: Angelito Whitlock  Encounter Date: 12/5/23     Have you been cared for by a [facility] in the last 3 years and, if so, which description applies to you? NO. I am considered a "new" patient and must complete all patient intake questions. I am FEMALE/of child-bearing potential.    REVIEW OF SYSTEMS:  Have you recently had or currently have any of the following? Recent fever or chills? No  Any non-healing wound? No  Are you pregnant or planning to become pregnant? No  Are you currently or planning to be nursing or breast feeding? No   PAST MEDICAL HISTORY:  Have you personally ever had or currently have any of the following? If "YES," then please provide more detail. Skin cancer (such as Melanoma, Basal Cell Carcinoma, Squamous Cell Carcinoma? No  Tuberculosis, HIV/AIDS, Hepatitis B or C: No  Radiation Treatment No   HISTORY OF IMMUNOSUPPRESSION:   Do you have a history of any of the following:  Systemic Immunosuppression such as Diabetes, Biologic or Immunotherapy, Chemotherapy, Organ Transplantation, Bone Marrow Transplantation? No    Answering "YES" requires the addition of the dotphrase "IMMUNOSUPPRESSED" as the first diagnosis of the patient's visit. FAMILY HISTORY:  Any "first degree relatives" (parent, brother, sister, or child) with the following? Skin Cancer, Pancreatic or Other Cancer? YES, mother- skin (possible basal)   PATIENT EXPERIENCE:    Do you want the Dermatologist to perform a COMPLETE skin exam today including a clinical examination under the "bra and underwear" areas? NO  If necessary, do we have your permission to call and leave a detailed message on your Preferred Phone number that includes your specific medical information? Yes      Allergies   Allergen Reactions    Azithromycin Nausea Only     Category: sensitivity;      Erythromycin       Current Outpatient Medications:     buPROPion (WELLBUTRIN XL) 300 mg 24 hr tablet, TAKE 1 TABLET (300 MG TOTAL) BY MOUTH EVERY MORNING., Disp: 90 tablet, Rfl: 0    hydrochlorothiazide (HYDRODIURIL) 50 mg tablet, TAKE 1 TABLET BY MOUTH EVERY DAY, Disp: 90 tablet, Rfl: 1    ketorolac (TORADOL) 10 mg tablet, Take 1 tablet (10 mg total) by mouth every 8 (eight) hours for 5 days (Patient not taking: Reported on 7/24/2023), Disp: 15 tablet, Rfl: 0    meloxicam (MOBIC) 15 mg tablet, TAKE 1 TABLET BY MOUTH DAILY AS NEEDED FOR MODERATE PAIN., Disp: 30 tablet, Rfl: 0    methocarbamol (ROBAXIN) 500 mg tablet, Take 1 tablet (500 mg total) by mouth 4 (four) times a day (Patient not taking: Reported on 7/24/2023), Disp: 8 tablet, Rfl: 0    multivitamin (THERAGRAN) TABS, Take 1 tablet by mouth daily, Disp: , Rfl:     valsartan (DIOVAN) 320 MG tablet, Take 1 tablet (320 mg total) by mouth daily, Disp: 90 tablet, Rfl: 3    zolpidem (AMBIEN) 10 mg tablet, Take 1 tablet (10 mg total) by mouth daily at bedtime as needed for sleep, Disp: 30 tablet, Rfl: 0        New patient present for a lipoma on right hip present for about 8 months. SOC 2 little pimples that never got a head on back, patient tried squeezing and yellow clear liquid. Does not hurt or itch. Whom besides the patient is providing clinical information about today's encounter? NO ADDITIONAL HISTORIAN (patient alone provided history)    Physical Exam and Assessment/Plan by Diagnosis:      EPIDERMAL INCLUSION CYST    Physical Exam:  Anatomic Location Affected:  right hip, right back  Morphological Description:  subcutaneous nodule  Pertinent Positives:  Pertinent Negatives: Additional History of Present Condition: present for about 8 months. SOC 2 little pimples that never got a head on back, patient tried squeezing and yellow clear liquid. Does not hurt or itch.     Assessment and Plan:  Based on a thorough discussion of this condition and the management approach to it (including a comprehensive discussion of the known risks, side effects and potential benefits of treatment), the patient (family) agrees to implement the following specific plan:  Reassured benign  Educated that excision would be the only way to remove right hip. Not on blood thinners. No pacemaker/defibrillator. artificial joints > 12 years ago. No artificial heart valves. What are epidermal inclusion cysts? Epidermal inclusion cysts are the most common, benign cutaneous cysts. There are many different names for epidermal inclusion cysts, including epidermoid cyst, epidermal cyst, infundibular cyst, inclusion cyst, and keratin cyst. These cysts can occur anywhere on the body and typically present as nodules directly underneath the skin. There is often a visible pore or opening in the center. The cysts are freely moveable and can range from a few millimeters to several centimeters in diameter. The center of epidermoid cysts almost always contains keratin, which has a cheesy appearance, and not sebum. They also do not originate from sebaceous glands. Therefore, epidermal inclusion cysts are not the same as sebaceous cysts. Cysts may remain stable or progressively enlarge over time. There are no reliable predictive factors to tell if an epidermal inclusion cyst will enlarge, become inflamed, or remain quiescent. Infected cysts tend to become larger, turn red, and are more noticeable to the patient. There may be accompanying pain and discomfort. What causes epidermal inclusion cysts? Epidermal inclusion cysts often appear out of the blue and are not contagious. They are due to a proliferation of epidermal cells within the dermis and are more common in men than women. They occur more frequently in patients in their 20s to 45s.  Epidermal inclusion cysts by themselves are usually not inherited, but they can be hereditary in rare syndromes such as Romo syndrome, nodular elastosis with cysts and comedones (Favre-Racouchot syndrome), and basal cell nevus syndrome (Magdiellin syndrome). Elderly patients with chronic sun-damaged skin areas have a higher likelihood of developing epidermoid cysts. They often occur in areas where hair follicles have been inflamed or repeatedly irritated are more frequent in patients with acne vulgaris. In the  period, they are called milia. Patients on BRAF inhibitors such as imiquimod and cyclosporine have a higher incidence of epidermoid cysts of the face. How do we diagnose an epidermal inclusion cyst?  Epidermoid inclusion cysts are often diagnosed by history and physical exam. There is usually no need for biopsy prior to removal.  Radiographic and laboratory exams, such as ultrasound studies, are unnecessary and not typically ordered unless the practitioner suspects a genetic condition. What is the treatment for an epidermal inclusion cyst?  Inflamed, uninfected epidermal inclusion cysts rarely resolve spontaneously without therapy or surgical intervention. Treatment is not emergent unless desired by the patient. Definitive treatment is via surgical excision with walls intact. This method will prevent recurrence. This is best done when the cyst is not inflamed, to decrease the probability of rupture during surgery. A local anesthetic will be injected around the cyst  A small incision is made in the skin overlying the cyst, and contents are expressed  The incision is repaired with sutures    Another option is to use a 4mm punch biopsy with cyst extraction through the defect. Incision and drainage is often needed if the cyst is infected or inflamed. If there is surrounding cellulitis, oral antibiotic therapy may be necessary. The common agents used target methicillin sensitive Staphylococcal aureus and methicillin resistant S aureus in areas of high prevalence.          LENTIGO    Physical Exam:  Anatomic Location Affected:  cheeks  Morphological Description:  brown macules  Pertinent Positives:  Pertinent Negatives: Additional History of Present Condition:  Noted upon exam.    Assessment and Plan:  Based on a thorough discussion of this condition and the management approach to it (including a comprehensive discussion of the known risks, side effects and potential benefits of treatment), the patient (family) agrees to implement the following specific plan:  When outside we recommend using a wide brim hat, sunglasses, long sleeve and pants, sunscreen with SPF 94+ with reapplication every 2 hours, or SPF specific clothing    Start using over the counter Differin Gel (adapalene 0.1% gel). Spread one pea-sized amount of medication over entire face about one hour before bedtime        What is a lentigo? A lentigo is a pigmented flat or slightly raised lesion with a clearly defined edge. Unlike an ephelis (freckle), it does not fade in the winter months. There are several kinds of lentigo. The name lentigo originally referred to its appearance resembling a small lentil. The plural of lentigo is lentigines, although “lentigos” is also in common use. Who gets lentigines? Lentigines can affect males and females of all ages and races. Solar lentigines are especially prevalent in fair skinned adults. Lentigines associated with syndromes are present at birth or arise during childhood. What causes lentigines? Common forms of lentigo are due to exposure to ultraviolet radiation:  Sun damage including sunburn   Indoor tanning   Phototherapy, especially photochemotherapy (PUVA)    Ionizing radiation, eg radiation therapy, can also cause lentigines. Several familial syndromes associated with widespread lentigines originate from mutations in Tito-MAP kinase, mTOR signaling and PTEN pathways. What are the clinical features of lentigines?   Lentigines have been classified into several different types depending on what they look like, where they appear on the body, causative factors, and whether they are associated to other diseases or conditions. Lentigines may be solitary or more often, multiple. Most lentigines are smaller than 5 mm in diameter.     Lentigo simplex  A precursor to junctional naevus   Arises during childhood and early adult life   Found on trunk and limbs   Small brown round or oval macule or thin plaque   Jagged or smooth edge   May have a dry surface   May disappear in time  Solar lentigo  A precursor to seborrhoeic keratosis   Found on chronically sun exposed sites such as hands, face, lower legs   May also follow sunburn to shoulders   Yellow, light or dark brown regular or irregular macule or thin plaque   May have a dry surface   Often has moth-eaten outline   Can slowly enlarge to several centimeters in diameter   May disappear, often through the process known as lichenoid keratosis   When atypical in appearance, may be difficult to distinguish from melanoma in situ  Ink spot lentigo  Also known as reticulated lentigo   Few in number compared to solar lentigines   Follows sunburn in very fair skinned individuals   Dark brown to black irregular ink spot-like macule  PUVA lentigo  Similar to ink spot lentigo but follows photochemotherapy (PUVA)   Location anywhere exposed to PUVA  Tanning bed lentigo  Similar to ink spot lentigo but follows indoor tanning   Location anywhere exposed to tanning bed  Radiation lentigo  Occurs in site of irradiation (accidental or therapeutic)   Associated with late-stage radiation dermatitis: epidermal atrophy, subcutaneous fibrosis, keratosis, telangiectasias  Melanotic macule  Mucosal surfaces or adjacent glabrous skin eg lip, vulva, penis, anus   Light to dark brown   Also called mucosal melanosis  Generalised lentigines  Found on any exposed or covered site from early childhood   Small macules may merge to form larger patches   Not associated with a syndrome   Also called lentigines profusa, multiple lentigines  Agminated lentigines  Naevoid eruption of lentigos confined to a single segmental area   Sharp demarcation in midline   May have associated neurological and developmental abnormalities  Patterned lentigines  Inherited tendency to lentigines on face, lips, buttocks, palms, soles   Recognised mainly in people of  ethnicity  Centrofacial neurodysraphic lentiginosis  Associated with mental retardation  Lentiginosis syndromes  Syndromes include LEOPARD/Nette, Peutz-Jeghers, Laugier-Hunziker, Moynahan, Xeroderma pigmentosum, myxoma syndromes (TAM, NAME, Poole), Ruvalcaba-Myhre-Muse, Bannayan-Zonnana syndrome, Cowden disease (multiple hamartoma syndrome )   Inheritance is autosomal dominant; sporadic cases common   Widespread lentigines present at birth or arise in early childhood   Associated with neural, endocrine, and mesenchymal tumors    How is the diagnosis made? Lentigines are usually diagnosed clinically by their typical appearance. Concern regarding possibility of melanoma may lead to:  Dermatoscopy   Diagnostic excision biopsy    Histopathology of a lentigo shows: Thickened epidermis   An increased number of melanocytes along the basal layer of epidermis   Unlike junctional melanocytic naevus, there are no nests of melanocytes   Increased melanin pigment within the keratinocytes   Additional features depending on type of lentigo    In contrast, an ephelis (freckle) shows sun-induced increased melanin within the keratinocytes, without an increase in number of cells. What is the treatment for lentigines? Most lentigines are left alone. Attempts to lighten them may not be successful. The following approaches are used:  SPF 50+ broad-spectrum sunscreen   Hydroquinone bleaching cream   Alpha hydroxy acids   Vitamin C   Retinoids   Azelaic acid   Chemical peels  Individual lesions can be permanently removed using:  Cryotherapy   Intense pulsed light   Pigment lasers    How can lentigines be prevented?   Lentigines associated with exposure ultraviolet radiation can be prevented by very careful sun protection. Clothing is more successful at preventing new lentigines than are sunscreens. What is the outlook for lentigines? Lentigines usually persist. They may increase in number with age and sun exposure. Some in sun-protected sites may fade and disappear.       Scribe Attestation      I,:  Indio Haynes am acting as a scribe while in the presence of the attending physician.:       I,:  Lenin Dunlap MD personally performed the services described in this documentation    as scribed in my presence.:

## 2023-12-09 DIAGNOSIS — M15.9 GENERALIZED OSTEOARTHRITIS: ICD-10-CM

## 2023-12-11 RX ORDER — MELOXICAM 15 MG/1
15 TABLET ORAL DAILY PRN
Qty: 30 TABLET | Refills: 0 | Status: SHIPPED | OUTPATIENT
Start: 2023-12-11

## 2024-01-02 DIAGNOSIS — F51.04 PSYCHOPHYSIOLOGICAL INSOMNIA: ICD-10-CM

## 2024-01-03 RX ORDER — ZOLPIDEM TARTRATE 10 MG/1
10 TABLET ORAL
Qty: 30 TABLET | Refills: 0 | Status: SHIPPED | OUTPATIENT
Start: 2024-01-03

## 2024-01-14 ENCOUNTER — NURSE TRIAGE (OUTPATIENT)
Dept: OTHER | Facility: OTHER | Age: 59
End: 2024-01-14

## 2024-01-14 DIAGNOSIS — M54.50 ACUTE RIGHT-SIDED LOW BACK PAIN WITHOUT SCIATICA: Primary | ICD-10-CM

## 2024-01-14 RX ORDER — KETOROLAC TROMETHAMINE 10 MG/1
10 TABLET, FILM COATED ORAL EVERY 8 HOURS PRN
Qty: 20 TABLET | Refills: 0 | Status: SHIPPED | OUTPATIENT
Start: 2024-01-14

## 2024-01-14 NOTE — TELEPHONE ENCOUNTER
"Reason for Disposition  • [1] SEVERE back pain (e.g., excruciating, unable to do any normal activities) AND [2] not improved 2 hours after pain medicine    Answer Assessment - Initial Assessment Questions  1. ONSET: \"When did the pain begin?\"       Yesterday am    2. LOCATION: \"Where does it hurt?\" (upper, mid or lower back)      Around L3 and L5, right hand side.    3. SEVERITY: \"How bad is the pain?\"  (e.g., Scale 1-10; mild, moderate, or severe)    - MILD (1-3): doesn't interfere with normal activities     - MODERATE (4-7): interferes with normal activities or awakens from sleep     - SEVERE (8-10): excruciating pain, unable to do any normal activities       9 during spasms; right now, just hurts      4. PATTERN: \"Is the pain constant?\" (e.g., yes, no; constant, intermittent)       Intermittent    5. RADIATION: \"Does the pain shoot into your legs or elsewhere?\"      Shoots down to the bottom of buttocks on right side    6. CAUSE:  \"What do you think is causing the back pain?\"       Muscle spasm could be from fall two weeks ago    7. BACK OVERUSE:  \"Any recent lifting of heavy objects, strenuous work or exercise?\"      Had a tumble the other day, fell and hit trash can (tripped)    8. MEDICATIONS: \"What have you taken so far for the pain?\" (e.g., nothing, acetaminophen, NSAIDS)      Has taken Advil, Tylenol, Mobic with no relief    9. NEUROLOGIC SYMPTOMS: \"Do you have any weakness, numbness, or problems with bowel/bladder control?\"      Denies; occasionally has burning sensation down leg and on top of toe    10. OTHER SYMPTOMS: \"Do you have any other symptoms?\" (e.g., fever, abdominal pain, burning with urination, blood in urine)        Denies    11. PREGNANCY: \"Is there any chance you are pregnant?\" (e.g., yes, no; LMP)        Denies    Had back surgery in the past; can't walk right, bent over.    Protocols used: Back Pain-ADULT-AH    "

## 2024-01-14 NOTE — TELEPHONE ENCOUNTER
"Regarding: muscle spasms in her back  ----- Message from Tiffany Watt sent at 1/14/2024 10:08 AM EST -----  Pt called \"I am having severe muscle spasms in my back.\"    "

## 2024-01-15 ENCOUNTER — OFFICE VISIT (OUTPATIENT)
Dept: FAMILY MEDICINE CLINIC | Facility: CLINIC | Age: 59
End: 2024-01-15
Payer: COMMERCIAL

## 2024-01-15 VITALS
WEIGHT: 293 LBS | DIASTOLIC BLOOD PRESSURE: 94 MMHG | OXYGEN SATURATION: 99 % | BODY MASS INDEX: 50.02 KG/M2 | TEMPERATURE: 97.6 F | HEART RATE: 87 BPM | SYSTOLIC BLOOD PRESSURE: 138 MMHG | HEIGHT: 64 IN

## 2024-01-15 DIAGNOSIS — F33.42 RECURRENT MAJOR DEPRESSIVE DISORDER, IN FULL REMISSION (HCC): ICD-10-CM

## 2024-01-15 DIAGNOSIS — S32.030D CLOSED COMPRESSION FRACTURE OF L3 LUMBAR VERTEBRA WITH ROUTINE HEALING, SUBSEQUENT ENCOUNTER: ICD-10-CM

## 2024-01-15 DIAGNOSIS — M54.50 ACUTE RIGHT-SIDED LOW BACK PAIN WITHOUT SCIATICA: Primary | ICD-10-CM

## 2024-01-15 DIAGNOSIS — E66.01 MORBID OBESITY (HCC): ICD-10-CM

## 2024-01-15 PROCEDURE — 99214 OFFICE O/P EST MOD 30 MIN: CPT | Performed by: FAMILY MEDICINE

## 2024-01-15 RX ORDER — CYCLOBENZAPRINE HCL 10 MG
10 TABLET ORAL 3 TIMES DAILY
Qty: 30 TABLET | Refills: 1 | Status: SHIPPED | OUTPATIENT
Start: 2024-01-15

## 2024-01-15 NOTE — PROGRESS NOTES
Assessment/Plan:       Problem List Items Addressed This Visit          Musculoskeletal and Integument    Closed compression fracture of L3 vertebra (HCC)     Old compression fracture at L3 this does not appear to be in the same region  of the old injury.  She has a musculoskeletal issue now which is more related to muscle spasm.  Will provide her with Flexeril and she will continue with the Toradol         Relevant Medications    cyclobenzaprine (FLEXERIL) 10 mg tablet       Other    Morbid obesity (HCC)     Weight is up-stress going through divorce, dog , mother  and needs right knee TKA replacement.         Relevant Medications    cyclobenzaprine (FLEXERIL) 10 mg tablet    Recurrent major depressive disorder, in full remission (HCC)     Continue with Wellbutrin no change in dosing follow-up with me now in 3 to 4 months         Acute right-sided low back pain without sciatica - Primary     Add Flexeril now patient did  Toradol.  Also add physical therapy as needed         Relevant Medications    cyclobenzaprine (FLEXERIL) 10 mg tablet         Subjective:      Patient ID: Jonna Krishnan is a 58 y.o. female.    Back Pain  Pertinent negatives include no abdominal pain, chest pain, dysuria or fever.       The following portions of the patient's history were reviewed and updated as appropriate: allergies, current medications, past family history, past medical history, past social history, past surgical history and problem list.    Review of Systems   Constitutional:  Negative for chills and fever.   HENT:  Negative for ear pain and sore throat.    Eyes:  Negative for pain and visual disturbance.   Respiratory:  Negative for cough and shortness of breath.    Cardiovascular:  Negative for chest pain and palpitations.   Gastrointestinal:  Negative for abdominal pain and vomiting.   Genitourinary:  Negative for dysuria and hematuria.   Musculoskeletal:  Positive for back pain. Negative for arthralgias.  "  Skin:  Negative for color change and rash.   Neurological:  Negative for seizures and syncope.   All other systems reviewed and are negative.        Objective:      /94 (BP Location: Left arm, Patient Position: Sitting, Cuff Size: Large)   Pulse 87   Temp 97.6 °F (36.4 °C)   Ht 5' 4\" (1.626 m)   Wt 134 kg (295 lb)   SpO2 99%   BMI 50.64 kg/m²        Physical Exam  Vitals and nursing note reviewed.   Constitutional:       General: She is not in acute distress.     Appearance: Normal appearance. She is well-developed. She is obese.   HENT:      Head: Normocephalic and atraumatic.      Right Ear: External ear normal.      Left Ear: External ear normal.      Nose: Nose normal.      Mouth/Throat:      Mouth: Mucous membranes are moist.      Pharynx: Oropharynx is clear. No oropharyngeal exudate.   Eyes:      General: No scleral icterus.        Right eye: No discharge.         Left eye: No discharge.      Conjunctiva/sclera: Conjunctivae normal.      Pupils: Pupils are equal, round, and reactive to light.   Neck:      Thyroid: No thyromegaly.      Vascular: No JVD.   Cardiovascular:      Rate and Rhythm: Normal rate and regular rhythm.      Heart sounds: Normal heart sounds. No murmur heard.  Pulmonary:      Effort: Pulmonary effort is normal.      Breath sounds: No wheezing or rales.   Chest:      Chest wall: No tenderness.   Abdominal:      General: Bowel sounds are normal. There is no distension.      Palpations: Abdomen is soft. There is no mass.      Tenderness: There is no abdominal tenderness.   Musculoskeletal:         General: No tenderness or deformity. Normal range of motion.      Cervical back: Normal range of motion.   Lymphadenopathy:      Cervical: No cervical adenopathy.   Skin:     General: Skin is warm and dry.      Findings: No rash.   Neurological:      General: No focal deficit present.      Mental Status: She is alert and oriented to person, place, and time.      Cranial Nerves: No " "cranial nerve deficit.      Coordination: Coordination normal.      Deep Tendon Reflexes: Reflexes are normal and symmetric. Reflexes normal.   Psychiatric:         Mood and Affect: Mood normal.         Behavior: Behavior normal.         Thought Content: Thought content normal.         Judgment: Judgment normal.          Data:    Laboratory Results: I have personally reviewed the pertinent laboratory results/reports   Radiology/Other Diagnostic Testing Results: I have personally reviewed pertinent reports.       Lab Results   Component Value Date    WBC 3.38 (L) 07/22/2023    HGB 13.8 07/22/2023    HCT 39.9 07/22/2023    MCV 88 07/22/2023     07/22/2023     Lab Results   Component Value Date    K 3.8 07/22/2023    CL 95 (L) 07/22/2023    CO2 27 07/22/2023    BUN 9 07/22/2023    CREATININE 0.72 07/22/2023    GLUF 92 07/22/2023    CALCIUM 9.6 07/22/2023    AST 23 07/22/2023    ALT 23 07/22/2023    ALKPHOS 121 (H) 07/22/2023    EGFR 93 07/22/2023     Lab Results   Component Value Date    CHOLESTEROL 187 07/22/2023    CHOLESTEROL 164 09/10/2021    CHOLESTEROL 193 10/14/2020     Lab Results   Component Value Date    HDL 72 07/22/2023    HDL 80 09/10/2021    HDL 78 10/14/2020     Lab Results   Component Value Date    LDLCALC 101 (H) 07/22/2023    LDLCALC 71 09/10/2021    LDLCALC 102 (H) 10/14/2020     Lab Results   Component Value Date    TRIG 71 07/22/2023    TRIG 63 09/10/2021    TRIG 63 10/14/2020     No results found for: \"CHOLHDL\"  Lab Results   Component Value Date    XSX5NIXGEXVK 2.561 07/22/2023     Lab Results   Component Value Date    HGBA1C 4.9 09/10/2021     No results found for: \"PSA\"    Jeramy Estevez, DO      "

## 2024-01-15 NOTE — ASSESSMENT & PLAN NOTE
Old compression fracture at L3 this does not appear to be in the same region  of the old injury.  She has a musculoskeletal issue now which is more related to muscle spasm.  Will provide her with Flexeril and she will continue with the Toradol

## 2024-01-17 DIAGNOSIS — F33.2 SEVERE EPISODE OF RECURRENT MAJOR DEPRESSIVE DISORDER, WITHOUT PSYCHOTIC FEATURES (HCC): ICD-10-CM

## 2024-01-17 RX ORDER — BUPROPION HYDROCHLORIDE 300 MG/1
300 TABLET ORAL EVERY MORNING
Qty: 90 TABLET | Refills: 0 | Status: SHIPPED | OUTPATIENT
Start: 2024-01-17

## 2024-02-10 DIAGNOSIS — I10 ESSENTIAL HYPERTENSION: ICD-10-CM

## 2024-02-12 DIAGNOSIS — F51.04 PSYCHOPHYSIOLOGICAL INSOMNIA: ICD-10-CM

## 2024-02-12 RX ORDER — ZOLPIDEM TARTRATE 10 MG/1
10 TABLET ORAL
Qty: 30 TABLET | Refills: 0 | Status: SHIPPED | OUTPATIENT
Start: 2024-02-12

## 2024-02-12 RX ORDER — VALSARTAN 320 MG/1
320 TABLET ORAL DAILY
Qty: 90 TABLET | Refills: 3 | Status: SHIPPED | OUTPATIENT
Start: 2024-02-12

## 2024-02-14 ENCOUNTER — TELEPHONE (OUTPATIENT)
Dept: FAMILY MEDICINE CLINIC | Facility: CLINIC | Age: 59
End: 2024-02-14

## 2024-02-21 DIAGNOSIS — M54.50 ACUTE RIGHT-SIDED LOW BACK PAIN WITHOUT SCIATICA: ICD-10-CM

## 2024-02-21 RX ORDER — KETOROLAC TROMETHAMINE 10 MG/1
10 TABLET, FILM COATED ORAL EVERY 8 HOURS PRN
Qty: 20 TABLET | Refills: 0 | Status: SHIPPED | OUTPATIENT
Start: 2024-02-21

## 2024-03-14 DIAGNOSIS — F51.04 PSYCHOPHYSIOLOGICAL INSOMNIA: ICD-10-CM

## 2024-03-19 RX ORDER — ZOLPIDEM TARTRATE 10 MG/1
10 TABLET ORAL
Qty: 30 TABLET | Refills: 0 | Status: SHIPPED | OUTPATIENT
Start: 2024-03-19

## 2024-03-25 DIAGNOSIS — I10 ESSENTIAL HYPERTENSION: ICD-10-CM

## 2024-03-25 RX ORDER — HYDROCHLOROTHIAZIDE 50 MG/1
50 TABLET ORAL DAILY
Qty: 90 TABLET | Refills: 1 | Status: SHIPPED | OUTPATIENT
Start: 2024-03-25

## 2024-04-08 ENCOUNTER — TELEMEDICINE (OUTPATIENT)
Dept: FAMILY MEDICINE CLINIC | Facility: CLINIC | Age: 59
End: 2024-04-08
Payer: COMMERCIAL

## 2024-04-08 DIAGNOSIS — M17.11 PRIMARY OSTEOARTHRITIS OF RIGHT KNEE: ICD-10-CM

## 2024-04-08 DIAGNOSIS — J01.10 ACUTE NON-RECURRENT FRONTAL SINUSITIS: Primary | ICD-10-CM

## 2024-04-08 PROCEDURE — 99214 OFFICE O/P EST MOD 30 MIN: CPT | Performed by: NURSE PRACTITIONER

## 2024-04-08 RX ORDER — AMOXICILLIN 500 MG/1
500 CAPSULE ORAL EVERY 8 HOURS SCHEDULED
Qty: 30 CAPSULE | Refills: 0 | Status: SHIPPED | OUTPATIENT
Start: 2024-04-08 | End: 2024-04-18

## 2024-04-08 NOTE — ASSESSMENT & PLAN NOTE
Referral placed for weight management as she will be needing a right knee revision and needing weight loss.  Does have history of gastric surgery.

## 2024-04-08 NOTE — PROGRESS NOTES
Virtual Regular Visit    Verification of patient location:    Patient is located at Home in the following state in which I hold an active license [place]      Assessment/Plan:    Problem List Items Addressed This Visit          Respiratory    Acute non-recurrent frontal sinusitis - Primary    Relevant Medications    amoxicillin (AMOXIL) 500 mg capsule       Musculoskeletal and Integument    Primary osteoarthritis of right knee     Currently under the care of of [facility] orthopedics.  Will be needing revision to her right knee, recommending weight loss prior surgery.            Other    BMI 50.0-59.9, adult ([facility])     Referral placed for weight management as she will be needing a right knee revision and needing weight loss.  Does have history of gastric surgery.         Relevant Orders    Ambulatory Referral to Weight Management            Reason for visit is   Chief Complaint   Patient presents with    Nasal Congestion     Dark green mucus    Cough    Virtual Regular Visit        Encounter provider LETICIA Dumont    Provider located at [facility]  [address]  [facility] [id number] [phone]      Recent Visits  No visits were found meeting these conditions.  Showing recent visits within past 7 days and meeting all other requirements  Today's Visits  Date Type Provider Dept   04/08/24 Telemedicine LETICIA Dumont [facility]   Showing today's visits and meeting all other requirements  Future Appointments  No visits were found meeting these conditions.  Showing future appointments within next 150 days and meeting all other requirements       The patient was identified by name and date of birth. Jonna Krishnan was informed that this is a telemedicine visit and that the visit is being conducted through the [place] platform. She agrees to proceed..  My office door was closed. No one else was in the room.  She acknowledged consent and  understanding of privacy and security of the video platform. The patient has agreed to participate and understands they can discontinue the visit at any time.    Patient is aware this is a billable service.     Subjective  Jonna Krishnan is a 59 y.o. female who presents today for acute sick visit .she reports Sinus pain and pressure, nasal drainage, cough. She reports symptoms ongoing since last week. She reports worsening symptoms Friday and Saturday. She does report visitors last week, sick contacts.         HPI     Past Medical History:   Diagnosis Date    Arthritis     Chronic    Depression     Chronic    Eye disease     EBMD    Heart murmur     Chronic    Hypertension     Obesity     Chronic    Psychiatric disorder     depression    Visual impairment 2020    Chronic       Past Surgical History:   Procedure Laterality Date    BACK SURGERY       SECTION  1985,1988    2    JOINT REPLACEMENT      BKR    REPLACEMENT TOTAL KNEE Bilateral     SPINE SURGERY  2019    L4-L5 fusion       Current Outpatient Medications   Medication Sig Dispense Refill    amoxicillin (AMOXIL) 500 mg capsule Take 1 capsule (500 mg total) by mouth every 8 (eight) hours for 10 days 30 capsule 0    buPROPion (WELLBUTRIN XL) 300 mg 24 hr tablet TAKE 1 TABLET (300 MG TOTAL) BY MOUTH EVERY MORNING. 90 tablet 0    cyclobenzaprine (FLEXERIL) 10 mg tablet Take 1 tablet (10 mg total) by mouth 3 (three) times a day (Patient taking differently: Take 10 mg by mouth 3 (three) times a day PRN) 30 tablet 1    hydroCHLOROthiazide 50 mg tablet TAKE 1 TABLET BY MOUTH EVERY DAY 90 tablet 1    meloxicam (MOBIC) 15 mg tablet TAKE 1 TABLET BY MOUTH DAILY AS NEEDED FOR MODERATE PAIN 30 tablet 0    multivitamin (THERAGRAN) TABS Take 1 tablet by mouth daily      valsartan (DIOVAN) 320 MG tablet TAKE 1 TABLET BY MOUTH EVERY DAY 90 tablet 3    zolpidem (AMBIEN) 10 mg tablet Take 1 tablet (10 mg total) by mouth daily at bedtime as needed for  sleep 30 tablet 0    ketorolac (TORADOL) 10 mg tablet Take 1 tablet (10 mg total) by mouth every 8 (eight) hours as needed for moderate pain (back spasms) (Patient not taking: Reported on 4/8/2024) 20 tablet 0     No current facility-administered medications for this visit.        Allergies   Allergen Reactions    Azithromycin Nausea Only     Category: sensitivity;     Erythromycin        Review of Systems   Constitutional:  Negative for activity change, chills, fatigue and fever.   HENT:  Positive for congestion, rhinorrhea, sinus pressure and sinus pain. Negative for ear discharge, ear pain, sore throat, tinnitus and trouble swallowing.    Eyes:  Negative for photophobia, pain, discharge, itching and visual disturbance.   Respiratory:  Positive for cough. Negative for chest tightness, shortness of breath and wheezing.    Cardiovascular:  Negative for chest pain and leg swelling.   Gastrointestinal:  Negative for abdominal distention, abdominal pain, constipation, diarrhea, nausea and vomiting.   Endocrine: Negative for polydipsia, polyphagia and polyuria.   Genitourinary:  Negative for dysuria and frequency.   Musculoskeletal:  Negative for arthralgias, myalgias, neck pain and neck stiffness.   Skin:  Negative for color change.   Neurological:  Negative for dizziness, syncope, weakness, numbness and headaches.   Hematological:  Does not bruise/bleed easily.   Psychiatric/Behavioral:  Negative for behavioral problems, confusion, self-injury, sleep disturbance and suicidal ideas. The patient is not nervous/anxious.        Video Exam    There were no vitals filed for this visit.    Physical Exam  HENT:      Head: Normocephalic and atraumatic.   Pulmonary:      Effort: Pulmonary effort is normal.   Neurological:      General: No focal deficit present.      Mental Status: She is alert and oriented to person, place, and time.   Psychiatric:         Mood and Affect: Mood normal.         Behavior: Behavior normal.           Visit Time  Total Visit Duration: 15

## 2024-04-08 NOTE — ASSESSMENT & PLAN NOTE
Currently under the care of of [facility] orthopedics.  Will be needing revision to her right knee, recommending weight loss prior surgery.

## 2024-04-20 DIAGNOSIS — F51.04 PSYCHOPHYSIOLOGICAL INSOMNIA: ICD-10-CM

## 2024-04-22 RX ORDER — ZOLPIDEM TARTRATE 10 MG/1
10 TABLET ORAL
Qty: 30 TABLET | Refills: 0 | Status: SHIPPED | OUTPATIENT
Start: 2024-04-22

## 2024-04-30 DIAGNOSIS — F33.2 SEVERE EPISODE OF RECURRENT MAJOR DEPRESSIVE DISORDER, WITHOUT PSYCHOTIC FEATURES (HCC): ICD-10-CM

## 2024-05-01 RX ORDER — BUPROPION HYDROCHLORIDE 300 MG/1
300 TABLET ORAL EVERY MORNING
Qty: 90 TABLET | Refills: 1 | Status: SHIPPED | OUTPATIENT
Start: 2024-05-01

## 2024-05-02 ENCOUNTER — TELEPHONE (OUTPATIENT)
Dept: BARIATRICS | Facility: CLINIC | Age: 59
End: 2024-05-02

## 2024-05-02 ENCOUNTER — TELEPHONE (OUTPATIENT)
Age: 59
End: 2024-05-02

## 2024-05-02 NOTE — TELEPHONE ENCOUNTER
"New patient requesting a appointment .Had Lap Band placed 3/2009 now needs to have it \"refilled with fluid\". Was reevaluated at [facility] on 11/11/19.Has never seen a [facility] Bariatric medical/surgical doctor. Please advise  "

## 2024-05-02 NOTE — TELEPHONE ENCOUNTER
LVM to return call to schedule an appointment with surgical due to being a transfer surgical pt. Pt was scheduled incorrectly with medical.

## 2024-05-08 PROBLEM — J01.10 ACUTE NON-RECURRENT FRONTAL SINUSITIS: Status: RESOLVED | Noted: 2024-04-08 | Resolved: 2024-05-08

## 2024-05-17 ENCOUNTER — CONSULT (OUTPATIENT)
Dept: BARIATRICS | Facility: CLINIC | Age: 59
End: 2024-05-17
Payer: COMMERCIAL

## 2024-05-17 ENCOUNTER — PREP FOR PROCEDURE (OUTPATIENT)
Dept: BARIATRICS | Facility: CLINIC | Age: 59
End: 2024-05-17

## 2024-05-17 VITALS
WEIGHT: 285 LBS | BODY MASS INDEX: 50.5 KG/M2 | HEART RATE: 87 BPM | SYSTOLIC BLOOD PRESSURE: 110 MMHG | RESPIRATION RATE: 16 BRPM | HEIGHT: 63 IN | DIASTOLIC BLOOD PRESSURE: 80 MMHG

## 2024-05-17 DIAGNOSIS — E66.01 MORBID (SEVERE) OBESITY DUE TO EXCESS CALORIES (HCC): ICD-10-CM

## 2024-05-17 DIAGNOSIS — Z98.84 GASTRIC BANDING STATUS: ICD-10-CM

## 2024-05-17 DIAGNOSIS — Z98.84 BARIATRIC SURGERY STATUS: Primary | ICD-10-CM

## 2024-05-17 DIAGNOSIS — Z01.818 ENCOUNTER FOR OTHER PREPROCEDURAL EXAMINATION: Primary | ICD-10-CM

## 2024-05-17 DIAGNOSIS — I10 ESSENTIAL HYPERTENSION: ICD-10-CM

## 2024-05-17 DIAGNOSIS — F32.A DEPRESSION, UNSPECIFIED DEPRESSION TYPE: ICD-10-CM

## 2024-05-17 PROCEDURE — 99204 OFFICE O/P NEW MOD 45 MIN: CPT | Performed by: SURGERY

## 2024-05-17 NOTE — PROGRESS NOTES
H&P Exam - Bariatric Surgery   Jonna Krishnan 59 y.o. female MRN: [id number]  Unit/Bed#:  Encounter: [id number]    Assessment & Plan     Essential hypertension  Currently normal with medication  Continue care per treatment team    Depression  Stable   Cont care per treatment team    Gastric banding status  Obtain UGI to evaluate esophageal motility and band position    Obtain EGD to eval for band erosion    Patient to obtain medical records to determine what type of band she had placed to determine the capacity of the band    Because I was not able to palpate the port, if above studies are normal, and we determine what band she does have, I will consult Interventional Radiology for band fill     Morbid (severe) obesity due to excess calories (HCC)  Does not wish to pursue revisional surgery at this time     Continue with lifestyle changes and behavioral modifications    Band adjustments pending results above    BMI 50.0-59.9, adult (HCC)  As above      History of Present Illness     HPI:  Jonna Krishnan is a 59 y.o. female who presents with weight regain after initial adequate weight loss. She had a laparoscopic gastric adjustable band placed 14 years ago. She initially weighed 335 lbs and got down to 220 lbs. She slowly regained. She is going through a divorce. She is not eating mindfully and she knows there are things that she can work on. We did discuss converting to a more metabolically active procedure in the future.     Review of Systems   Gastrointestinal:  Negative for abdominal pain.        Denies GERD   All other systems reviewed and are negative.      Historical Information   Past Medical History:   Diagnosis Date    Arthritis     Chronic    Depression 2000    Chronic    Eye disease     EBMD    Heart murmur     Chronic    Hypertension     Obesity     Chronic    Psychiatric disorder     depression    Visual impairment 01/2020    Chronic     Past Surgical History:   Procedure Laterality Date    BACK SURGERY  "      SECTION  1985,1988    2    JOINT REPLACEMENT  2012    BKR    REPLACEMENT TOTAL KNEE Bilateral     SPINE SURGERY  2019    L4-L5 fusion     Social History   Social History     Substance and Sexual Activity   Alcohol Use Not Currently    Alcohol/week: 2.0 standard drinks of alcohol    Types: 2 Glasses of wine per week     Social History     Substance and Sexual Activity   Drug Use Never     Social History     Tobacco Use   Smoking Status Never   Smokeless Tobacco Never     Family History: non-contributory    Meds/Allergies   all medications and allergies reviewed  Allergies   Allergen Reactions    Azithromycin Nausea Only     Category: sensitivity;     Erythromycin        Objective     Current Vitals:   Blood Pressure: 110/80 (24 1323)  Pulse: 87 (24 1323)  Respirations: 16 (24 1323)  Height: 5' 3.11\" (160.3 cm) (24 1323)  Weight - Scale: 129 kg (285 lb) (24 1323)        Physical Exam  Constitutional:       Appearance: Normal appearance.   HENT:      Head: Atraumatic.      Nose: No rhinorrhea.   Eyes:      Extraocular Movements: Extraocular movements intact.   Cardiovascular:      Rate and Rhythm: Normal rate.   Pulmonary:      Effort: Pulmonary effort is normal. No respiratory distress.   Abdominal:      General: Abdomen is flat. There is no distension.      Palpations: Abdomen is soft.      Tenderness: There is no abdominal tenderness.      Comments: Did not definitively palpate the port   Musculoskeletal:         General: Normal range of motion.      Cervical back: Normal range of motion.   Skin:     General: Skin is warm and dry.   Neurological:      General: No focal deficit present.      Mental Status: She is alert and oriented to person, place, and time.   Psychiatric:         Mood and Affect: Mood normal.         Behavior: Behavior normal.         Lab Results: I have personally reviewed pertinent lab results.    Imaging: I have personally reviewed pertinent " reports.    EKG, Pathology, and Other Studies: I have personally reviewed pertinent reports.        Counseling / Coordination of Care  Total time spent today 45 minutes.  Greater than 50% of total time was spent with the patient, counseling and coordination of care.

## 2024-05-17 NOTE — ASSESSMENT & PLAN NOTE
Obtain UGI to evaluate esophageal motility and band position    Obtain EGD to eval for band erosion    Patient to obtain medical records to determine what type of band she had placed to determine the capacity of the band    Because I was not able to palpate the port, if above studies are normal, and we determine what band she does have, I will consult Interventional Radiology for band fill

## 2024-05-17 NOTE — ASSESSMENT & PLAN NOTE
Does not wish to pursue revisional surgery at this time     Continue with lifestyle changes and behavioral modifications    Band adjustments pending results above

## 2024-05-17 NOTE — LETTER
May 17, 2024     Jeramy Estevez DO  [address] [place], [place] [id number]    Patient: Jonna Krishnan   YOB: 1965   Date of Visit: 5/17/2024       Dear Dr. Estevez:    Thank you for referring Jonna Krishnan to me for evaluation. Below are my notes for this consultation.    If you have questions, please do not hesitate to call me. I look forward to following your patient along with you.         Sincerely,        Tadeo Cortez MD        CC: No Recipients    Tadeo Cortez MD  5/17/2024  2:18 PM  Sign when Signing Visit  H&P Exam - Bariatric Surgery   Jonna Krishnan 59 y.o. female MRN: [id number]  Unit/Bed#:  Encounter: [id number]    Assessment & Plan    Essential hypertension  Currently normal with medication  Continue care per treatment team    Depression  Stable   Cont care per treatment team    Gastric banding status  Obtain UGI to evaluate esophageal motility and band position    Obtain EGD to eval for band erosion    Patient to obtain medical records to determine what type of band she had placed to determine the capacity of the band    Because I was not able to palpate the port, if above studies are normal, and we determine what band she does have, I will consult Interventional Radiology for band fill     Morbid (severe) obesity due to excess calories (HCC)  Does not wish to pursue revisional surgery at this time     Continue with lifestyle changes and behavioral modifications    Band adjustments pending results above    BMI 50.0-59.9, adult (HCC)  As above      History of Present Illness    HPI:  Jonna Krishnan is a 59 y.o. female who presents with weight regain after initial adequate weight loss. She had a laparoscopic gastric adjustable band placed 14 years ago. She initially weighed 335 lbs and got down to 220 lbs. She slowly regained. She is going through a divorce. She is not eating mindfully and she knows there are things that she can work on. We did discuss converting to a  "more metabolically active procedure in the future.     Review of Systems   Gastrointestinal:  Negative for abdominal pain.        Denies GERD   All other systems reviewed and are negative.      Historical Information  Past Medical History:   Diagnosis Date   • Arthritis     Chronic   • Depression     Chronic   • Eye disease     EBMD   • Heart murmur     Chronic   • Hypertension    • Obesity     Chronic   • Psychiatric disorder     depression   • Visual impairment 2020    Chronic     Past Surgical History:   Procedure Laterality Date   • BACK SURGERY     •  SECTION  1985,1988    2   • JOINT REPLACEMENT  2012    BKR   • REPLACEMENT TOTAL KNEE Bilateral    • SPINE SURGERY  2019    L4-L5 fusion     Social History  Social History     Substance and Sexual Activity   Alcohol Use Not Currently   • Alcohol/week: 2.0 standard drinks of alcohol   • Types: 2 Glasses of wine per week     Social History     Substance and Sexual Activity   Drug Use Never     Social History     Tobacco Use   Smoking Status Never   Smokeless Tobacco Never     Family History: non-contributory    Meds/Allergies  all medications and allergies reviewed  Allergies   Allergen Reactions   • Azithromycin Nausea Only     Category: sensitivity;    • Erythromycin        Objective    Current Vitals:   Blood Pressure: 110/80 (24 1323)  Pulse: 87 (24 1323)  Respirations: 16 (24 1323)  Height: 5' 3.11\" (160.3 cm) (24 1323)  Weight - Scale: 129 kg (285 lb) (24 1323)        Physical Exam  Constitutional:       Appearance: Normal appearance.   HENT:      Head: Atraumatic.      Nose: No rhinorrhea.   Eyes:      Extraocular Movements: Extraocular movements intact.   Cardiovascular:      Rate and Rhythm: Normal rate.   Pulmonary:      Effort: Pulmonary effort is normal. No respiratory distress.   Abdominal:      General: Abdomen is flat. There is no distension.      Palpations: Abdomen is soft.      Tenderness: There is " no abdominal tenderness.      Comments: Did not definitively palpate the port   Musculoskeletal:         General: Normal range of motion.      Cervical back: Normal range of motion.   Skin:     General: Skin is warm and dry.   Neurological:      General: No focal deficit present.      Mental Status: She is alert and oriented to person, place, and time.   Psychiatric:         Mood and Affect: Mood normal.         Behavior: Behavior normal.         Lab Results: I have personally reviewed pertinent lab results.    Imaging: I have personally reviewed pertinent reports.    EKG, Pathology, and Other Studies: I have personally reviewed pertinent reports.        Counseling / Coordination of Care  Total time spent today 45 minutes.  Greater than 50% of total time was spent with the patient, counseling and coordination of care.

## 2024-05-29 DIAGNOSIS — I10 ESSENTIAL HYPERTENSION: ICD-10-CM

## 2024-05-29 DIAGNOSIS — F51.04 PSYCHOPHYSIOLOGICAL INSOMNIA: ICD-10-CM

## 2024-05-30 RX ORDER — HYDROCHLOROTHIAZIDE 50 MG/1
50 TABLET ORAL DAILY
Qty: 90 TABLET | Refills: 1 | Status: SHIPPED | OUTPATIENT
Start: 2024-05-30

## 2024-05-31 RX ORDER — ZOLPIDEM TARTRATE 10 MG/1
10 TABLET ORAL
Qty: 30 TABLET | Refills: 0 | Status: SHIPPED | OUTPATIENT
Start: 2024-05-31

## 2024-05-31 RX ORDER — DIPHENOXYLATE HYDROCHLORIDE AND ATROPINE SULFATE 2.5; .025 MG/1; MG/1
1 TABLET ORAL DAILY
Qty: 100 TABLET | Refills: 1 | Status: SHIPPED | OUTPATIENT
Start: 2024-05-31

## 2024-06-27 ENCOUNTER — TELEPHONE (OUTPATIENT)
Dept: FAMILY MEDICINE CLINIC | Facility: CLINIC | Age: 59
End: 2024-06-27

## 2024-07-01 ENCOUNTER — APPOINTMENT (OUTPATIENT)
Dept: LAB | Facility: CLINIC | Age: 59
End: 2024-07-01
Payer: COMMERCIAL

## 2024-07-01 DIAGNOSIS — E78.2 MIXED HYPERLIPIDEMIA: ICD-10-CM

## 2024-07-01 LAB
ALBUMIN SERPL BCG-MCNC: 3.7 G/DL (ref 3.5–5)
ALP SERPL-CCNC: 96 U/L (ref 34–104)
ALT SERPL W P-5'-P-CCNC: 12 U/L (ref 7–52)
ANION GAP SERPL CALCULATED.3IONS-SCNC: 11 MMOL/L (ref 4–13)
AST SERPL W P-5'-P-CCNC: 18 U/L (ref 13–39)
BASOPHILS # BLD AUTO: 0.03 THOUSANDS/ÂΜL (ref 0–0.1)
BASOPHILS NFR BLD AUTO: 1 % (ref 0–1)
BILIRUB SERPL-MCNC: 0.55 MG/DL (ref 0.2–1)
BUN SERPL-MCNC: 9 MG/DL (ref 5–25)
CALCIUM SERPL-MCNC: 9.6 MG/DL (ref 8.4–10.2)
CHLORIDE SERPL-SCNC: 95 MMOL/L (ref 96–108)
CHOLEST SERPL-MCNC: 159 MG/DL
CO2 SERPL-SCNC: 27 MMOL/L (ref 21–32)
CREAT SERPL-MCNC: 0.67 MG/DL (ref 0.6–1.3)
EOSINOPHIL # BLD AUTO: 0.2 THOUSAND/ÂΜL (ref 0–0.61)
EOSINOPHIL NFR BLD AUTO: 6 % (ref 0–6)
ERYTHROCYTE [DISTWIDTH] IN BLOOD BY AUTOMATED COUNT: 12.6 % (ref 11.6–15.1)
GFR SERPL CREATININE-BSD FRML MDRD: 97 ML/MIN/1.73SQ M
GLUCOSE P FAST SERPL-MCNC: 92 MG/DL (ref 65–99)
HCT VFR BLD AUTO: 40.8 % (ref 34.8–46.1)
HDLC SERPL-MCNC: 55 MG/DL
HGB BLD-MCNC: 13.9 G/DL (ref 11.5–15.4)
IMM GRANULOCYTES # BLD AUTO: 0 THOUSAND/UL (ref 0–0.2)
IMM GRANULOCYTES NFR BLD AUTO: 0 % (ref 0–2)
LDLC SERPL CALC-MCNC: 91 MG/DL (ref 0–100)
LYMPHOCYTES # BLD AUTO: 0.94 THOUSANDS/ÂΜL (ref 0.6–4.47)
LYMPHOCYTES NFR BLD AUTO: 26 % (ref 14–44)
MCH RBC QN AUTO: 30.8 PG (ref 26.8–34.3)
MCHC RBC AUTO-ENTMCNC: 34.1 G/DL (ref 31.4–37.4)
MCV RBC AUTO: 91 FL (ref 82–98)
MONOCYTES # BLD AUTO: 0.48 THOUSAND/ÂΜL (ref 0.17–1.22)
MONOCYTES NFR BLD AUTO: 13 % (ref 4–12)
NEUTROPHILS # BLD AUTO: 1.97 THOUSANDS/ÂΜL (ref 1.85–7.62)
NEUTS SEG NFR BLD AUTO: 54 % (ref 43–75)
NONHDLC SERPL-MCNC: 104 MG/DL
NRBC BLD AUTO-RTO: 0 /100 WBCS
PLATELET # BLD AUTO: 295 THOUSANDS/UL (ref 149–390)
PMV BLD AUTO: 11.1 FL (ref 8.9–12.7)
POTASSIUM SERPL-SCNC: 3.8 MMOL/L (ref 3.5–5.3)
PROT SERPL-MCNC: 6.5 G/DL (ref 6.4–8.4)
RBC # BLD AUTO: 4.51 MILLION/UL (ref 3.81–5.12)
SODIUM SERPL-SCNC: 133 MMOL/L (ref 135–147)
TRIGL SERPL-MCNC: 65 MG/DL
TSH SERPL DL<=0.05 MIU/L-ACNC: 2.1 UIU/ML (ref 0.45–4.5)
WBC # BLD AUTO: 3.62 THOUSAND/UL (ref 4.31–10.16)

## 2024-07-01 PROCEDURE — 80061 LIPID PANEL: CPT

## 2024-07-01 PROCEDURE — 84443 ASSAY THYROID STIM HORMONE: CPT

## 2024-07-01 PROCEDURE — 85025 COMPLETE CBC W/AUTO DIFF WBC: CPT

## 2024-07-01 PROCEDURE — 80053 COMPREHEN METABOLIC PANEL: CPT

## 2024-07-01 PROCEDURE — 36415 COLL VENOUS BLD VENIPUNCTURE: CPT

## 2024-07-02 ENCOUNTER — OFFICE VISIT (OUTPATIENT)
Dept: FAMILY MEDICINE CLINIC | Facility: CLINIC | Age: 59
End: 2024-07-02
Payer: COMMERCIAL

## 2024-07-02 VITALS
BODY MASS INDEX: 50.11 KG/M2 | HEART RATE: 76 BPM | WEIGHT: 282.8 LBS | HEIGHT: 63 IN | RESPIRATION RATE: 18 BRPM | TEMPERATURE: 97.1 F | DIASTOLIC BLOOD PRESSURE: 80 MMHG | OXYGEN SATURATION: 100 % | SYSTOLIC BLOOD PRESSURE: 128 MMHG

## 2024-07-02 DIAGNOSIS — Z12.31 ENCOUNTER FOR SCREENING MAMMOGRAM FOR BREAST CANCER: ICD-10-CM

## 2024-07-02 DIAGNOSIS — Z00.00 ANNUAL PHYSICAL EXAM: ICD-10-CM

## 2024-07-02 DIAGNOSIS — Z12.4 SCREENING FOR CERVICAL CANCER: ICD-10-CM

## 2024-07-02 DIAGNOSIS — F51.04 PSYCHOPHYSIOLOGICAL INSOMNIA: ICD-10-CM

## 2024-07-02 DIAGNOSIS — E66.01 MORBID (SEVERE) OBESITY DUE TO EXCESS CALORIES (HCC): ICD-10-CM

## 2024-07-02 DIAGNOSIS — I10 ESSENTIAL HYPERTENSION: ICD-10-CM

## 2024-07-02 DIAGNOSIS — M43.16 SPONDYLOLISTHESIS AT L4-L5 LEVEL: Primary | ICD-10-CM

## 2024-07-02 DIAGNOSIS — M17.11 PRIMARY OSTEOARTHRITIS OF RIGHT KNEE: ICD-10-CM

## 2024-07-02 PROCEDURE — 99396 PREV VISIT EST AGE 40-64: CPT | Performed by: FAMILY MEDICINE

## 2024-07-02 RX ORDER — ZOLPIDEM TARTRATE 10 MG/1
10 TABLET ORAL
Qty: 30 TABLET | Refills: 0 | Status: SHIPPED | OUTPATIENT
Start: 2024-07-02

## 2024-07-02 RX ORDER — HYDROCHLOROTHIAZIDE 50 MG/1
50 TABLET ORAL DAILY
Qty: 90 TABLET | Refills: 1 | Status: SHIPPED | OUTPATIENT
Start: 2024-07-02

## 2024-07-02 NOTE — ASSESSMENT & PLAN NOTE
Blood pressure stable control doing well 128/80 now doing well with the valsartan continue at 320 mg and hydrochlorothiazide renewed at this time

## 2024-07-02 NOTE — ASSESSMENT & PLAN NOTE
Patient has been evaluated by bariatric surgery and is considering options prior to her knee revision surgery as she will need to lose weight for the surgery for best outcomes

## 2024-07-02 NOTE — PROGRESS NOTES
Adult Annual Physical  Name: Jonna Krishnan      : 1965      MRN: [id number]  Encounter Provider: Jeramy Estevez DO  Encounter Date: 2024   Encounter department: [facility]    Assessment & Plan   1. Spondylolisthesis at L4-L5 level  Assessment & Plan:  Longstanding lower back pain and she will continue with same medication regimen she has limited ability for exercise and ambulation due to her back pain in her knee on the right side which may need replacement in the future pending workup  2. Encounter for screening mammogram for breast cancer  -     Mammo screening bilateral w 3d & cad; Future  3. Screening for cervical cancer  -     Cologuard  4. Essential hypertension  Assessment & Plan:  Blood pressure stable control doing well 128/80 now doing well with the valsartan continue at 320 mg and hydrochlorothiazide renewed at this time  Orders:  -     hydroCHLOROthiazide 50 mg tablet; Take 1 tablet (50 mg total) by mouth daily  5. Psychophysiological insomnia  -     zolpidem (AMBIEN) 10 mg tablet; Take 1 tablet (10 mg total) by mouth daily at bedtime as needed for sleep  6. Primary osteoarthritis of right knee  Assessment & Plan:  Followed by orthopedics through [facility] considering revision of knee surgery  7. Morbid (severe) obesity due to excess calories (HCC)  Assessment & Plan:  Patient has been evaluated by bariatric surgery and is considering options prior to her knee revision surgery as she will need to lose weight for the surgery for best outcomes  8. Annual physical exam    Immunizations and preventive care screenings were discussed with patient today. Appropriate education was printed on patient's after visit summary.    Counseling:  Alcohol/drug use: discussed moderation in alcohol intake, the recommendations for healthy alcohol use, and avoidance of illicit drug use.  Dental Health: discussed importance of regular tooth brushing, flossing, and dental  visits.  Injury prevention: discussed safety/seat belts, safety helmets, smoke detectors, carbon dioxide detectors, and smoking near bedding or upholstery.  Sexual health: discussed sexually transmitted diseases, partner selection, use of condoms, avoidance of unintended pregnancy, and contraceptive alternatives.  Exercise: the importance of regular exercise/physical activity was discussed. Recommend exercise 3-5 times per week for at least 30 minutes.          History of Present Illness     Adult Annual Physical:  Patient presents for annual physical.     Diet and Physical Activity:  - Diet/Nutrition: well balanced diet.  - Exercise: no formal exercise.    Depression Screening:    - PHQ-9 Score: 13    General Health:  - Sleep: sleeps well.  - Hearing: normal hearing bilateral ears.  - Vision: no vision problems.  - Dental: regular dental visits.    Review of Systems   Constitutional:  Negative for chills, fatigue and fever.   HENT:  Negative for congestion, nosebleeds, rhinorrhea, sinus pressure and sore throat.    Eyes:  Negative for discharge and redness.   Respiratory:  Negative for cough and shortness of breath.    Cardiovascular:  Negative for chest pain, palpitations and leg swelling.   Gastrointestinal:  Negative for abdominal pain, blood in stool and nausea.   Endocrine: Negative for cold intolerance, heat intolerance and polyuria.   Genitourinary:  Negative for dysuria and frequency.   Musculoskeletal:  Positive for arthralgias, back pain, gait problem and joint swelling. Negative for myalgias.   Skin:  Negative for rash.   Neurological:  Negative for dizziness, weakness and headaches.   Hematological:  Negative for adenopathy.   Psychiatric/Behavioral:  Negative for behavioral problems and sleep disturbance. The patient is not nervous/anxious.          Objective     /80 (BP Location: Left arm, Patient Position: Sitting, Cuff Size: Standard)   Pulse 76   Temp (!) 97.1 °F (36.2 °C) (Tympanic)    "Resp 18   Ht 5' 3.11\" (1.603 m)   Wt 128 kg (282 lb 12.8 oz)   SpO2 100%   BMI 49.92 kg/m²     Physical Exam  Vitals and nursing note reviewed.   Constitutional:       General: She is not in acute distress.     Appearance: She is well-developed.   HENT:      Head: Normocephalic and atraumatic.      Right Ear: External ear normal.      Left Ear: External ear normal.      Nose: Nose normal.      Mouth/Throat:      Mouth: Mucous membranes are moist.      Pharynx: Oropharynx is clear. No oropharyngeal exudate.   Eyes:      General: No scleral icterus.        Right eye: No discharge.         Left eye: No discharge.      Conjunctiva/sclera: Conjunctivae normal.      Pupils: Pupils are equal, round, and reactive to light.   Neck:      Thyroid: No thyromegaly.      Vascular: No JVD.   Cardiovascular:      Rate and Rhythm: Normal rate and regular rhythm.      Heart sounds: Normal heart sounds. No murmur heard.  Pulmonary:      Effort: Pulmonary effort is normal.      Breath sounds: No wheezing or rales.   Chest:      Chest wall: No tenderness.   Abdominal:      General: Bowel sounds are normal. There is no distension.      Palpations: Abdomen is soft. There is no mass.      Tenderness: There is no abdominal tenderness.   Musculoskeletal:         General: No tenderness or deformity. Normal range of motion.      Cervical back: Normal range of motion.      Comments: Knee pain right and low back pain   Lymphadenopathy:      Cervical: No cervical adenopathy.   Skin:     General: Skin is warm and dry.      Findings: No rash.   Neurological:      General: No focal deficit present.      Mental Status: She is alert and oriented to person, place, and time.      Cranial Nerves: No cranial nerve deficit.      Coordination: Coordination normal.      Deep Tendon Reflexes: Reflexes are normal and symmetric. Reflexes normal.   Psychiatric:         Mood and Affect: Mood normal.         Behavior: Behavior normal.         Thought Content: " Thought content normal.         Judgment: Judgment normal.       Administrative Statements   Depression Screening Follow-up Plan: Patient's depression screening was positive with a PHQ-2 score of . Their PHQ-9 score was 13. Patient advised to follow-up with PCP for further management.

## 2024-07-02 NOTE — ASSESSMENT & PLAN NOTE
Longstanding lower back pain and she will continue with same medication regimen she has limited ability for exercise and ambulation due to her back pain in her knee on the right side which may need replacement in the future pending workup

## 2024-07-16 ENCOUNTER — TELEPHONE (OUTPATIENT)
Dept: FAMILY MEDICINE CLINIC | Facility: CLINIC | Age: 59
End: 2024-07-16

## 2024-07-16 ENCOUNTER — TELEPHONE (OUTPATIENT)
Age: 59
End: 2024-07-16

## 2024-07-16 DIAGNOSIS — Z12.11 SCREENING FOR COLON CANCER: Primary | ICD-10-CM

## 2024-07-16 NOTE — TELEPHONE ENCOUNTER
"Timo from [employer] called in regarding the diagnosis code associated with the cologuard. Current diagnosis code is listed as \"Screening for cervical cancer\"    Please advise.   "

## 2024-08-04 DIAGNOSIS — F51.04 PSYCHOPHYSIOLOGICAL INSOMNIA: ICD-10-CM

## 2024-08-05 RX ORDER — ZOLPIDEM TARTRATE 10 MG/1
10 TABLET ORAL
Qty: 30 TABLET | Refills: 0 | Status: SHIPPED | OUTPATIENT
Start: 2024-08-05

## 2024-09-04 DIAGNOSIS — F51.04 PSYCHOPHYSIOLOGICAL INSOMNIA: ICD-10-CM

## 2024-09-05 RX ORDER — ZOLPIDEM TARTRATE 10 MG/1
10 TABLET ORAL
Qty: 30 TABLET | Refills: 0 | Status: SHIPPED | OUTPATIENT
Start: 2024-09-05

## 2024-09-20 ENCOUNTER — TELEPHONE (OUTPATIENT)
Age: 59
End: 2024-09-20

## 2024-09-20 NOTE — TELEPHONE ENCOUNTER
Pt states she had requested a letter for work but is unable to find it in her MyChart.  This RN is unable to attach it to a [employer] message.  Please review and advise how to proceed.

## 2024-09-24 DIAGNOSIS — M15.9 GENERALIZED OSTEOARTHRITIS: ICD-10-CM

## 2024-09-25 RX ORDER — MELOXICAM 15 MG/1
15 TABLET ORAL DAILY PRN
Qty: 30 TABLET | Refills: 2 | Status: SHIPPED | OUTPATIENT
Start: 2024-09-25

## 2024-10-06 DIAGNOSIS — F51.04 PSYCHOPHYSIOLOGICAL INSOMNIA: ICD-10-CM

## 2024-10-07 RX ORDER — ZOLPIDEM TARTRATE 10 MG/1
10 TABLET ORAL
Qty: 30 TABLET | Refills: 0 | Status: SHIPPED | OUTPATIENT
Start: 2024-10-07

## 2024-11-06 DIAGNOSIS — F33.2 SEVERE EPISODE OF RECURRENT MAJOR DEPRESSIVE DISORDER, WITHOUT PSYCHOTIC FEATURES (HCC): ICD-10-CM

## 2024-11-06 RX ORDER — BUPROPION HYDROCHLORIDE 300 MG/1
300 TABLET ORAL EVERY MORNING
Qty: 90 TABLET | Refills: 1 | Status: SHIPPED | OUTPATIENT
Start: 2024-11-06

## 2024-11-08 DIAGNOSIS — I10 ESSENTIAL HYPERTENSION: ICD-10-CM

## 2024-11-08 DIAGNOSIS — M15.9 GENERALIZED OSTEOARTHRITIS: ICD-10-CM

## 2024-11-08 DIAGNOSIS — F51.04 PSYCHOPHYSIOLOGICAL INSOMNIA: ICD-10-CM

## 2024-11-08 RX ORDER — MELOXICAM 15 MG/1
15 TABLET ORAL DAILY PRN
Qty: 90 TABLET | Refills: 1 | Status: SHIPPED | OUTPATIENT
Start: 2024-11-08

## 2024-11-08 RX ORDER — VALSARTAN 320 MG/1
320 TABLET ORAL DAILY
Qty: 90 TABLET | Refills: 1 | Status: SHIPPED | OUTPATIENT
Start: 2024-11-08

## 2024-11-08 RX ORDER — ZOLPIDEM TARTRATE 10 MG/1
10 TABLET ORAL
Qty: 30 TABLET | Refills: 0 | Status: SHIPPED | OUTPATIENT
Start: 2024-11-08

## 2024-12-15 DIAGNOSIS — F51.04 PSYCHOPHYSIOLOGICAL INSOMNIA: ICD-10-CM

## 2024-12-16 RX ORDER — ZOLPIDEM TARTRATE 10 MG/1
10 TABLET ORAL
Qty: 30 TABLET | Refills: 0 | Status: SHIPPED | OUTPATIENT
Start: 2024-12-16

## 2025-03-22 NOTE — TELEPHONE ENCOUNTER
Pt called looking to make appt with Surgeon  [facility]  She would like a fill and discuss options for weightloss  She is not sure if she wants a revision or just MWM  Transfer paper/info given to SW today for review  Patient understands process of UGI prior to seeing surgeon  49